# Patient Record
Sex: MALE | Race: WHITE | Employment: UNEMPLOYED | URBAN - METROPOLITAN AREA
[De-identification: names, ages, dates, MRNs, and addresses within clinical notes are randomized per-mention and may not be internally consistent; named-entity substitution may affect disease eponyms.]

---

## 2017-03-30 ENCOUNTER — HOSPITAL ENCOUNTER (OUTPATIENT)
Age: 42
Setting detail: SPECIMEN
Discharge: HOME | End: 2017-03-30
Payer: COMMERCIAL

## 2017-03-30 LAB
-: ABNORMAL
AMORPHOUS: ABNORMAL
BACTERIA: ABNORMAL
BILIRUBIN URINE: ABNORMAL
CASTS UA: ABNORMAL /LPF (ref 0–2)
COLOR: ABNORMAL
COMMENT UA: ABNORMAL
CRYSTALS, UA: ABNORMAL /HPF
EPITHELIAL CELLS UA: ABNORMAL /HPF (ref 0–5)
GLUCOSE URINE: ABNORMAL
KETONES, URINE: ABNORMAL
LEUKOCYTE ESTERASE, URINE: ABNORMAL
MUCUS: ABNORMAL
NITRITE, URINE: POSITIVE
OTHER OBSERVATIONS UA: ABNORMAL
PH UA: 6 (ref 5–8)
PROTEIN UA: ABNORMAL
RBC UA: ABNORMAL /HPF (ref 0–2)
RENAL EPITHELIAL, UA: ABNORMAL /HPF
SPECIFIC GRAVITY UA: 1.03 (ref 1–1.03)
TRICHOMONAS: ABNORMAL
TURBIDITY: ABNORMAL
URINE HGB: ABNORMAL
UROBILINOGEN, URINE: NORMAL
WBC UA: ABNORMAL /HPF (ref 0–5)
YEAST: ABNORMAL

## 2017-03-30 PROCEDURE — 87077 CULTURE AEROBIC IDENTIFY: CPT

## 2017-03-30 PROCEDURE — 87086 URINE CULTURE/COLONY COUNT: CPT

## 2017-03-30 PROCEDURE — 81001 URINALYSIS AUTO W/SCOPE: CPT

## 2017-03-30 PROCEDURE — 87186 SC STD MICRODIL/AGAR DIL: CPT

## 2017-04-01 LAB
CULTURE: ABNORMAL
CULTURE: ABNORMAL
Lab: ABNORMAL
Lab: ABNORMAL
ORGANISM: ABNORMAL
SPECIMEN DESCRIPTION: ABNORMAL
SPECIMEN DESCRIPTION: ABNORMAL
STATUS: ABNORMAL

## 2017-04-06 ENCOUNTER — HOSPITAL ENCOUNTER (OUTPATIENT)
Age: 42
Setting detail: SPECIMEN
Discharge: HOME | End: 2017-04-06
Payer: COMMERCIAL

## 2017-04-06 LAB
ANION GAP SERPL CALCULATED.3IONS-SCNC: 16 MMOL/L (ref 9–17)
BUN BLDV-MCNC: 19 MG/DL (ref 6–20)
BUN/CREAT BLD: ABNORMAL (ref 9–20)
CALCIUM SERPL-MCNC: 8.3 MG/DL (ref 8.6–10.4)
CHLORIDE BLD-SCNC: 104 MMOL/L (ref 98–107)
CO2: 18 MMOL/L (ref 20–31)
CREAT SERPL-MCNC: 0.78 MG/DL (ref 0.7–1.2)
GFR AFRICAN AMERICAN: >60 ML/MIN
GFR NON-AFRICAN AMERICAN: >60 ML/MIN
GFR SERPL CREATININE-BSD FRML MDRD: ABNORMAL ML/MIN/{1.73_M2}
GFR SERPL CREATININE-BSD FRML MDRD: ABNORMAL ML/MIN/{1.73_M2}
GLUCOSE BLD-MCNC: 71 MG/DL (ref 70–99)
HCT VFR BLD CALC: 39.9 % (ref 41–53)
HEMOGLOBIN: 13.5 G/DL (ref 13.5–17.5)
MCH RBC QN AUTO: 29.6 PG (ref 26–34)
MCHC RBC AUTO-ENTMCNC: 33.9 G/DL (ref 31–37)
MCV RBC AUTO: 87.3 FL (ref 80–100)
PDW BLD-RTO: 14.8 % (ref 12.5–15.4)
PLATELET # BLD: 227 K/UL (ref 140–450)
PMV BLD AUTO: 8.8 FL (ref 6–12)
POTASSIUM SERPL-SCNC: 4 MMOL/L (ref 3.7–5.3)
RBC # BLD: 4.57 M/UL (ref 4.5–5.9)
SODIUM BLD-SCNC: 138 MMOL/L (ref 135–144)
WBC # BLD: 8 K/UL (ref 3.5–11)

## 2017-04-06 PROCEDURE — P9603 ONE-WAY ALLOW PRORATED MILES: HCPCS

## 2017-04-06 PROCEDURE — 80048 BASIC METABOLIC PNL TOTAL CA: CPT

## 2017-04-06 PROCEDURE — 36415 COLL VENOUS BLD VENIPUNCTURE: CPT

## 2017-04-06 PROCEDURE — 85027 COMPLETE CBC AUTOMATED: CPT

## 2017-10-20 ENCOUNTER — HOSPITAL ENCOUNTER (OUTPATIENT)
Age: 42
Setting detail: SPECIMEN
Discharge: HOME | End: 2017-10-20
Payer: COMMERCIAL

## 2017-10-20 LAB
-: ABNORMAL
AMORPHOUS: ABNORMAL
BACTERIA: ABNORMAL
BILIRUBIN URINE: NEGATIVE
CASTS UA: ABNORMAL /LPF (ref 0–2)
COLOR: YELLOW
COMMENT UA: ABNORMAL
CRYSTALS, UA: ABNORMAL /HPF
EPITHELIAL CELLS UA: ABNORMAL /HPF (ref 0–5)
GLUCOSE URINE: NEGATIVE
KETONES, URINE: NEGATIVE
LEUKOCYTE ESTERASE, URINE: ABNORMAL
MUCUS: ABNORMAL
NITRITE, URINE: POSITIVE
OTHER OBSERVATIONS UA: ABNORMAL
PH UA: 7.5 (ref 5–8)
PROTEIN UA: ABNORMAL
RBC UA: ABNORMAL /HPF (ref 0–2)
RENAL EPITHELIAL, UA: ABNORMAL /HPF
SPECIFIC GRAVITY UA: 1.02 (ref 1–1.03)
TRICHOMONAS: ABNORMAL
TURBIDITY: ABNORMAL
URINE HGB: ABNORMAL
UROBILINOGEN, URINE: NORMAL
WBC UA: ABNORMAL /HPF (ref 0–5)
YEAST: ABNORMAL

## 2017-10-20 PROCEDURE — 87186 SC STD MICRODIL/AGAR DIL: CPT

## 2017-10-20 PROCEDURE — 87088 URINE BACTERIA CULTURE: CPT

## 2017-10-20 PROCEDURE — 87086 URINE CULTURE/COLONY COUNT: CPT

## 2017-10-20 PROCEDURE — 81001 URINALYSIS AUTO W/SCOPE: CPT

## 2017-10-20 PROCEDURE — 87077 CULTURE AEROBIC IDENTIFY: CPT

## 2018-02-16 ENCOUNTER — HOSPITAL ENCOUNTER (OUTPATIENT)
Age: 43
Setting detail: SPECIMEN
Discharge: HOME | End: 2018-02-16
Payer: COMMERCIAL

## 2018-02-16 LAB
ALBUMIN SERPL-MCNC: 4 G/DL (ref 3.5–5.2)
ALBUMIN/GLOBULIN RATIO: 1.3 (ref 1–2.5)
ALP BLD-CCNC: 102 U/L (ref 40–129)
ALT SERPL-CCNC: 53 U/L (ref 5–41)
ANION GAP SERPL CALCULATED.3IONS-SCNC: 18 MMOL/L (ref 9–17)
AST SERPL-CCNC: 51 U/L
BILIRUB SERPL-MCNC: 0.43 MG/DL (ref 0.3–1.2)
BUN BLDV-MCNC: 25 MG/DL (ref 6–20)
BUN/CREAT BLD: ABNORMAL (ref 9–20)
CALCIUM SERPL-MCNC: 8.6 MG/DL (ref 8.6–10.4)
CHLORIDE BLD-SCNC: 101 MMOL/L (ref 98–107)
CO2: 20 MMOL/L (ref 20–31)
CREAT SERPL-MCNC: 0.9 MG/DL (ref 0.7–1.2)
GFR AFRICAN AMERICAN: >60 ML/MIN
GFR NON-AFRICAN AMERICAN: >60 ML/MIN
GFR SERPL CREATININE-BSD FRML MDRD: ABNORMAL ML/MIN/{1.73_M2}
GFR SERPL CREATININE-BSD FRML MDRD: ABNORMAL ML/MIN/{1.73_M2}
GLUCOSE BLD-MCNC: 61 MG/DL (ref 70–99)
HCT VFR BLD CALC: 46.9 % (ref 40.7–50.3)
HEMOGLOBIN: 15 G/DL (ref 13–17)
MCH RBC QN AUTO: 29.8 PG (ref 25.2–33.5)
MCHC RBC AUTO-ENTMCNC: 32 G/DL (ref 28.4–34.8)
MCV RBC AUTO: 93.2 FL (ref 82.6–102.9)
NRBC AUTOMATED: 0 PER 100 WBC
PDW BLD-RTO: 13 % (ref 11.8–14.4)
PLATELET # BLD: 284 K/UL (ref 138–453)
PMV BLD AUTO: 10.6 FL (ref 8.1–13.5)
POTASSIUM SERPL-SCNC: 3.8 MMOL/L (ref 3.7–5.3)
RBC # BLD: 5.03 M/UL (ref 4.21–5.77)
SODIUM BLD-SCNC: 139 MMOL/L (ref 135–144)
TOTAL PROTEIN: 7 G/DL (ref 6.4–8.3)
WBC # BLD: 8.3 K/UL (ref 3.5–11.3)

## 2018-02-16 PROCEDURE — 36415 COLL VENOUS BLD VENIPUNCTURE: CPT

## 2018-02-16 PROCEDURE — P9603 ONE-WAY ALLOW PRORATED MILES: HCPCS

## 2018-02-16 PROCEDURE — 80053 COMPREHEN METABOLIC PANEL: CPT

## 2018-02-16 PROCEDURE — 85027 COMPLETE CBC AUTOMATED: CPT

## 2018-05-15 ENCOUNTER — HOSPITAL ENCOUNTER (OUTPATIENT)
Age: 43
Setting detail: SPECIMEN
Discharge: HOME | End: 2018-05-15
Payer: COMMERCIAL

## 2018-05-15 LAB — VITAMIN D 25-HYDROXY: 63.9 NG/ML (ref 30–100)

## 2018-05-15 PROCEDURE — P9603 ONE-WAY ALLOW PRORATED MILES: HCPCS

## 2018-05-15 PROCEDURE — 82306 VITAMIN D 25 HYDROXY: CPT

## 2018-05-15 PROCEDURE — 36415 COLL VENOUS BLD VENIPUNCTURE: CPT

## 2018-11-14 ENCOUNTER — HOSPITAL ENCOUNTER (OUTPATIENT)
Age: 43
Setting detail: SPECIMEN
Discharge: HOME | End: 2018-11-14
Payer: COMMERCIAL

## 2018-11-14 LAB — VITAMIN D 25-HYDROXY: 75.3 NG/ML (ref 30–100)

## 2018-11-14 PROCEDURE — 82306 VITAMIN D 25 HYDROXY: CPT

## 2018-11-14 PROCEDURE — 36415 COLL VENOUS BLD VENIPUNCTURE: CPT

## 2018-11-14 PROCEDURE — P9603 ONE-WAY ALLOW PRORATED MILES: HCPCS

## 2018-12-06 ENCOUNTER — HOSPITAL ENCOUNTER (OUTPATIENT)
Age: 43
Setting detail: SPECIMEN
Discharge: HOME | End: 2018-12-06
Payer: COMMERCIAL

## 2018-12-06 LAB
ANION GAP SERPL CALCULATED.3IONS-SCNC: 16 MMOL/L (ref 9–17)
BUN BLDV-MCNC: 16 MG/DL (ref 6–20)
BUN/CREAT BLD: 25 (ref 9–20)
CALCIUM SERPL-MCNC: 9.2 MG/DL (ref 8.6–10.4)
CHLORIDE BLD-SCNC: 100 MMOL/L (ref 98–107)
CO2: 22 MMOL/L (ref 20–31)
CREAT SERPL-MCNC: 0.63 MG/DL (ref 0.7–1.2)
GFR AFRICAN AMERICAN: >60 ML/MIN
GFR NON-AFRICAN AMERICAN: >60 ML/MIN
GFR SERPL CREATININE-BSD FRML MDRD: ABNORMAL ML/MIN/{1.73_M2}
GFR SERPL CREATININE-BSD FRML MDRD: ABNORMAL ML/MIN/{1.73_M2}
GLUCOSE BLD-MCNC: 95 MG/DL (ref 70–99)
POTASSIUM SERPL-SCNC: 3.8 MMOL/L (ref 3.7–5.3)
SODIUM BLD-SCNC: 138 MMOL/L (ref 135–144)

## 2018-12-06 PROCEDURE — P9603 ONE-WAY ALLOW PRORATED MILES: HCPCS

## 2018-12-06 PROCEDURE — 36415 COLL VENOUS BLD VENIPUNCTURE: CPT

## 2018-12-06 PROCEDURE — 80048 BASIC METABOLIC PNL TOTAL CA: CPT

## 2018-12-10 ENCOUNTER — HOSPITAL ENCOUNTER (OUTPATIENT)
Age: 43
Setting detail: SPECIMEN
Discharge: HOME | End: 2018-12-10
Payer: COMMERCIAL

## 2018-12-10 LAB
VANCOMYCIN TROUGH DATE LAST DOSE: ABNORMAL
VANCOMYCIN TROUGH DOSE AMOUNT: ABNORMAL
VANCOMYCIN TROUGH TIME LAST DOSE: ABNORMAL
VANCOMYCIN TROUGH: 23.2 UG/ML (ref 10–20)

## 2018-12-10 PROCEDURE — 80202 ASSAY OF VANCOMYCIN: CPT

## 2018-12-13 ENCOUNTER — HOSPITAL ENCOUNTER (OUTPATIENT)
Age: 43
Setting detail: SPECIMEN
Discharge: HOME | End: 2018-12-13
Payer: COMMERCIAL

## 2018-12-13 LAB
VANCOMYCIN RANDOM DATE LAST DOSE: NORMAL
VANCOMYCIN RANDOM DOSE AMOUNT: NORMAL
VANCOMYCIN RANDOM TIME LAST DOSE: NORMAL
VANCOMYCIN RANDOM: <4 UG/ML

## 2018-12-13 PROCEDURE — 80202 ASSAY OF VANCOMYCIN: CPT

## 2019-01-18 ENCOUNTER — HOSPITAL ENCOUNTER (OUTPATIENT)
Age: 44
Setting detail: SPECIMEN
Discharge: HOME | End: 2019-01-18
Payer: COMMERCIAL

## 2019-03-04 ENCOUNTER — HOSPITAL ENCOUNTER (OUTPATIENT)
Age: 44
Setting detail: SPECIMEN
Discharge: HOME | End: 2019-03-04
Payer: COMMERCIAL

## 2019-03-04 PROCEDURE — 87086 URINE CULTURE/COLONY COUNT: CPT

## 2019-03-04 PROCEDURE — 87088 URINE BACTERIA CULTURE: CPT

## 2019-03-04 PROCEDURE — 87186 SC STD MICRODIL/AGAR DIL: CPT

## 2019-03-04 PROCEDURE — 81001 URINALYSIS AUTO W/SCOPE: CPT

## 2019-03-05 LAB
-: ABNORMAL
AMORPHOUS: ABNORMAL
BACTERIA: ABNORMAL
BILIRUBIN URINE: NEGATIVE
CASTS UA: ABNORMAL /LPF (ref 0–8)
COLOR: YELLOW
COMMENT UA: ABNORMAL
CRYSTALS, UA: ABNORMAL /HPF
EPITHELIAL CELLS UA: ABNORMAL /HPF (ref 0–5)
GLUCOSE URINE: NEGATIVE
KETONES, URINE: NEGATIVE
LEUKOCYTE ESTERASE, URINE: ABNORMAL
MUCUS: ABNORMAL
NITRITE, URINE: POSITIVE
OTHER OBSERVATIONS UA: ABNORMAL
PH UA: 6.5 (ref 5–8)
PROTEIN UA: ABNORMAL
RBC UA: ABNORMAL /HPF (ref 0–4)
RENAL EPITHELIAL, UA: ABNORMAL /HPF
SPECIFIC GRAVITY UA: 1.02 (ref 1–1.03)
TRICHOMONAS: ABNORMAL
TURBIDITY: ABNORMAL
URINE HGB: ABNORMAL
UROBILINOGEN, URINE: NORMAL
WBC UA: ABNORMAL /HPF (ref 0–5)
YEAST: ABNORMAL

## 2019-03-06 LAB
CULTURE: ABNORMAL
Lab: ABNORMAL
SPECIMEN DESCRIPTION: ABNORMAL

## 2019-03-22 ENCOUNTER — HOSPITAL ENCOUNTER (OUTPATIENT)
Age: 44
Setting detail: SPECIMEN
Discharge: HOME | End: 2019-03-22
Payer: COMMERCIAL

## 2019-03-22 LAB
-: NORMAL
AMORPHOUS: NORMAL
BACTERIA: NORMAL
BILIRUBIN URINE: NEGATIVE
CASTS UA: NORMAL /LPF (ref 0–2)
CASTS UA: NORMAL /LPF (ref 0–2)
COLOR: YELLOW
COMMENT UA: ABNORMAL
CRYSTALS, UA: NORMAL /HPF
EPITHELIAL CELLS UA: NORMAL /HPF (ref 0–5)
GLUCOSE URINE: NEGATIVE
KETONES, URINE: ABNORMAL
LEUKOCYTE ESTERASE, URINE: ABNORMAL
MUCUS: NORMAL
NITRITE, URINE: NEGATIVE
OTHER OBSERVATIONS UA: NORMAL
PH UA: 5.5 (ref 5–8)
PROTEIN UA: ABNORMAL
RBC UA: NORMAL /HPF (ref 0–2)
RENAL EPITHELIAL, UA: NORMAL /HPF
SPECIFIC GRAVITY UA: 1.02 (ref 1–1.03)
TRICHOMONAS: NORMAL
TURBIDITY: CLEAR
URINE HGB: ABNORMAL
UROBILINOGEN, URINE: NORMAL
WBC UA: NORMAL /HPF (ref 0–5)
YEAST: NORMAL

## 2019-03-22 PROCEDURE — 87086 URINE CULTURE/COLONY COUNT: CPT

## 2019-03-22 PROCEDURE — 81001 URINALYSIS AUTO W/SCOPE: CPT

## 2019-03-23 LAB
CULTURE: NO GROWTH
Lab: NORMAL
SPECIMEN DESCRIPTION: NORMAL

## 2019-04-12 ENCOUNTER — HOSPITAL ENCOUNTER (OUTPATIENT)
Age: 44
Setting detail: SPECIMEN
Discharge: HOME | End: 2019-04-12
Payer: COMMERCIAL

## 2019-04-12 LAB — VITAMIN D 25-HYDROXY: 72.9 NG/ML (ref 30–100)

## 2019-04-12 PROCEDURE — 82306 VITAMIN D 25 HYDROXY: CPT

## 2019-04-12 PROCEDURE — P9603 ONE-WAY ALLOW PRORATED MILES: HCPCS

## 2019-04-12 PROCEDURE — 36415 COLL VENOUS BLD VENIPUNCTURE: CPT

## 2019-04-19 ENCOUNTER — HOSPITAL ENCOUNTER (OUTPATIENT)
Age: 44
Setting detail: SPECIMEN
Discharge: HOME | End: 2019-04-19
Payer: COMMERCIAL

## 2019-04-19 LAB
-: ABNORMAL
AMORPHOUS: ABNORMAL
BACTERIA: ABNORMAL
BILIRUBIN URINE: NEGATIVE
CASTS UA: ABNORMAL /LPF
COLOR: ABNORMAL
COMMENT UA: ABNORMAL
CRYSTALS, UA: ABNORMAL /HPF
EPITHELIAL CELLS UA: ABNORMAL /HPF (ref 0–5)
GLUCOSE URINE: ABNORMAL
KETONES, URINE: NEGATIVE
LEUKOCYTE ESTERASE, URINE: ABNORMAL
MUCUS: ABNORMAL
NITRITE, URINE: POSITIVE
OTHER OBSERVATIONS UA: ABNORMAL
PH UA: 5.5 (ref 5–8)
PROTEIN UA: ABNORMAL
RBC UA: ABNORMAL /HPF (ref 0–2)
RENAL EPITHELIAL, UA: ABNORMAL /HPF
SPECIFIC GRAVITY UA: 1.02 (ref 1–1.03)
TRICHOMONAS: ABNORMAL
TURBIDITY: ABNORMAL
URINE HGB: ABNORMAL
UROBILINOGEN, URINE: ABNORMAL
WBC UA: ABNORMAL /HPF (ref 0–5)
YEAST: ABNORMAL

## 2019-04-19 PROCEDURE — 87086 URINE CULTURE/COLONY COUNT: CPT

## 2019-04-19 PROCEDURE — 81001 URINALYSIS AUTO W/SCOPE: CPT

## 2019-04-20 LAB
CULTURE: NORMAL
Lab: NORMAL
SPECIMEN DESCRIPTION: NORMAL

## 2019-08-08 ENCOUNTER — APPOINTMENT (OUTPATIENT)
Dept: GENERAL RADIOLOGY | Age: 44
DRG: 854 | End: 2019-08-08
Payer: MEDICARE

## 2019-08-08 ENCOUNTER — APPOINTMENT (OUTPATIENT)
Dept: CT IMAGING | Age: 44
DRG: 854 | End: 2019-08-08
Payer: MEDICARE

## 2019-08-08 ENCOUNTER — HOSPITAL ENCOUNTER (INPATIENT)
Age: 44
LOS: 13 days | Discharge: NURSING, REHAB OR LONG-TERM CARE | DRG: 854 | End: 2019-08-21
Attending: EMERGENCY MEDICINE | Admitting: INTERNAL MEDICINE
Payer: MEDICARE

## 2019-08-08 DIAGNOSIS — N39.0 URINARY TRACT INFECTION WITHOUT HEMATURIA, SITE UNSPECIFIED: ICD-10-CM

## 2019-08-08 DIAGNOSIS — A41.9 SEPTICEMIA (HCC): Primary | ICD-10-CM

## 2019-08-08 DIAGNOSIS — Z93.3 S/P COLOSTOMY (HCC): ICD-10-CM

## 2019-08-08 DIAGNOSIS — K56.41 FECAL IMPACTION (HCC): ICD-10-CM

## 2019-08-08 PROBLEM — Z87.19 H/O SMALL BOWEL OBSTRUCTION: Status: ACTIVE | Noted: 2019-08-08

## 2019-08-08 PROBLEM — K92.2 GI BLEED: Status: ACTIVE | Noted: 2019-08-08

## 2019-08-08 PROBLEM — Z72.0 TOBACCO ABUSE: Status: ACTIVE | Noted: 2019-08-08

## 2019-08-08 PROBLEM — K92.2 GI BLEED: Status: RESOLVED | Noted: 2019-08-08 | Resolved: 2019-08-08

## 2019-08-08 PROBLEM — R73.9 HYPERGLYCEMIA: Status: ACTIVE | Noted: 2019-08-08

## 2019-08-08 LAB
-: NORMAL
-: NORMAL
ABSOLUTE EOS #: 0 K/UL (ref 0–0.4)
ABSOLUTE IMMATURE GRANULOCYTE: 0.22 K/UL (ref 0–0.3)
ABSOLUTE LYMPH #: 0.67 K/UL (ref 1–4.8)
ABSOLUTE MONO #: 2.01 K/UL (ref 0.2–0.8)
ALBUMIN SERPL-MCNC: 4.6 G/DL (ref 3.5–5.2)
ALBUMIN/GLOBULIN RATIO: ABNORMAL (ref 1–2.5)
ALP BLD-CCNC: 130 U/L (ref 40–129)
ALT SERPL-CCNC: 47 U/L (ref 5–41)
AMMONIA: 76 UMOL/L (ref 16–60)
AMORPHOUS: NORMAL
AMPHETAMINE SCREEN URINE: NEGATIVE
AMYLASE: 52 U/L (ref 28–100)
ANION GAP SERPL CALCULATED.3IONS-SCNC: 23 MMOL/L (ref 9–17)
AST SERPL-CCNC: 59 U/L
BACTERIA: NORMAL
BARBITURATE SCREEN URINE: NEGATIVE
BASOPHILS # BLD: 0 %
BASOPHILS ABSOLUTE: 0 K/UL (ref 0–0.2)
BENZODIAZEPINE SCREEN, URINE: NEGATIVE
BILIRUB SERPL-MCNC: 0.94 MG/DL (ref 0.3–1.2)
BILIRUBIN URINE: ABNORMAL
BUN BLDV-MCNC: 21 MG/DL (ref 6–20)
BUN/CREAT BLD: 19 (ref 9–20)
BUPRENORPHINE URINE: ABNORMAL
CALCIUM SERPL-MCNC: 9.9 MG/DL (ref 8.6–10.4)
CANNABINOID SCREEN URINE: NEGATIVE
CASTS UA: NORMAL /LPF
CHLORIDE BLD-SCNC: 101 MMOL/L (ref 98–107)
CO2: 13 MMOL/L (ref 20–31)
COCAINE METABOLITE, URINE: NEGATIVE
COLOR: ABNORMAL
COMMENT UA: ABNORMAL
CREAT SERPL-MCNC: 1.09 MG/DL (ref 0.7–1.2)
CRYSTALS, UA: NORMAL /HPF
DATE, STOOL #1: NORMAL
DATE, STOOL #2: NORMAL
DATE, STOOL #3: NORMAL
DIFFERENTIAL TYPE: ABNORMAL
EOSINOPHILS RELATIVE PERCENT: 0 % (ref 1–4)
EPITHELIAL CELLS UA: NORMAL /HPF (ref 0–5)
FIO2: 21
GFR AFRICAN AMERICAN: >60 ML/MIN
GFR NON-AFRICAN AMERICAN: >60 ML/MIN
GFR SERPL CREATININE-BSD FRML MDRD: ABNORMAL ML/MIN/{1.73_M2}
GFR SERPL CREATININE-BSD FRML MDRD: ABNORMAL ML/MIN/{1.73_M2}
GLUCOSE BLD-MCNC: 195 MG/DL (ref 70–99)
GLUCOSE URINE: ABNORMAL
HCT VFR BLD CALC: 57.4 % (ref 40.7–50.3)
HEMOCCULT SP1 STL QL: NEGATIVE
HEMOCCULT SP2 STL QL: NORMAL
HEMOCCULT SP3 STL QL: NORMAL
HEMOGLOBIN: 18.9 G/DL (ref 13–17)
IMMATURE GRANULOCYTES: 1 %
INR BLD: 1
KETONES, URINE: ABNORMAL
LACTIC ACID, SEPSIS WHOLE BLOOD: ABNORMAL MMOL/L (ref 0.5–1.9)
LACTIC ACID, SEPSIS WHOLE BLOOD: ABNORMAL MMOL/L (ref 0.5–1.9)
LACTIC ACID, SEPSIS: 2.9 MMOL/L (ref 0.5–1.9)
LACTIC ACID, SEPSIS: 3.9 MMOL/L (ref 0.5–1.9)
LEUKOCYTE ESTERASE, URINE: ABNORMAL
LIPASE: 31 U/L (ref 13–60)
LYMPHOCYTES # BLD: 3 % (ref 24–44)
MCH RBC QN AUTO: 30.1 PG (ref 25.2–33.5)
MCHC RBC AUTO-ENTMCNC: 32.9 G/DL (ref 28.4–34.8)
MCV RBC AUTO: 91.4 FL (ref 82.6–102.9)
MDMA URINE: ABNORMAL
METHADONE SCREEN, URINE: NEGATIVE
METHAMPHETAMINE, URINE: ABNORMAL
MONOCYTES # BLD: 9 % (ref 1–7)
MUCUS: NORMAL
NEGATIVE BASE EXCESS, ART: 7 (ref 0–2)
NITRITE, URINE: POSITIVE
NRBC AUTOMATED: 0 PER 100 WBC
O2 DEVICE/FLOW/%: ABNORMAL
OPIATES, URINE: POSITIVE
OTHER OBSERVATIONS UA: NORMAL
OXYCODONE SCREEN URINE: NEGATIVE
PARTIAL THROMBOPLASTIN TIME: 28 SEC (ref 23–31)
PATIENT TEMP: 37
PDW BLD-RTO: 13.5 % (ref 11.8–14.4)
PH UA: 6.5 (ref 5–8)
PHENCYCLIDINE, URINE: NEGATIVE
PLATELET # BLD: 365 K/UL (ref 138–453)
PLATELET ESTIMATE: ABNORMAL
PMV BLD AUTO: 10.6 FL (ref 8.1–13.5)
POC HCO3: 18.8 MMOL/L (ref 22–27)
POC O2 SATURATION: 94 %
POC PCO2 TEMP: ABNORMAL MM HG
POC PCO2: 34 MM HG (ref 32–45)
POC PH TEMP: ABNORMAL
POC PH: 7.36 (ref 7.35–7.45)
POC PO2 TEMP: ABNORMAL MM HG
POC PO2: 74 MM HG (ref 75–95)
POSITIVE BASE EXCESS, ART: ABNORMAL (ref 0–2)
POTASSIUM SERPL-SCNC: 4.9 MMOL/L (ref 3.7–5.3)
PROPOXYPHENE, URINE: ABNORMAL
PROTEIN UA: ABNORMAL
PROTHROMBIN TIME: 10.7 SEC (ref 9.7–11.6)
RBC # BLD: 6.28 M/UL (ref 4.21–5.77)
RBC # BLD: ABNORMAL 10*6/UL
RBC UA: NORMAL /HPF (ref 0–2)
REASON FOR REJECTION: NORMAL
RENAL EPITHELIAL, UA: NORMAL /HPF
SEG NEUTROPHILS: 87 % (ref 36–66)
SEGMENTED NEUTROPHILS ABSOLUTE COUNT: 19.4 K/UL (ref 1.8–7.7)
SODIUM BLD-SCNC: 137 MMOL/L (ref 135–144)
SPECIFIC GRAVITY UA: 1.01 (ref 1–1.03)
TCO2 (CALC), ART: 20 MMOL/L (ref 23–28)
TEST INFORMATION: ABNORMAL
THYROXINE, FREE: 1.72 NG/DL (ref 0.93–1.7)
TIME, STOOL #1: NORMAL
TIME, STOOL #2: NORMAL
TIME, STOOL #3: NORMAL
TOTAL PROTEIN: 7.8 G/DL (ref 6.4–8.3)
TRICHOMONAS: NORMAL
TRICYCLIC ANTIDEPRESSANTS, UR: ABNORMAL
TROPONIN INTERP: NORMAL
TROPONIN T: NORMAL NG/ML
TROPONIN, HIGH SENSITIVITY: <6 NG/L (ref 0–22)
TSH SERPL DL<=0.05 MIU/L-ACNC: 0.65 MIU/L (ref 0.3–5)
TURBIDITY: ABNORMAL
URINE HGB: ABNORMAL
UROBILINOGEN, URINE: ABNORMAL
WBC # BLD: 22.3 K/UL (ref 3.5–11.3)
WBC # BLD: ABNORMAL 10*3/UL
WBC UA: NORMAL /HPF (ref 0–5)
YEAST: NORMAL
ZZ NTE CLEAN UP: ORDERED TEST: NORMAL
ZZ NTE WITH NAME CLEAN UP: SPECIMEN SOURCE: NORMAL

## 2019-08-08 PROCEDURE — 2000000000 HC ICU R&B

## 2019-08-08 PROCEDURE — 70450 CT HEAD/BRAIN W/O DYE: CPT

## 2019-08-08 PROCEDURE — 87040 BLOOD CULTURE FOR BACTERIA: CPT

## 2019-08-08 PROCEDURE — 2500000003 HC RX 250 WO HCPCS: Performed by: NURSE PRACTITIONER

## 2019-08-08 PROCEDURE — 51702 INSERT TEMP BLADDER CATH: CPT

## 2019-08-08 PROCEDURE — 88307 TISSUE EXAM BY PATHOLOGIST: CPT

## 2019-08-08 PROCEDURE — 83690 ASSAY OF LIPASE: CPT

## 2019-08-08 PROCEDURE — 96361 HYDRATE IV INFUSION ADD-ON: CPT

## 2019-08-08 PROCEDURE — 6360000004 HC RX CONTRAST MEDICATION: Performed by: EMERGENCY MEDICINE

## 2019-08-08 PROCEDURE — 02HV33Z INSERTION OF INFUSION DEVICE INTO SUPERIOR VENA CAVA, PERCUTANEOUS APPROACH: ICD-10-PCS | Performed by: INTERNAL MEDICINE

## 2019-08-08 PROCEDURE — 2580000003 HC RX 258: Performed by: NURSE PRACTITIONER

## 2019-08-08 PROCEDURE — 88302 TISSUE EXAM BY PATHOLOGIST: CPT

## 2019-08-08 PROCEDURE — 74177 CT ABD & PELVIS W/CONTRAST: CPT

## 2019-08-08 PROCEDURE — 84484 ASSAY OF TROPONIN QUANT: CPT

## 2019-08-08 PROCEDURE — 85610 PROTHROMBIN TIME: CPT

## 2019-08-08 PROCEDURE — 2580000003 HC RX 258: Performed by: INTERNAL MEDICINE

## 2019-08-08 PROCEDURE — 84439 ASSAY OF FREE THYROXINE: CPT

## 2019-08-08 PROCEDURE — 36600 WITHDRAWAL OF ARTERIAL BLOOD: CPT

## 2019-08-08 PROCEDURE — 80307 DRUG TEST PRSMV CHEM ANLYZR: CPT

## 2019-08-08 PROCEDURE — 96375 TX/PRO/DX INJ NEW DRUG ADDON: CPT

## 2019-08-08 PROCEDURE — 87324 CLOSTRIDIUM AG IA: CPT

## 2019-08-08 PROCEDURE — 0T9B70Z DRAINAGE OF BLADDER WITH DRAINAGE DEVICE, VIA NATURAL OR ARTIFICIAL OPENING: ICD-10-PCS | Performed by: EMERGENCY MEDICINE

## 2019-08-08 PROCEDURE — 83605 ASSAY OF LACTIC ACID: CPT

## 2019-08-08 PROCEDURE — 2580000003 HC RX 258: Performed by: EMERGENCY MEDICINE

## 2019-08-08 PROCEDURE — 6360000002 HC RX W HCPCS: Performed by: EMERGENCY MEDICINE

## 2019-08-08 PROCEDURE — 99285 EMERGENCY DEPT VISIT HI MDM: CPT

## 2019-08-08 PROCEDURE — 87506 IADNA-DNA/RNA PROBE TQ 6-11: CPT

## 2019-08-08 PROCEDURE — 84443 ASSAY THYROID STIM HORMONE: CPT

## 2019-08-08 PROCEDURE — 85730 THROMBOPLASTIN TIME PARTIAL: CPT

## 2019-08-08 PROCEDURE — 82140 ASSAY OF AMMONIA: CPT

## 2019-08-08 PROCEDURE — 96365 THER/PROPH/DIAG IV INF INIT: CPT

## 2019-08-08 PROCEDURE — 74018 RADEX ABDOMEN 1 VIEW: CPT

## 2019-08-08 PROCEDURE — 87449 NOS EACH ORGANISM AG IA: CPT

## 2019-08-08 PROCEDURE — 93005 ELECTROCARDIOGRAM TRACING: CPT | Performed by: EMERGENCY MEDICINE

## 2019-08-08 PROCEDURE — 71250 CT THORAX DX C-: CPT

## 2019-08-08 PROCEDURE — 87086 URINE CULTURE/COLONY COUNT: CPT

## 2019-08-08 PROCEDURE — 81001 URINALYSIS AUTO W/SCOPE: CPT

## 2019-08-08 PROCEDURE — 82803 BLOOD GASES ANY COMBINATION: CPT

## 2019-08-08 PROCEDURE — 82150 ASSAY OF AMYLASE: CPT

## 2019-08-08 PROCEDURE — 85025 COMPLETE CBC W/AUTO DIFF WBC: CPT

## 2019-08-08 PROCEDURE — 80053 COMPREHEN METABOLIC PANEL: CPT

## 2019-08-08 PROCEDURE — G0328 FECAL BLOOD SCRN IMMUNOASSAY: HCPCS

## 2019-08-08 PROCEDURE — 99223 1ST HOSP IP/OBS HIGH 75: CPT | Performed by: NURSE PRACTITIONER

## 2019-08-08 PROCEDURE — 71045 X-RAY EXAM CHEST 1 VIEW: CPT

## 2019-08-08 RX ORDER — ERGOCALCIFEROL 1.25 MG/1
50000 CAPSULE ORAL WEEKLY
Status: DISCONTINUED | OUTPATIENT
Start: 2019-08-08 | End: 2019-08-09

## 2019-08-08 RX ORDER — SODIUM CHLORIDE 0.9 % (FLUSH) 0.9 %
10 SYRINGE (ML) INJECTION PRN
Status: DISCONTINUED | OUTPATIENT
Start: 2019-08-08 | End: 2019-08-08 | Stop reason: SDUPTHER

## 2019-08-08 RX ORDER — HYDROCODONE BITARTRATE AND ACETAMINOPHEN 5; 325 MG/1; MG/1
1 TABLET ORAL EVERY 6 HOURS PRN
Status: ON HOLD | COMMUNITY
End: 2019-08-21 | Stop reason: SDUPTHER

## 2019-08-08 RX ORDER — NICOTINE POLACRILEX 4 MG
15 LOZENGE BUCCAL PRN
Status: DISCONTINUED | OUTPATIENT
Start: 2019-08-08 | End: 2019-08-21 | Stop reason: HOSPADM

## 2019-08-08 RX ORDER — HYDROCODONE BITARTRATE AND ACETAMINOPHEN 5; 325 MG/1; MG/1
1 TABLET ORAL EVERY 6 HOURS PRN
Status: DISCONTINUED | OUTPATIENT
Start: 2019-08-08 | End: 2019-08-08

## 2019-08-08 RX ORDER — ONDANSETRON 2 MG/ML
4 INJECTION INTRAMUSCULAR; INTRAVENOUS EVERY 6 HOURS PRN
Status: DISCONTINUED | OUTPATIENT
Start: 2019-08-08 | End: 2019-08-09

## 2019-08-08 RX ORDER — 0.9 % SODIUM CHLORIDE 0.9 %
30 INTRAVENOUS SOLUTION INTRAVENOUS ONCE
Status: COMPLETED | OUTPATIENT
Start: 2019-08-08 | End: 2019-08-09

## 2019-08-08 RX ORDER — FENTANYL 25 UG/H
1 PATCH TRANSDERMAL
Status: DISCONTINUED | OUTPATIENT
Start: 2019-08-08 | End: 2019-08-09

## 2019-08-08 RX ORDER — BENZTROPINE MESYLATE 1 MG/ML
2 INJECTION INTRAMUSCULAR; INTRAVENOUS DAILY PRN
Status: DISCONTINUED | OUTPATIENT
Start: 2019-08-08 | End: 2019-08-21 | Stop reason: HOSPADM

## 2019-08-08 RX ORDER — LUBIPROSTONE 24 UG/1
24 CAPSULE, GELATIN COATED ORAL 2 TIMES DAILY PRN
Status: ON HOLD | COMMUNITY
End: 2019-08-21 | Stop reason: HOSPADM

## 2019-08-08 RX ORDER — DIPHENHYDRAMINE HCL 25 MG
25 CAPSULE ORAL EVERY 6 HOURS PRN
Status: DISCONTINUED | OUTPATIENT
Start: 2019-08-08 | End: 2019-08-08

## 2019-08-08 RX ORDER — OXYBUTYNIN CHLORIDE 10 MG/1
10 TABLET, EXTENDED RELEASE ORAL DAILY
Status: DISCONTINUED | OUTPATIENT
Start: 2019-08-08 | End: 2019-08-09

## 2019-08-08 RX ORDER — NICOTINE 21 MG/24HR
1 PATCH, TRANSDERMAL 24 HOURS TRANSDERMAL DAILY PRN
Status: DISCONTINUED | OUTPATIENT
Start: 2019-08-08 | End: 2019-08-21 | Stop reason: HOSPADM

## 2019-08-08 RX ORDER — LUBIPROSTONE 24 UG/1
24 CAPSULE, GELATIN COATED ORAL 2 TIMES DAILY PRN
Status: DISCONTINUED | OUTPATIENT
Start: 2019-08-08 | End: 2019-08-08

## 2019-08-08 RX ORDER — TRAZODONE HYDROCHLORIDE 50 MG/1
25 TABLET ORAL NIGHTLY PRN
COMMUNITY

## 2019-08-08 RX ORDER — ONDANSETRON 2 MG/ML
4 INJECTION INTRAMUSCULAR; INTRAVENOUS ONCE
Status: COMPLETED | OUTPATIENT
Start: 2019-08-08 | End: 2019-08-08

## 2019-08-08 RX ORDER — 0.9 % SODIUM CHLORIDE 0.9 %
250 INTRAVENOUS SOLUTION INTRAVENOUS ONCE
Status: COMPLETED | OUTPATIENT
Start: 2019-08-08 | End: 2019-08-08

## 2019-08-08 RX ORDER — ACETAMINOPHEN 325 MG/1
650 TABLET ORAL EVERY 4 HOURS PRN
Status: DISCONTINUED | OUTPATIENT
Start: 2019-08-08 | End: 2019-08-21 | Stop reason: HOSPADM

## 2019-08-08 RX ORDER — VILAZODONE HYDROCHLORIDE 20 MG/1
20 TABLET ORAL DAILY
COMMUNITY

## 2019-08-08 RX ORDER — 0.9 % SODIUM CHLORIDE 0.9 %
10 VIAL (ML) INJECTION 2 TIMES DAILY
Status: DISCONTINUED | OUTPATIENT
Start: 2019-08-08 | End: 2019-08-09

## 2019-08-08 RX ORDER — PANTOPRAZOLE SODIUM 40 MG/1
40 TABLET, DELAYED RELEASE ORAL
Status: DISCONTINUED | OUTPATIENT
Start: 2019-08-09 | End: 2019-08-08

## 2019-08-08 RX ORDER — SODIUM CHLORIDE 0.9 % (FLUSH) 0.9 %
10 SYRINGE (ML) INJECTION EVERY 12 HOURS SCHEDULED
Status: DISCONTINUED | OUTPATIENT
Start: 2019-08-08 | End: 2019-08-09 | Stop reason: SDUPTHER

## 2019-08-08 RX ORDER — SODIUM CHLORIDE 9 MG/ML
INJECTION, SOLUTION INTRAVENOUS CONTINUOUS
Status: DISCONTINUED | OUTPATIENT
Start: 2019-08-08 | End: 2019-08-08

## 2019-08-08 RX ORDER — DEXTROSE MONOHYDRATE 50 MG/ML
100 INJECTION, SOLUTION INTRAVENOUS PRN
Status: DISCONTINUED | OUTPATIENT
Start: 2019-08-08 | End: 2019-08-21 | Stop reason: HOSPADM

## 2019-08-08 RX ORDER — PROMETHAZINE HYDROCHLORIDE 25 MG/1
25 TABLET ORAL EVERY 6 HOURS PRN
Status: DISCONTINUED | OUTPATIENT
Start: 2019-08-08 | End: 2019-08-08

## 2019-08-08 RX ORDER — FAMOTIDINE 20 MG/1
20 TABLET, FILM COATED ORAL 2 TIMES DAILY
Status: DISCONTINUED | OUTPATIENT
Start: 2019-08-08 | End: 2019-08-09

## 2019-08-08 RX ORDER — 0.9 % SODIUM CHLORIDE 0.9 %
2000 INTRAVENOUS SOLUTION INTRAVENOUS ONCE
Status: COMPLETED | OUTPATIENT
Start: 2019-08-08 | End: 2019-08-08

## 2019-08-08 RX ORDER — LAMOTRIGINE 25 MG/1
25 TABLET ORAL 2 TIMES DAILY
COMMUNITY

## 2019-08-08 RX ORDER — GABAPENTIN 100 MG/1
100 CAPSULE ORAL 2 TIMES DAILY
COMMUNITY

## 2019-08-08 RX ORDER — SODIUM CHLORIDE 0.9 % (FLUSH) 0.9 %
10 SYRINGE (ML) INJECTION EVERY 12 HOURS SCHEDULED
Status: DISCONTINUED | OUTPATIENT
Start: 2019-08-08 | End: 2019-08-08 | Stop reason: SDUPTHER

## 2019-08-08 RX ORDER — SODIUM CHLORIDE 0.9 % (FLUSH) 0.9 %
10 SYRINGE (ML) INJECTION PRN
Status: DISCONTINUED | OUTPATIENT
Start: 2019-08-08 | End: 2019-08-09 | Stop reason: SDUPTHER

## 2019-08-08 RX ORDER — DIPHENHYDRAMINE HCL 25 MG
25 CAPSULE ORAL EVERY 6 HOURS PRN
COMMUNITY

## 2019-08-08 RX ORDER — MORPHINE SULFATE 2 MG/ML
2 INJECTION, SOLUTION INTRAMUSCULAR; INTRAVENOUS
Status: DISCONTINUED | OUTPATIENT
Start: 2019-08-08 | End: 2019-08-21 | Stop reason: HOSPADM

## 2019-08-08 RX ORDER — ONDANSETRON 2 MG/ML
4 INJECTION INTRAMUSCULAR; INTRAVENOUS EVERY 6 HOURS PRN
Status: DISCONTINUED | OUTPATIENT
Start: 2019-08-08 | End: 2019-08-08 | Stop reason: ALTCHOICE

## 2019-08-08 RX ORDER — GABAPENTIN 100 MG/1
100 CAPSULE ORAL 2 TIMES DAILY
Status: DISCONTINUED | OUTPATIENT
Start: 2019-08-08 | End: 2019-08-09

## 2019-08-08 RX ORDER — 0.9 % SODIUM CHLORIDE 0.9 %
80 INTRAVENOUS SOLUTION INTRAVENOUS ONCE
Status: COMPLETED | OUTPATIENT
Start: 2019-08-08 | End: 2019-08-08

## 2019-08-08 RX ORDER — POLYETHYLENE GLYCOL 3350 17 G/17G
17 POWDER, FOR SOLUTION ORAL DAILY PRN
Status: DISCONTINUED | OUTPATIENT
Start: 2019-08-08 | End: 2019-08-11

## 2019-08-08 RX ORDER — FAMOTIDINE 20 MG/1
20 TABLET, FILM COATED ORAL 2 TIMES DAILY
COMMUNITY

## 2019-08-08 RX ORDER — NYSTATIN 100000 U/G
CREAM TOPICAL 2 TIMES DAILY
Status: DISCONTINUED | OUTPATIENT
Start: 2019-08-08 | End: 2019-08-21 | Stop reason: HOSPADM

## 2019-08-08 RX ORDER — TAMSULOSIN HYDROCHLORIDE 0.4 MG/1
0.4 CAPSULE ORAL DAILY
Status: DISCONTINUED | OUTPATIENT
Start: 2019-08-08 | End: 2019-08-09

## 2019-08-08 RX ORDER — DEXTROSE MONOHYDRATE 25 G/50ML
12.5 INJECTION, SOLUTION INTRAVENOUS PRN
Status: DISCONTINUED | OUTPATIENT
Start: 2019-08-08 | End: 2019-08-21 | Stop reason: HOSPADM

## 2019-08-08 RX ORDER — FENTANYL 25 UG/H
1 PATCH TRANSDERMAL
COMMUNITY

## 2019-08-08 RX ORDER — OXYBUTYNIN CHLORIDE 10 MG/1
10 TABLET, EXTENDED RELEASE ORAL DAILY
COMMUNITY

## 2019-08-08 RX ORDER — MORPHINE SULFATE 4 MG/ML
4 INJECTION, SOLUTION INTRAMUSCULAR; INTRAVENOUS
Status: DISCONTINUED | OUTPATIENT
Start: 2019-08-08 | End: 2019-08-21 | Stop reason: HOSPADM

## 2019-08-08 RX ORDER — LANOLIN ALCOHOL/MO/W.PET/CERES
325 CREAM (GRAM) TOPICAL DAILY
Status: DISCONTINUED | OUTPATIENT
Start: 2019-08-08 | End: 2019-08-09

## 2019-08-08 RX ORDER — PROMETHAZINE HYDROCHLORIDE 25 MG/1
25 TABLET ORAL EVERY 6 HOURS PRN
COMMUNITY

## 2019-08-08 RX ORDER — VILAZODONE HYDROCHLORIDE 20 MG/1
20 TABLET ORAL DAILY
Status: DISCONTINUED | OUTPATIENT
Start: 2019-08-08 | End: 2019-08-09

## 2019-08-08 RX ORDER — TAMSULOSIN HYDROCHLORIDE 0.4 MG/1
0.4 CAPSULE ORAL DAILY
COMMUNITY

## 2019-08-08 RX ORDER — IBUPROFEN 200 MG
800 TABLET ORAL EVERY 8 HOURS PRN
Status: DISCONTINUED | OUTPATIENT
Start: 2019-08-08 | End: 2019-08-08 | Stop reason: ALTCHOICE

## 2019-08-08 RX ORDER — IBUPROFEN 800 MG/1
800 TABLET ORAL EVERY 8 HOURS PRN
Status: ON HOLD | COMMUNITY
End: 2019-08-21 | Stop reason: HOSPADM

## 2019-08-08 RX ORDER — LAMOTRIGINE 25 MG/1
25 TABLET ORAL 2 TIMES DAILY
Status: DISCONTINUED | OUTPATIENT
Start: 2019-08-08 | End: 2019-08-09

## 2019-08-08 RX ORDER — OMEPRAZOLE 20 MG/1
40 CAPSULE, DELAYED RELEASE ORAL DAILY
COMMUNITY

## 2019-08-08 RX ORDER — ERGOCALCIFEROL (VITAMIN D2) 1250 MCG
50000 CAPSULE ORAL WEEKLY
COMMUNITY

## 2019-08-08 RX ORDER — PANTOPRAZOLE SODIUM 40 MG/10ML
40 INJECTION, POWDER, LYOPHILIZED, FOR SOLUTION INTRAVENOUS 2 TIMES DAILY
Status: DISCONTINUED | OUTPATIENT
Start: 2019-08-08 | End: 2019-08-09

## 2019-08-08 RX ORDER — TRAZODONE HYDROCHLORIDE 50 MG/1
25 TABLET ORAL NIGHTLY PRN
Status: DISCONTINUED | OUTPATIENT
Start: 2019-08-08 | End: 2019-08-08

## 2019-08-08 RX ORDER — BENZTROPINE MESYLATE 1 MG/ML
2 INJECTION INTRAMUSCULAR; INTRAVENOUS DAILY PRN
COMMUNITY

## 2019-08-08 RX ORDER — ONDANSETRON 4 MG/1
4 TABLET, ORALLY DISINTEGRATING ORAL EVERY 6 HOURS PRN
Status: DISCONTINUED | OUTPATIENT
Start: 2019-08-08 | End: 2019-08-09

## 2019-08-08 RX ADMIN — CEFTRIAXONE SODIUM 1 G: 1 INJECTION, POWDER, FOR SOLUTION INTRAMUSCULAR; INTRAVENOUS at 13:37

## 2019-08-08 RX ADMIN — IOPAMIDOL 75 ML: 755 INJECTION, SOLUTION INTRAVENOUS at 12:28

## 2019-08-08 RX ADMIN — Medication: at 23:10

## 2019-08-08 RX ADMIN — SODIUM CHLORIDE 2000 ML: 9 INJECTION, SOLUTION INTRAVENOUS at 10:00

## 2019-08-08 RX ADMIN — SODIUM CHLORIDE 250 ML: 0.9 INJECTION, SOLUTION INTRAVENOUS at 20:45

## 2019-08-08 RX ADMIN — ONDANSETRON 4 MG: 2 INJECTION INTRAMUSCULAR; INTRAVENOUS at 10:23

## 2019-08-08 RX ADMIN — Medication 10 ML: at 21:00

## 2019-08-08 RX ADMIN — SODIUM CHLORIDE 80 ML: 9 INJECTION, SOLUTION INTRAVENOUS at 12:29

## 2019-08-08 RX ADMIN — Medication 10 ML: at 12:29

## 2019-08-08 RX ADMIN — SODIUM CHLORIDE: 9 INJECTION, SOLUTION INTRAVENOUS at 19:49

## 2019-08-08 ASSESSMENT — ENCOUNTER SYMPTOMS
EYE DISCHARGE: 0
ABDOMINAL DISTENTION: 1
SHORTNESS OF BREATH: 1
CHEST TIGHTNESS: 0
VOMITING: 1
SHORTNESS OF BREATH: 0
EYES NEGATIVE: 1
CONSTIPATION: 1
ABDOMINAL PAIN: 1
DIARRHEA: 1
EYE PAIN: 0
NAUSEA: 1
BACK PAIN: 0
FACIAL SWELLING: 0

## 2019-08-08 ASSESSMENT — PAIN SCALES - GENERAL: PAINLEVEL_OUTOF10: 6

## 2019-08-08 NOTE — H&P
Infuse 2 mg intravenously daily as needed (Dystonia reaction, call MD after admin)   Yes Historical Provider, MD   diphenhydrAMINE (BENADRYL) 25 MG capsule Take 25 mg by mouth every 6 hours as needed for Itching (puritis)   Yes Historical Provider, MD   oxybutynin (DITROPAN-XL) 10 MG extended release tablet Take 10 mg by mouth daily   Yes Historical Provider, MD   ergocalciferol (ERGOCALCIFEROL) 03200 units capsule Take 50,000 Units by mouth once a week Wednesdays   Yes Historical Provider, MD   fentaNYL (DURAGESIC) 25 MCG/HR Place 1 patch onto the skin every 72 hours. Yes Historical Provider, MD   famotidine (PEPCID) 20 MG tablet Take 20 mg by mouth 2 times daily   Yes Historical Provider, MD   tamsulosin (FLOMAX) 0.4 MG capsule Take 0.4 mg by mouth daily   Yes Historical Provider, MD   gabapentin (NEURONTIN) 100 MG capsule Take 100 mg by mouth 2 times daily. Yes Historical Provider, MD   ibuprofen (ADVIL;MOTRIN) 800 MG tablet Take 800 mg by mouth every 8 hours as needed for Pain   Yes Historical Provider, MD   lamoTRIgine (LAMICTAL) 25 MG tablet Take 25 mg by mouth 2 times daily   Yes Historical Provider, MD   magnesium hydroxide (MILK OF MAGNESIA) 400 MG/5ML suspension Take 30 mLs by mouth daily as needed for Constipation   Yes Historical Provider, MD   HYDROcodone-acetaminophen (NORCO) 5-325 MG per tablet Take 1 tablet by mouth every 6 hours as needed for Pain.    Yes Historical Provider, MD   omeprazole (PRILOSEC) 20 MG delayed release capsule Take 40 mg by mouth daily   Yes Historical Provider, MD   promethazine (PHENERGAN) 25 MG tablet Take 25 mg by mouth every 6 hours as needed for Nausea (vomiting)   Yes Historical Provider, MD   traZODone (DESYREL) 50 MG tablet Take 25 mg by mouth nightly as needed for Sleep   Yes Historical Provider, MD   vilazodone HCl (VILAZODONE HCL) 20 MG TABS Take 20 mg by mouth daily   Yes Historical Provider, MD   ferrous sulfate (TYRONE-INNA) 325 (65 FE) MG TABS Take 325 mg by 33.5 pg    MCHC 32.9 28.4 - 34.8 g/dL    RDW 13.5 11.8 - 14.4 %    Platelets 609 911 - 108 k/uL    MPV 10.6 8.1 - 13.5 fL    NRBC Automated 0.0 0.0 per 100 WBC    Differential Type NOT REPORTED     WBC Morphology NOT REPORTED     RBC Morphology NOT REPORTED     Platelet Estimate NOT REPORTED     Seg Neutrophils 87 (H) 36 - 66 %    Lymphocytes 3 (L) 24 - 44 %    Monocytes 9 (H) 1 - 7 %    Eosinophils % 0 (L) 1 - 4 %    Basophils 0 %    Immature Granulocytes 1 (H) 0 %    Segs Absolute 19.40 (H) 1.8 - 7.7 k/uL    Absolute Lymph # 0.67 (L) 1.0 - 4.8 k/uL    Absolute Mono # 2.01 (H) 0.2 - 0.8 k/uL    Absolute Eos # 0.00 0.0 - 0.4 k/uL    Basophils # 0.00 0.0 - 0.2 k/uL    Absolute Immature Granulocyte 0.22 0.00 - 0.30 k/uL   Ammonia    Collection Time: 08/08/19 10:00 AM   Result Value Ref Range    Ammonia 76 (H) 16 - 60 umol/L   SPECIMEN REJECTION    Collection Time: 08/08/19 10:00 AM   Result Value Ref Range    Specimen Source BLOOD     Ordered Test CHAD,CMPX,FT4,LIP,TROPI,TSH,LACDS,PT,PTT     Reason for Rejection Unable to perform testing: Specimen hemolyzed.      - NOT REPORTED    Amylase    Collection Time: 08/08/19 10:11 AM   Result Value Ref Range    Amylase 52 28 - 100 U/L   Comprehensive Metabolic Panel w/ Reflex to MG    Collection Time: 08/08/19 10:11 AM   Result Value Ref Range    Glucose 195 (H) 70 - 99 mg/dL    BUN 21 (H) 6 - 20 mg/dL    CREATININE 1.09 0.70 - 1.20 mg/dL    Bun/Cre Ratio 19 9 - 20    Calcium 9.9 8.6 - 10.4 mg/dL    Sodium 137 135 - 144 mmol/L    Potassium 4.9 3.7 - 5.3 mmol/L    Chloride 101 98 - 107 mmol/L    CO2 13 (L) 20 - 31 mmol/L    Anion Gap 23 (H) 9 - 17 mmol/L    Alkaline Phosphatase 130 (H) 40 - 129 U/L    ALT 47 (H) 5 - 41 U/L    AST 59 (H) <40 U/L    Total Bilirubin 0.94 0.3 - 1.2 mg/dL    Total Protein 7.8 6.4 - 8.3 g/dL    Alb 4.6 3.5 - 5.2 g/dL    Albumin/Globulin Ratio NOT REPORTED 1.0 - 2.5    GFR Non-African American >60 >60 mL/min    GFR African American >60 >60 mL/min

## 2019-08-08 NOTE — ED NOTES
icu bed assignment obtained. Verbal report given to icu. Nurse. Doctor shonda notified of condition by  Do david. Patient being prepared to inset n/g tube and then patient will be transferred to icu.       Jaspreet Sigala RN  08/08/19 0742

## 2019-08-08 NOTE — ED NOTES
Advised by doctor david that she had contacted doctor merchant and he requested that a n/g tube be inserted before admission to  [facility]. Patient had another  Blackish foul smelling stool. . Patient cleaned and # 14 n/g tube inserted in left nares. And obtained 600 ml of dark brownish  clolored gastric drainage. . Patient remains alert/oriented. And being prepared for transport to icu.       Dinorah Cannon RN  08/08/19 [id number]

## 2019-08-08 NOTE — PROGRESS NOTES
Transitions of Care Pharmacy Service   Medication Review    The patient's list of current home medications has been reviewed and updated. Source(s) of information: MAR (Kristin [id number])    Please feel free to call with any questions about this encounter. Thank you. Joya Ahumada, [facility]  Transitions of Care Pharmacy Service  Phone:  [phone]  Fax: [phone]    Prior to Admission medications    Medication Sig Start Date End Date Taking? Authorizing Provider   lubiprostone (AMITIZA) 24 MCG capsule Take 24 mcg by mouth 2 times daily as needed for Constipation   Yes Historical Provider, MD   benztropine mesylate (COGENTIN) 1 MG/ML injection Infuse 2 mg intravenously daily as needed (Dystonia reaction, call MD after admin)   Yes Historical Provider, MD   diphenhydrAMINE (BENADRYL) 25 MG capsule Take 25 mg by mouth every 6 hours as needed for Itching (puritis)   Yes Historical Provider, MD   oxybutynin (DITROPAN-XL) 10 MG extended release tablet Take 10 mg by mouth daily   Yes Historical Provider, MD   ergocalciferol (ERGOCALCIFEROL) 22172 units capsule Take 50,000 Units by mouth once a week Wednesdays   Yes Historical Provider, MD   fentaNYL (DURAGESIC) 25 MCG/HR Place 1 patch onto the skin every 72 hours. Yes Historical Provider, MD   famotidine (PEPCID) 20 MG tablet Take 20 mg by mouth 2 times daily   Yes Historical Provider, MD   tamsulosin (FLOMAX) 0.4 MG capsule Take 0.4 mg by mouth daily   Yes Historical Provider, MD   gabapentin (NEURONTIN) 100 MG capsule Take 100 mg by mouth 2 times daily.    Yes Historical Provider, MD   ibuprofen (ADVIL;MOTRIN) 800 MG tablet Take 800 mg by mouth every 8 hours as needed for Pain   Yes Historical Provider, MD   lamoTRIgine (LAMICTAL) 25 MG tablet Take 25 mg by mouth 2 times daily   Yes Historical Provider, MD   magnesium hydroxide (MILK OF MAGNESIA) 400 MG/5ML suspension Take 30 mLs by mouth daily as needed for Constipation   Yes Historical Provider, MD

## 2019-08-08 NOTE — ED PROVIDER NOTES
SURGICAL HISTORY       Past Surgical History:   Procedure Laterality Date    CHOLECYSTECTOMY      EYE SURGERY Right     lazy eye    TESTICLE SURGERY Left     URETHRA SURGERY       CURRENT MEDICATIONS       Previous Medications    BENZTROPINE MESYLATE (COGENTIN) 1 MG/ML INJECTION    Infuse 2 mg intravenously daily as needed (Dystonia reaction, call MD after admin)    DIPHENHYDRAMINE (BENADRYL) 25 MG CAPSULE    Take 25 mg by mouth every 6 hours as needed for Itching (puritis)    ERGOCALCIFEROL (ERGOCALCIFEROL) 72650 UNITS CAPSULE    Take 50,000 Units by mouth once a week Wednesdays    FAMOTIDINE (PEPCID) 20 MG TABLET    Take 20 mg by mouth 2 times daily    FENTANYL (DURAGESIC) 25 MCG/HR    Place 1 patch onto the skin every 72 hours. FERROUS SULFATE (TYRONE-INNA) 325 (65 FE) MG TABS    Take 325 mg by mouth daily    GABAPENTIN (NEURONTIN) 100 MG CAPSULE    Take 100 mg by mouth 2 times daily. HYDROCODONE-ACETAMINOPHEN (NORCO) 5-325 MG PER TABLET    Take 1 tablet by mouth every 6 hours as needed for Pain.     IBUPROFEN (ADVIL;MOTRIN) 800 MG TABLET    Take 800 mg by mouth every 8 hours as needed for Pain    LAMOTRIGINE (LAMICTAL) 25 MG TABLET    Take 25 mg by mouth 2 times daily    LUBIPROSTONE (AMITIZA) 24 MCG CAPSULE    Take 24 mcg by mouth 2 times daily as needed for Constipation    MAGNESIUM HYDROXIDE (MILK OF MAGNESIA) 400 MG/5ML SUSPENSION    Take 30 mLs by mouth daily as needed for Constipation    MINERAL OIL ENEMA    Place 1 enema rectally once    OMEPRAZOLE (PRILOSEC) 20 MG DELAYED RELEASE CAPSULE    Take 40 mg by mouth daily    OXYBUTYNIN (DITROPAN-XL) 10 MG EXTENDED RELEASE TABLET    Take 10 mg by mouth daily    POLYETHYLENE GLYCOL (GLYCOLAX) PACKET    Take 17 g by mouth daily as needed    PROMETHAZINE (PHENERGAN) 25 MG TABLET    Take 25 mg by mouth every 6 hours as needed for Nausea (vomiting)    TAMSULOSIN (FLOMAX) 0.4 MG CAPSULE    Take 0.4 mg by mouth daily    TRAZODONE (DESYREL) 50 MG TABLET Take 25 mg by mouth nightly as needed for Sleep    VILAZODONE HCL (VILAZODONE HCL) 20 MG TABS    Take 20 mg by mouth daily     ALLERGIES     is allergic to pollen extract. FAMILY HISTORY     has no family status information on file. SOCIAL HISTORY       Social History     Tobacco Use    Smoking status: Current Every Day Smoker     Packs/day: 1.00     Years: 30.00     Pack years: 30.00     Types: Cigarettes    Smokeless tobacco: Never Used   Substance Use Topics    Alcohol use: No     Alcohol/week: 0.0 standard drinks    Drug use: No     PHYSICAL EXAM     INITIAL VITALS: /85   Pulse 52   Temp 98.9 °F (37.2 °C) (Oral)   Resp 22   Wt 150 lb (68 kg)   SpO2 98%   BMI 24.96 kg/m²    Physical Exam   Constitutional: He appears distressed. HENT:   Head: Normocephalic and atraumatic. Eyes: Pupils are equal, round, and reactive to light. EOM are normal.   Neck: Normal range of motion. Cardiovascular:   Tachycardic   Pulmonary/Chest: No respiratory distress. Abdominal: He exhibits distension. Musculoskeletal:   Bilateral Lower extremity contracted. Skin: He is diaphoretic. Nursing note and vitals reviewed. MEDICAL DECISION MAKING:   The patient was seen and examined. Patient is a 42-year-old male who presented to the emergency  department secondary to altered mental status with nausea vomiting and abdominal pain. Differential diagnosis included sepsis secondary to UTI, versus pneumonia, small bowel obstruction, dehydration, TIA versus stroke. Sugar obtained on arrival.  The patient was noted to be tachycardic at a rate of 144 however blood pressure 113/84, given concern for possible sepsis, sepsis order set initiated with lactic acid x2, blood cultures x2 as well as IV fluids 30 cc/kg. Orders for UA with urine culture, chest x-ray, CT head without contrast as well as other labs. Patient will be reevaluated and likely will require admission.   Positive leukocytosis of 22 and a lactic acid of 3.9 consistent with likely sepsis no source of urinary tract infection therefore sepsis identified at 11:30 AM.  Patient initially on antibiotics with ceftriaxone 1 g IV piggyback. Patient's heart rate improved to 127, and blood pressure remained stable. CT scan positive for moderate amount of stool however cannot rule out obstruction given patient's contractor body habitus, patient discussed with radiology with recommendation for CT abdomen pelvis of chest to rule out obstruction. Patient has a moderate amount of stool likely secondary to fecal impaction. Repeat sepsis exam at 14:00. Repeat lactic acid decreased at 2.9. CT chest concerning for left lower lobe lung collapse as well as fecal impaction. Given this patient will be admitted for IV antibiotics and GI consultation for fecal impaction. Patient discussed with Dr. Misty Carrizales who will follow patient, he requested that a NG tube be placed. Orders for NG tube and KUB placed. CRITICAL CARE:              NIH STROKE SCALE:            PROCEDURES:    Procedures    DIAGNOSTIC RESULTS   EKG:All EKG's are interpreted by the Emergency Department Physician who either signs or Co-signs this chart in the absence of a cardiologist.        RADIOLOGY:All plain film, CT, MRI, and formal ultrasound images (except ED bedside ultrasound) are read by the radiologist, see reports below, unless otherwisenoted in MDM or here. CT CHEST WO CONTRAST   Preliminary Result   Left lower lobe collapse with resultant elevation of left hemidiaphragm and   extension of the stomach and left colon into the thoracic cavity with a large   amount of stool. Superimposed left lower lobe pneumonia is not excluded. No acute osseous abnormality. CT ABDOMEN PELVIS W IV CONTRAST Additional Contrast? None   Preliminary Result   1. Markedly dilated rectosigmoid colon with mild dilation of the remaining   colon.   Portions of the stomach and colon are

## 2019-08-08 NOTE — ED NOTES
Martinez cath inserted  And has very dark brownish coffee  Colored urine and specimen sent to lab and doctor joann notified.       Cain Escudero RN  08/08/19 1109

## 2019-08-09 ENCOUNTER — ANESTHESIA EVENT (OUTPATIENT)
Dept: OPERATING ROOM | Age: 44
DRG: 854 | End: 2019-08-09
Payer: MEDICARE

## 2019-08-09 ENCOUNTER — ANESTHESIA (OUTPATIENT)
Dept: OPERATING ROOM | Age: 44
DRG: 854 | End: 2019-08-09
Payer: MEDICARE

## 2019-08-09 VITALS — OXYGEN SATURATION: 100 % | SYSTOLIC BLOOD PRESSURE: 132 MMHG | DIASTOLIC BLOOD PRESSURE: 78 MMHG | TEMPERATURE: 97.5 F

## 2019-08-09 PROBLEM — K59.04 CHRONIC IDIOPATHIC CONSTIPATION: Status: ACTIVE | Noted: 2019-08-09

## 2019-08-09 PROBLEM — R10.84 GENERALIZED ABDOMINAL PAIN: Status: ACTIVE | Noted: 2019-08-09

## 2019-08-09 LAB
ABSOLUTE EOS #: 0 K/UL (ref 0–0.4)
ABSOLUTE IMMATURE GRANULOCYTE: 0 K/UL (ref 0–0.3)
ABSOLUTE LYMPH #: 1.66 K/UL (ref 1–4.8)
ABSOLUTE MONO #: 2.62 K/UL (ref 0.2–0.8)
ANION GAP SERPL CALCULATED.3IONS-SCNC: 12 MMOL/L (ref 9–17)
ANION GAP SERPL CALCULATED.3IONS-SCNC: 13 MMOL/L (ref 9–17)
ATYPICAL LYMPHOCYTE ABSOLUTE COUNT: 0.14 K/UL
ATYPICAL LYMPHOCYTES: 1 %
BASOPHILS # BLD: 1 %
BASOPHILS ABSOLUTE: 0.14 K/UL (ref 0–0.2)
BUN BLDV-MCNC: 25 MG/DL (ref 6–20)
BUN BLDV-MCNC: 25 MG/DL (ref 6–20)
BUN/CREAT BLD: 40 (ref 9–20)
BUN/CREAT BLD: 41 (ref 9–20)
C DIFF AG + TOXIN: NEGATIVE
CALCIUM SERPL-MCNC: 8 MG/DL (ref 8.6–10.4)
CALCIUM SERPL-MCNC: 8.1 MG/DL (ref 8.6–10.4)
CAMPYLOBACTER PCR: NORMAL
CHLORIDE BLD-SCNC: 107 MMOL/L (ref 98–107)
CHLORIDE BLD-SCNC: 108 MMOL/L (ref 98–107)
CO2: 17 MMOL/L (ref 20–31)
CO2: 20 MMOL/L (ref 20–31)
CREAT SERPL-MCNC: 0.61 MG/DL (ref 0.7–1.2)
CREAT SERPL-MCNC: 0.62 MG/DL (ref 0.7–1.2)
CULTURE: NO GROWTH
DIFFERENTIAL TYPE: ABNORMAL
E COLI ENTEROTOXIGENIC PCR: NORMAL
EKG ATRIAL RATE: 145 BPM
EKG P AXIS: 11 DEGREES
EKG P-R INTERVAL: 126 MS
EKG Q-T INTERVAL: 278 MS
EKG QRS DURATION: 66 MS
EKG QTC CALCULATION (BAZETT): 431 MS
EKG R AXIS: 17 DEGREES
EKG T AXIS: 49 DEGREES
EKG VENTRICULAR RATE: 145 BPM
EOSINOPHILS RELATIVE PERCENT: 0 % (ref 1–4)
ESTIMATED AVERAGE GLUCOSE: 111 MG/DL
GFR AFRICAN AMERICAN: >60 ML/MIN
GFR AFRICAN AMERICAN: >60 ML/MIN
GFR NON-AFRICAN AMERICAN: >60 ML/MIN
GFR NON-AFRICAN AMERICAN: >60 ML/MIN
GFR SERPL CREATININE-BSD FRML MDRD: ABNORMAL ML/MIN/{1.73_M2}
GLUCOSE BLD-MCNC: 109 MG/DL (ref 75–110)
GLUCOSE BLD-MCNC: 113 MG/DL (ref 70–99)
GLUCOSE BLD-MCNC: 118 MG/DL (ref 70–99)
GLUCOSE BLD-MCNC: 133 MG/DL (ref 75–110)
GLUCOSE BLD-MCNC: 97 MG/DL (ref 75–110)
HBA1C MFR BLD: 5.5 % (ref 4–6)
HCT VFR BLD CALC: 42.6 % (ref 40.7–50.3)
HCT VFR BLD CALC: 47.9 % (ref 40.7–50.3)
HEMOGLOBIN: 14.1 G/DL (ref 13–17)
HEMOGLOBIN: 15.3 G/DL (ref 13–17)
IMMATURE GRANULOCYTES: 0 %
INR BLD: 1
LACTIC ACID: 1.1 MMOL/L (ref 0.5–2.2)
LYMPHOCYTES # BLD: 12 % (ref 24–44)
Lab: NORMAL
MCH RBC QN AUTO: 29.9 PG (ref 25.2–33.5)
MCH RBC QN AUTO: 30.3 PG (ref 25.2–33.5)
MCHC RBC AUTO-ENTMCNC: 31.9 G/DL (ref 28–38)
MCHC RBC AUTO-ENTMCNC: 33.1 G/DL (ref 28.4–34.8)
MCV RBC AUTO: 91.4 FL (ref 82.6–102.9)
MCV RBC AUTO: 93.7 FL (ref 82.6–102.9)
MONOCYTES # BLD: 19 % (ref 1–7)
MORPHOLOGY: ABNORMAL
MORPHOLOGY: ABNORMAL
NRBC AUTOMATED: 0 PER 100 WBC
NRBC AUTOMATED: ABNORMAL PER 100 WBC
PDW BLD-RTO: 13.4 % (ref 11.8–14.4)
PDW BLD-RTO: 13.7 % (ref 11.8–14.4)
PLATELET # BLD: 268 K/UL (ref 138–453)
PLATELET # BLD: 328 K/UL (ref 138–453)
PLATELET ESTIMATE: ABNORMAL
PLESIOMONAS SHIGELLOIDES PCR: NORMAL
PMV BLD AUTO: 9.1 FL (ref 8.1–13.5)
PMV BLD AUTO: 9.1 FL (ref 8.1–13.5)
POTASSIUM SERPL-SCNC: 4 MMOL/L (ref 3.7–5.3)
POTASSIUM SERPL-SCNC: 4.4 MMOL/L (ref 3.7–5.3)
PROCALCITONIN: 11 NG/ML
PROTHROMBIN TIME: 10.7 SEC (ref 9.7–11.6)
RBC # BLD: 4.66 M/UL (ref 4.21–5.77)
RBC # BLD: 5.11 M/UL (ref 4.21–5.77)
RBC # BLD: ABNORMAL 10*6/UL
SALMONELLA PCR: NORMAL
SEG NEUTROPHILS: 67 % (ref 36–66)
SEGMENTED NEUTROPHILS ABSOLUTE COUNT: 9.24 K/UL (ref 1.8–7.7)
SHIGATOXIN GENE PCR: NORMAL
SHIGELLA SP PCR: NORMAL
SODIUM BLD-SCNC: 138 MMOL/L (ref 135–144)
SODIUM BLD-SCNC: 139 MMOL/L (ref 135–144)
SPECIMEN DESCRIPTION: NORMAL
VIBRIO PCR: NORMAL
WBC # BLD: 12 K/UL (ref 3.5–11.3)
WBC # BLD: 13.8 K/UL (ref 3.5–11.3)
WBC # BLD: ABNORMAL 10*3/UL
YERSINIA ENTEROCOLITICA PCR: NORMAL

## 2019-08-09 PROCEDURE — 6360000002 HC RX W HCPCS: Performed by: NURSE ANESTHETIST, CERTIFIED REGISTERED

## 2019-08-09 PROCEDURE — 83605 ASSAY OF LACTIC ACID: CPT

## 2019-08-09 PROCEDURE — 2580000003 HC RX 258: Performed by: ANESTHESIOLOGY

## 2019-08-09 PROCEDURE — 85025 COMPLETE CBC W/AUTO DIFF WBC: CPT

## 2019-08-09 PROCEDURE — 2500000003 HC RX 250 WO HCPCS: Performed by: NURSE ANESTHETIST, CERTIFIED REGISTERED

## 2019-08-09 PROCEDURE — 82947 ASSAY GLUCOSE BLOOD QUANT: CPT

## 2019-08-09 PROCEDURE — 2580000003 HC RX 258: Performed by: SURGERY

## 2019-08-09 PROCEDURE — 6360000002 HC RX W HCPCS: Performed by: SURGERY

## 2019-08-09 PROCEDURE — APPNB180 APP NON BILLABLE TIME > 60 MINS: Performed by: NURSE PRACTITIONER

## 2019-08-09 PROCEDURE — 2500000003 HC RX 250 WO HCPCS: Performed by: NURSE PRACTITIONER

## 2019-08-09 PROCEDURE — C1765 ADHESION BARRIER: HCPCS | Performed by: SURGERY

## 2019-08-09 PROCEDURE — 3700000000 HC ANESTHESIA ATTENDED CARE: Performed by: SURGERY

## 2019-08-09 PROCEDURE — 2000000000 HC ICU R&B

## 2019-08-09 PROCEDURE — 2580000003 HC RX 258: Performed by: NURSE PRACTITIONER

## 2019-08-09 PROCEDURE — 7100000000 HC PACU RECOVERY - FIRST 15 MIN: Performed by: SURGERY

## 2019-08-09 PROCEDURE — C9113 INJ PANTOPRAZOLE SODIUM, VIA: HCPCS | Performed by: NURSE PRACTITIONER

## 2019-08-09 PROCEDURE — 99232 SBSQ HOSP IP/OBS MODERATE 35: CPT | Performed by: INTERNAL MEDICINE

## 2019-08-09 PROCEDURE — 6370000000 HC RX 637 (ALT 250 FOR IP): Performed by: NURSE PRACTITIONER

## 2019-08-09 PROCEDURE — 83036 HEMOGLOBIN GLYCOSYLATED A1C: CPT

## 2019-08-09 PROCEDURE — 7100000001 HC PACU RECOVERY - ADDTL 15 MIN: Performed by: SURGERY

## 2019-08-09 PROCEDURE — 2500000003 HC RX 250 WO HCPCS: Performed by: SURGERY

## 2019-08-09 PROCEDURE — 85610 PROTHROMBIN TIME: CPT

## 2019-08-09 PROCEDURE — 93010 ELECTROCARDIOGRAM REPORT: CPT | Performed by: INTERNAL MEDICINE

## 2019-08-09 PROCEDURE — 2720000010 HC SURG SUPPLY STERILE: Performed by: SURGERY

## 2019-08-09 PROCEDURE — 0D1M0Z4 BYPASS DESCENDING COLON TO CUTANEOUS, OPEN APPROACH: ICD-10-PCS | Performed by: SURGERY

## 2019-08-09 PROCEDURE — 80048 BASIC METABOLIC PNL TOTAL CA: CPT

## 2019-08-09 PROCEDURE — 2580000003 HC RX 258: Performed by: EMERGENCY MEDICINE

## 2019-08-09 PROCEDURE — 85027 COMPLETE CBC AUTOMATED: CPT

## 2019-08-09 PROCEDURE — 2580000003 HC RX 258: Performed by: INTERNAL MEDICINE

## 2019-08-09 PROCEDURE — 51702 INSERT TEMP BLADDER CATH: CPT

## 2019-08-09 PROCEDURE — 2709999900 HC NON-CHARGEABLE SUPPLY: Performed by: SURGERY

## 2019-08-09 PROCEDURE — 2580000003 HC RX 258: Performed by: NURSE ANESTHETIST, CERTIFIED REGISTERED

## 2019-08-09 PROCEDURE — 84145 PROCALCITONIN (PCT): CPT

## 2019-08-09 PROCEDURE — 36415 COLL VENOUS BLD VENIPUNCTURE: CPT

## 2019-08-09 PROCEDURE — 6360000002 HC RX W HCPCS: Performed by: NURSE PRACTITIONER

## 2019-08-09 PROCEDURE — 3600000002 HC SURGERY LEVEL 2 BASE: Performed by: SURGERY

## 2019-08-09 PROCEDURE — 6360000002 HC RX W HCPCS: Performed by: ANESTHESIOLOGY

## 2019-08-09 PROCEDURE — 0DTJ0ZZ RESECTION OF APPENDIX, OPEN APPROACH: ICD-10-PCS | Performed by: SURGERY

## 2019-08-09 PROCEDURE — 6360000002 HC RX W HCPCS: Performed by: INTERNAL MEDICINE

## 2019-08-09 PROCEDURE — 0DTG0ZZ RESECTION OF LEFT LARGE INTESTINE, OPEN APPROACH: ICD-10-PCS | Performed by: SURGERY

## 2019-08-09 PROCEDURE — 3700000001 HC ADD 15 MINUTES (ANESTHESIA): Performed by: SURGERY

## 2019-08-09 PROCEDURE — 99222 1ST HOSP IP/OBS MODERATE 55: CPT | Performed by: INTERNAL MEDICINE

## 2019-08-09 PROCEDURE — 6370000000 HC RX 637 (ALT 250 FOR IP): Performed by: SURGERY

## 2019-08-09 PROCEDURE — 3600000012 HC SURGERY LEVEL 2 ADDTL 15MIN: Performed by: SURGERY

## 2019-08-09 RX ORDER — MAGNESIUM HYDROXIDE 1200 MG/15ML
LIQUID ORAL CONTINUOUS PRN
Status: COMPLETED | OUTPATIENT
Start: 2019-08-09 | End: 2019-08-09

## 2019-08-09 RX ORDER — LANOLIN ALCOHOL/MO/W.PET/CERES
325 CREAM (GRAM) TOPICAL DAILY
Status: DISCONTINUED | OUTPATIENT
Start: 2019-08-10 | End: 2019-08-21 | Stop reason: HOSPADM

## 2019-08-09 RX ORDER — FENTANYL 25 UG/H
1 PATCH TRANSDERMAL
Status: DISCONTINUED | OUTPATIENT
Start: 2019-08-11 | End: 2019-08-21 | Stop reason: HOSPADM

## 2019-08-09 RX ORDER — FENTANYL CITRATE 50 UG/ML
50 INJECTION, SOLUTION INTRAMUSCULAR; INTRAVENOUS EVERY 5 MIN PRN
Status: DISCONTINUED | OUTPATIENT
Start: 2019-08-09 | End: 2019-08-09 | Stop reason: HOSPADM

## 2019-08-09 RX ORDER — PANTOPRAZOLE SODIUM 40 MG/1
40 TABLET, DELAYED RELEASE ORAL DAILY
Status: DISCONTINUED | OUTPATIENT
Start: 2019-08-09 | End: 2019-08-13

## 2019-08-09 RX ORDER — VILAZODONE HYDROCHLORIDE 20 MG/1
20 TABLET ORAL DAILY
Status: DISCONTINUED | OUTPATIENT
Start: 2019-08-10 | End: 2019-08-21 | Stop reason: HOSPADM

## 2019-08-09 RX ORDER — FENTANYL CITRATE 50 UG/ML
25 INJECTION, SOLUTION INTRAMUSCULAR; INTRAVENOUS EVERY 5 MIN PRN
Status: DISCONTINUED | OUTPATIENT
Start: 2019-08-09 | End: 2019-08-09 | Stop reason: HOSPADM

## 2019-08-09 RX ORDER — ERGOCALCIFEROL 1.25 MG/1
50000 CAPSULE ORAL WEEKLY
Status: DISCONTINUED | OUTPATIENT
Start: 2019-08-15 | End: 2019-08-21 | Stop reason: HOSPADM

## 2019-08-09 RX ORDER — FENTANYL CITRATE 50 UG/ML
INJECTION, SOLUTION INTRAMUSCULAR; INTRAVENOUS PRN
Status: DISCONTINUED | OUTPATIENT
Start: 2019-08-09 | End: 2019-08-09 | Stop reason: SDUPTHER

## 2019-08-09 RX ORDER — MIDAZOLAM HYDROCHLORIDE 1 MG/ML
INJECTION INTRAMUSCULAR; INTRAVENOUS PRN
Status: DISCONTINUED | OUTPATIENT
Start: 2019-08-09 | End: 2019-08-09 | Stop reason: SDUPTHER

## 2019-08-09 RX ORDER — SODIUM CHLORIDE 0.9 % (FLUSH) 0.9 %
10 SYRINGE (ML) INJECTION EVERY 12 HOURS SCHEDULED
Status: DISCONTINUED | OUTPATIENT
Start: 2019-08-09 | End: 2019-08-21 | Stop reason: HOSPADM

## 2019-08-09 RX ORDER — ROCURONIUM BROMIDE 10 MG/ML
INJECTION, SOLUTION INTRAVENOUS PRN
Status: DISCONTINUED | OUTPATIENT
Start: 2019-08-09 | End: 2019-08-09 | Stop reason: SDUPTHER

## 2019-08-09 RX ORDER — OXYCODONE HYDROCHLORIDE AND ACETAMINOPHEN 5; 325 MG/1; MG/1
1 TABLET ORAL
Status: DISCONTINUED | OUTPATIENT
Start: 2019-08-09 | End: 2019-08-09 | Stop reason: HOSPADM

## 2019-08-09 RX ORDER — GABAPENTIN 100 MG/1
100 CAPSULE ORAL 2 TIMES DAILY
Status: DISCONTINUED | OUTPATIENT
Start: 2019-08-09 | End: 2019-08-21 | Stop reason: HOSPADM

## 2019-08-09 RX ORDER — DEXAMETHASONE SODIUM PHOSPHATE 10 MG/ML
INJECTION INTRAMUSCULAR; INTRAVENOUS PRN
Status: DISCONTINUED | OUTPATIENT
Start: 2019-08-09 | End: 2019-08-09 | Stop reason: SDUPTHER

## 2019-08-09 RX ORDER — TAMSULOSIN HYDROCHLORIDE 0.4 MG/1
0.4 CAPSULE ORAL DAILY
Status: DISCONTINUED | OUTPATIENT
Start: 2019-08-10 | End: 2019-08-21 | Stop reason: HOSPADM

## 2019-08-09 RX ORDER — PROPOFOL 10 MG/ML
INJECTION, EMULSION INTRAVENOUS PRN
Status: DISCONTINUED | OUTPATIENT
Start: 2019-08-09 | End: 2019-08-09 | Stop reason: SDUPTHER

## 2019-08-09 RX ORDER — LIDOCAINE HYDROCHLORIDE 20 MG/ML
INJECTION, SOLUTION EPIDURAL; INFILTRATION; INTRACAUDAL; PERINEURAL PRN
Status: DISCONTINUED | OUTPATIENT
Start: 2019-08-09 | End: 2019-08-09 | Stop reason: SDUPTHER

## 2019-08-09 RX ORDER — ONDANSETRON 2 MG/ML
INJECTION INTRAMUSCULAR; INTRAVENOUS PRN
Status: DISCONTINUED | OUTPATIENT
Start: 2019-08-09 | End: 2019-08-09 | Stop reason: SDUPTHER

## 2019-08-09 RX ORDER — SODIUM CHLORIDE 0.9 % (FLUSH) 0.9 %
10 SYRINGE (ML) INJECTION PRN
Status: DISCONTINUED | OUTPATIENT
Start: 2019-08-09 | End: 2019-08-21 | Stop reason: HOSPADM

## 2019-08-09 RX ORDER — SODIUM CHLORIDE 9 MG/ML
INJECTION, SOLUTION INTRAVENOUS CONTINUOUS
Status: DISCONTINUED | OUTPATIENT
Start: 2019-08-09 | End: 2019-08-09

## 2019-08-09 RX ORDER — LAMOTRIGINE 25 MG/1
25 TABLET ORAL 2 TIMES DAILY
Status: DISCONTINUED | OUTPATIENT
Start: 2019-08-09 | End: 2019-08-21 | Stop reason: HOSPADM

## 2019-08-09 RX ORDER — SODIUM CHLORIDE, SODIUM LACTATE, POTASSIUM CHLORIDE, CALCIUM CHLORIDE 600; 310; 30; 20 MG/100ML; MG/100ML; MG/100ML; MG/100ML
INJECTION, SOLUTION INTRAVENOUS CONTINUOUS
Status: DISCONTINUED | OUTPATIENT
Start: 2019-08-09 | End: 2019-08-21 | Stop reason: HOSPADM

## 2019-08-09 RX ORDER — SODIUM CHLORIDE 9 MG/ML
INJECTION, SOLUTION INTRAVENOUS CONTINUOUS PRN
Status: DISCONTINUED | OUTPATIENT
Start: 2019-08-09 | End: 2019-08-09 | Stop reason: SDUPTHER

## 2019-08-09 RX ORDER — OXYBUTYNIN CHLORIDE 5 MG/1
10 TABLET, EXTENDED RELEASE ORAL DAILY
Status: DISCONTINUED | OUTPATIENT
Start: 2019-08-10 | End: 2019-08-21 | Stop reason: HOSPADM

## 2019-08-09 RX ORDER — 0.9 % SODIUM CHLORIDE 0.9 %
500 INTRAVENOUS SOLUTION INTRAVENOUS ONCE
Status: COMPLETED | OUTPATIENT
Start: 2019-08-09 | End: 2019-08-09

## 2019-08-09 RX ORDER — ONDANSETRON 2 MG/ML
4 INJECTION INTRAMUSCULAR; INTRAVENOUS EVERY 4 HOURS PRN
Status: DISCONTINUED | OUTPATIENT
Start: 2019-08-09 | End: 2019-08-21 | Stop reason: HOSPADM

## 2019-08-09 RX ORDER — ONDANSETRON 2 MG/ML
4 INJECTION INTRAMUSCULAR; INTRAVENOUS
Status: DISCONTINUED | OUTPATIENT
Start: 2019-08-09 | End: 2019-08-09 | Stop reason: HOSPADM

## 2019-08-09 RX ADMIN — Medication 10 ML: at 02:48

## 2019-08-09 RX ADMIN — PANTOPRAZOLE SODIUM 40 MG: 40 INJECTION, POWDER, FOR SOLUTION INTRAVENOUS at 08:44

## 2019-08-09 RX ADMIN — METRONIDAZOLE 500 MG: 500 INJECTION, SOLUTION INTRAVENOUS at 08:43

## 2019-08-09 RX ADMIN — PANTOPRAZOLE SODIUM 40 MG: 40 TABLET, DELAYED RELEASE ORAL at 22:54

## 2019-08-09 RX ADMIN — Medication 10 ML: at 08:42

## 2019-08-09 RX ADMIN — MORPHINE SULFATE 4 MG: 4 INJECTION INTRAVENOUS at 03:01

## 2019-08-09 RX ADMIN — FENTANYL CITRATE 100 MCG: 50 INJECTION, SOLUTION INTRAMUSCULAR; INTRAVENOUS at 15:26

## 2019-08-09 RX ADMIN — FENTANYL CITRATE 50 MCG: 50 INJECTION, SOLUTION INTRAMUSCULAR; INTRAVENOUS at 18:16

## 2019-08-09 RX ADMIN — METRONIDAZOLE 500 MG: 500 INJECTION, SOLUTION INTRAVENOUS at 16:48

## 2019-08-09 RX ADMIN — METRONIDAZOLE 500 MG: 500 INJECTION, SOLUTION INTRAVENOUS at 22:55

## 2019-08-09 RX ADMIN — METRONIDAZOLE 500 MG: 500 INJECTION, SOLUTION INTRAVENOUS at 02:47

## 2019-08-09 RX ADMIN — CEFTRIAXONE SODIUM 1 G: 1 INJECTION, POWDER, FOR SOLUTION INTRAMUSCULAR; INTRAVENOUS at 12:21

## 2019-08-09 RX ADMIN — LAMOTRIGINE 25 MG: 25 TABLET ORAL at 22:54

## 2019-08-09 RX ADMIN — NYSTATIN: 100000 CREAM TOPICAL at 08:40

## 2019-08-09 RX ADMIN — FENTANYL CITRATE 50 MCG: 50 INJECTION, SOLUTION INTRAMUSCULAR; INTRAVENOUS at 14:54

## 2019-08-09 RX ADMIN — NYSTATIN: 100000 CREAM TOPICAL at 22:55

## 2019-08-09 RX ADMIN — FENTANYL CITRATE 100 MCG: 50 INJECTION, SOLUTION INTRAMUSCULAR; INTRAVENOUS at 15:39

## 2019-08-09 RX ADMIN — NYSTATIN: 100000 CREAM TOPICAL at 02:11

## 2019-08-09 RX ADMIN — ROCURONIUM BROMIDE 20 MG: 10 INJECTION, SOLUTION INTRAVENOUS at 15:21

## 2019-08-09 RX ADMIN — GABAPENTIN 100 MG: 100 CAPSULE ORAL at 22:54

## 2019-08-09 RX ADMIN — MORPHINE SULFATE 2 MG: 2 INJECTION, SOLUTION INTRAMUSCULAR; INTRAVENOUS at 23:05

## 2019-08-09 RX ADMIN — ROCURONIUM BROMIDE 10 MG: 10 INJECTION, SOLUTION INTRAVENOUS at 16:49

## 2019-08-09 RX ADMIN — MORPHINE SULFATE 2 MG: 2 INJECTION, SOLUTION INTRAMUSCULAR; INTRAVENOUS at 20:31

## 2019-08-09 RX ADMIN — SODIUM CHLORIDE 2040 ML: 9 INJECTION, SOLUTION INTRAVENOUS at 08:31

## 2019-08-09 RX ADMIN — CEFTRIAXONE SODIUM 2 G: 2 INJECTION, POWDER, FOR SOLUTION INTRAMUSCULAR; INTRAVENOUS at 17:11

## 2019-08-09 RX ADMIN — MIDAZOLAM 2 MG: 1 INJECTION INTRAMUSCULAR; INTRAVENOUS at 14:53

## 2019-08-09 RX ADMIN — SODIUM CHLORIDE: 9 INJECTION, SOLUTION INTRAVENOUS at 14:53

## 2019-08-09 RX ADMIN — SODIUM CHLORIDE: 9 INJECTION, SOLUTION INTRAVENOUS at 16:20

## 2019-08-09 RX ADMIN — MORPHINE SULFATE 4 MG: 4 INJECTION INTRAVENOUS at 12:16

## 2019-08-09 RX ADMIN — ROCURONIUM BROMIDE 50 MG: 10 INJECTION, SOLUTION INTRAVENOUS at 14:55

## 2019-08-09 RX ADMIN — FENTANYL CITRATE 50 MCG: 50 INJECTION, SOLUTION INTRAMUSCULAR; INTRAVENOUS at 18:31

## 2019-08-09 RX ADMIN — PROPOFOL 140 MG: 10 INJECTION, EMULSION INTRAVENOUS at 14:55

## 2019-08-09 RX ADMIN — PANTOPRAZOLE SODIUM 40 MG: 40 INJECTION, POWDER, FOR SOLUTION INTRAVENOUS at 02:47

## 2019-08-09 RX ADMIN — ANTI-FUNGAL POWDER MICONAZOLE NITRATE TALC FREE: 1.42 POWDER TOPICAL at 08:40

## 2019-08-09 RX ADMIN — LIDOCAINE HYDROCHLORIDE 60 MG: 20 INJECTION, SOLUTION EPIDURAL; INFILTRATION; INTRACAUDAL; PERINEURAL at 14:55

## 2019-08-09 RX ADMIN — SODIUM CHLORIDE 500 ML: 9 INJECTION, SOLUTION INTRAVENOUS at 05:00

## 2019-08-09 RX ADMIN — PIPERACILLIN SODIUM,TAZOBACTAM SODIUM 3.38 G: 3; .375 INJECTION, POWDER, FOR SOLUTION INTRAVENOUS at 02:47

## 2019-08-09 RX ADMIN — ANTI-FUNGAL POWDER MICONAZOLE NITRATE TALC FREE: 1.42 POWDER TOPICAL at 02:11

## 2019-08-09 RX ADMIN — HYDROMORPHONE HYDROCHLORIDE 1 MG: 1 INJECTION, SOLUTION INTRAMUSCULAR; INTRAVENOUS; SUBCUTANEOUS at 15:52

## 2019-08-09 RX ADMIN — PIPERACILLIN SODIUM,TAZOBACTAM SODIUM 3.38 G: 3; .375 INJECTION, POWDER, FOR SOLUTION INTRAVENOUS at 11:03

## 2019-08-09 RX ADMIN — DEXAMETHASONE SODIUM PHOSPHATE 10 MG: 10 INJECTION INTRAMUSCULAR; INTRAVENOUS at 15:00

## 2019-08-09 RX ADMIN — ANTI-FUNGAL POWDER MICONAZOLE NITRATE TALC FREE: 1.42 POWDER TOPICAL at 22:55

## 2019-08-09 RX ADMIN — SODIUM CHLORIDE: 9 INJECTION, SOLUTION INTRAVENOUS at 02:07

## 2019-08-09 RX ADMIN — HYDROMORPHONE HYDROCHLORIDE 1 MG: 1 INJECTION, SOLUTION INTRAMUSCULAR; INTRAVENOUS; SUBCUTANEOUS at 16:01

## 2019-08-09 RX ADMIN — ONDANSETRON 4 MG: 2 INJECTION, SOLUTION INTRAMUSCULAR; INTRAVENOUS at 16:47

## 2019-08-09 RX ADMIN — MORPHINE SULFATE 4 MG: 4 INJECTION INTRAVENOUS at 08:34

## 2019-08-09 ASSESSMENT — PULMONARY FUNCTION TESTS
PIF_VALUE: 12
PIF_VALUE: 14
PIF_VALUE: 14
PIF_VALUE: 15
PIF_VALUE: 14
PIF_VALUE: 16
PIF_VALUE: 14
PIF_VALUE: 15
PIF_VALUE: 14
PIF_VALUE: 14
PIF_VALUE: 12
PIF_VALUE: 14
PIF_VALUE: 12
PIF_VALUE: 12
PIF_VALUE: 2
PIF_VALUE: 15
PIF_VALUE: 13
PIF_VALUE: 15
PIF_VALUE: 14
PIF_VALUE: 15
PIF_VALUE: 13
PIF_VALUE: 15
PIF_VALUE: 16
PIF_VALUE: 13
PIF_VALUE: 14
PIF_VALUE: 14
PIF_VALUE: 16
PIF_VALUE: 2
PIF_VALUE: 17
PIF_VALUE: 12
PIF_VALUE: 14
PIF_VALUE: 16
PIF_VALUE: 12
PIF_VALUE: 12
PIF_VALUE: 14
PIF_VALUE: 14
PIF_VALUE: 13
PIF_VALUE: 14
PIF_VALUE: 13
PIF_VALUE: 13
PIF_VALUE: 15
PIF_VALUE: 12
PIF_VALUE: 12
PIF_VALUE: 15
PIF_VALUE: 14
PIF_VALUE: 13
PIF_VALUE: 14
PIF_VALUE: 13
PIF_VALUE: 16
PIF_VALUE: 14
PIF_VALUE: 12
PIF_VALUE: 16
PIF_VALUE: 12
PIF_VALUE: 12
PIF_VALUE: 16
PIF_VALUE: 14
PIF_VALUE: 14
PIF_VALUE: 13
PIF_VALUE: 11
PIF_VALUE: 13
PIF_VALUE: 13
PIF_VALUE: 16
PIF_VALUE: 13
PIF_VALUE: 15
PIF_VALUE: 14
PIF_VALUE: 14
PIF_VALUE: 13
PIF_VALUE: 12
PIF_VALUE: 15
PIF_VALUE: 13
PIF_VALUE: 16
PIF_VALUE: 14
PIF_VALUE: 14
PIF_VALUE: 13
PIF_VALUE: 13
PIF_VALUE: 14
PIF_VALUE: 12
PIF_VALUE: 16
PIF_VALUE: 15
PIF_VALUE: 15
PIF_VALUE: 14
PIF_VALUE: 13
PIF_VALUE: 16
PIF_VALUE: 13
PIF_VALUE: 13
PIF_VALUE: 12
PIF_VALUE: 14
PIF_VALUE: 13
PIF_VALUE: 14
PIF_VALUE: 12
PIF_VALUE: 14
PIF_VALUE: 13
PIF_VALUE: 14
PIF_VALUE: 16
PIF_VALUE: 14
PIF_VALUE: 17
PIF_VALUE: 12
PIF_VALUE: 15
PIF_VALUE: 15
PIF_VALUE: 13
PIF_VALUE: 14
PIF_VALUE: 13
PIF_VALUE: 12
PIF_VALUE: 12
PIF_VALUE: 14
PIF_VALUE: 12
PIF_VALUE: 14
PIF_VALUE: 13
PIF_VALUE: 11
PIF_VALUE: 15
PIF_VALUE: 12
PIF_VALUE: 14
PIF_VALUE: 12
PIF_VALUE: 12
PIF_VALUE: 14
PIF_VALUE: 16
PIF_VALUE: 12
PIF_VALUE: 14
PIF_VALUE: 15
PIF_VALUE: 13
PIF_VALUE: 14
PIF_VALUE: 14
PIF_VALUE: 12
PIF_VALUE: 15
PIF_VALUE: 15
PIF_VALUE: 16
PIF_VALUE: 14
PIF_VALUE: 14
PIF_VALUE: 16
PIF_VALUE: 13
PIF_VALUE: 14
PIF_VALUE: 11
PIF_VALUE: 14
PIF_VALUE: 13
PIF_VALUE: 13
PIF_VALUE: 15
PIF_VALUE: 15
PIF_VALUE: 13
PIF_VALUE: 15
PIF_VALUE: 15
PIF_VALUE: 13

## 2019-08-09 ASSESSMENT — PAIN SCALES - GENERAL
PAINLEVEL_OUTOF10: 8
PAINLEVEL_OUTOF10: 10
PAINLEVEL_OUTOF10: 8
PAINLEVEL_OUTOF10: 10
PAINLEVEL_OUTOF10: 8
PAINLEVEL_OUTOF10: 10
PAINLEVEL_OUTOF10: 8
PAINLEVEL_OUTOF10: 7

## 2019-08-09 ASSESSMENT — PAIN DESCRIPTION - PAIN TYPE
TYPE: ACUTE PAIN
TYPE: ACUTE PAIN
TYPE: SURGICAL PAIN

## 2019-08-09 ASSESSMENT — PAIN DESCRIPTION - LOCATION
LOCATION: ABDOMEN

## 2019-08-09 ASSESSMENT — ENCOUNTER SYMPTOMS: SHORTNESS OF BREATH: 0

## 2019-08-09 NOTE — CONSULTS
General Surgery Consult      Pt Name: Dom Okeefe  MRN: [id number]  YOB: 1975  Date of evaluation: 8/9/2019  Primary Care Physician: Gisell Eli March, DO   Patient evaluated at the request of  Dr. Maged Renteria  Reason for evaluation: Fecal impaction    SUBJECTIVE:   History of Chief Complaint:    Dom Okeefe is a 53 y.o. male with a history of cerebral palsy. He is a nursing home resident. He presents with altered mental status. States he has not properly moved his bowels in weeks. The patient was evaluated in the emergency department and CT scan was done showing significant enlargement of the distal colon with fecal impaction. The patient denies chest pain or shortness of breath    Past Medical History   has a past medical history of Cerebral palsy ([address] Cerebral palsy, athetoid ([address] Constipation, GERD (gastroesophageal reflux disease), Headache, Hydrocephalus, Movement disorder, Psychiatric problem, Small bowel obstruction due to adhesions ([address] and TIA (transient ischemic attack). Past Surgical History   has a past surgical history that includes Cholecystectomy; Eye surgery (Right); Testicle surgery (Left); and Urethra surgery. Medications  Prior to Admission medications    Medication Sig Start Date End Date Taking?  Authorizing Provider   lubiprostone (AMITIZA) 24 MCG capsule Take 24 mcg by mouth 2 times daily as needed for Constipation   Yes Historical Provider, MD   benztropine mesylate (COGENTIN) 1 MG/ML injection Infuse 2 mg intravenously daily as needed (Dystonia reaction, call MD after admin)   Yes Historical Provider, MD   diphenhydrAMINE (BENADRYL) 25 MG capsule Take 25 mg by mouth every 6 hours as needed for Itching (puritis)   Yes Historical Provider, MD   oxybutynin (DITROPAN-XL) 10 MG extended release tablet Take 10 mg by mouth daily   Yes Historical Provider, MD   ergocalciferol (ERGOCALCIFEROL) 21416 units capsule Take 50,000 Units by mouth once a week Wednesdays   Yes Historical Provider, MD   fentaNYL (DURAGESIC) 25 MCG/HR Place 1 patch onto the skin every 72 hours. Yes Historical Provider, MD   famotidine (PEPCID) 20 MG tablet Take 20 mg by mouth 2 times daily   Yes Historical Provider, MD   tamsulosin (FLOMAX) 0.4 MG capsule Take 0.4 mg by mouth daily   Yes Historical Provider, MD   gabapentin (NEURONTIN) 100 MG capsule Take 100 mg by mouth 2 times daily. Yes Historical Provider, MD   ibuprofen (ADVIL;MOTRIN) 800 MG tablet Take 800 mg by mouth every 8 hours as needed for Pain   Yes Historical Provider, MD   lamoTRIgine (LAMICTAL) 25 MG tablet Take 25 mg by mouth 2 times daily   Yes Historical Provider, MD   magnesium hydroxide (MILK OF MAGNESIA) 400 MG/5ML suspension Take 30 mLs by mouth daily as needed for Constipation   Yes Historical Provider, MD   HYDROcodone-acetaminophen (NORCO) 5-325 MG per tablet Take 1 tablet by mouth every 6 hours as needed for Pain. Yes Historical Provider, MD   omeprazole (PRILOSEC) 20 MG delayed release capsule Take 40 mg by mouth daily   Yes Historical Provider, MD   promethazine (PHENERGAN) 25 MG tablet Take 25 mg by mouth every 6 hours as needed for Nausea (vomiting)   Yes Historical Provider, MD   traZODone (DESYREL) 50 MG tablet Take 25 mg by mouth nightly as needed for Sleep   Yes Historical Provider, MD   vilazodone HCl (VILAZODONE HCL) 20 MG TABS Take 20 mg by mouth daily   Yes Historical Provider, MD   ferrous sulfate (TYRONE-INNA) 325 (65 FE) MG TABS Take 325 mg by mouth daily 7/7/15   Celia Catalan MD   polyethylene glycol [facility]) packet Take 17 g by mouth daily as needed    Historical Provider, MD   mineral oil enema Place 1 enema rectally once    Historical Provider, MD     Allergies  is allergic to pollen extract. Family History  family history includes COPD in his father; Diabetes in his mother. Social History   reports that he quit smoking about 2 months ago. His smoking use included cigarettes.  He has a 30.00

## 2019-08-09 NOTE — CONSULTS
[facility]  Urology Consultation    Patient:  Linette Simeon  MRN: [id number]  YOB: 1975    CHIEF COMPLAINT: Hematuria urinary retention    HISTORY OF PRESENT ILLNESS:   The patient is a 53 y.o. male who presents with diverticulitis, concerned about lower urinary tract symptoms dark urine colovesical fistula, we were asked by general surgery  to evaluate the patient. I have reviewed the CT scan, there is a thick bladder wall identified, there is also herniation of the bladder and inguinal hernia. No hydronephrosis no clinical evidence of fistula  The patient was admitted with abdominal pain as well as altered mental status, he has a known history of cerebral palsy    Patient's old records, notes and chart reviewed and summarized above.     Past Medical History:    Past Medical History:   Diagnosis Date    [address] [address]     Cerebral palsy, athetoid (HCC)     Constipation     GERD (gastroesophageal reflux disease)     Headache     Hydrocephalus     Movement disorder     Psychiatric problem     Small bowel obstruction due to adhesions (HCC)     TIA (transient ischemic attack)        Past Surgical History:    Past Surgical History:   Procedure Laterality Date    CHOLECYSTECTOMY      EYE SURGERY Right     lazy eye    TESTICLE SURGERY Left     URETHRA SURGERY       Previous  surgery: See previous record testicular surgery urethral surgery     Medications:    Scheduled Meds:   [Held by provider] vitamin D  50,000 Units Oral Weekly    [Held by provider] famotidine  20 mg Oral BID    [Held by provider] fentaNYL  1 patch Transdermal Q72H    [Held by provider] ferrous sulfate  325 mg Oral Daily    [Held by provider] gabapentin  100 mg Oral BID    [Held by provider] lamoTRIgine  25 mg Oral BID    [Held by provider] oxybutynin  10 mg Oral Daily    [Held by provider] tamsulosin  0.4 mg Oral Daily    [Held by provider] vilazodone HCl  20 mg Oral Daily    [facility] Hold] sodium chloride flush  10 mL Intravenous 2 times per day    [MAR Hold] enoxaparin  40 mg Subcutaneous Daily    [MAR Hold] cefTRIAXone (ROCEPHIN) IV  1 g Intravenous Q24H    [MAR Hold] pantoprazole  40 mg Intravenous BID    And    [MAR Hold] sodium chloride (PF)  10 mL Intravenous BID    [MAR Hold] insulin lispro  0-6 Units Subcutaneous TID WC    [MAR Hold] insulin lispro  0-3 Units Subcutaneous Nightly    [MAR Hold] nystatin   Topical BID    [MAR Hold] miconazole   Topical BID    [MAR Hold] metroNIDAZOLE  500 mg Intravenous Q8H     Continuous Infusions:   [MAR Hold] sodium chloride 125 mL/hr at 19 0207    sodium chloride      [MAR Hold] dextrose      [MAR Hold] norepinephrine       PRN Meds:.fentanNYL, fentanNYL, oxyCODONE-acetaminophen, ondansetron, sodium chloride, methylene blue in sodium chloride 0.9%, [MAR Hold] benztropine mesylate, [MAR Hold] polyethylene glycol, [MAR Hold] sodium chloride flush, [MAR Hold] nicotine, [MAR Hold] acetaminophen, [MAR Hold] ondansetron **OR** [MAR Hold] ondansetron, [MAR Hold] glucose, [MAR Hold] dextrose, [MAR Hold] glucagon (rDNA), [MAR Hold] dextrose, [MAR Hold] morphine **OR** [MAR Hold] morphine    Allergies:  Pollen extract    Social History:    Social History     Socioeconomic History    Marital status: Single     Spouse name: Not on file    Number of children: 0    Years of education: Not on file    Highest education level: Not on file   Occupational History    Not on file   Social Needs    Financial resource strain: Not on file    Food insecurity:     Worry: Not on file     Inability: Not on file    Transportation needs:     Medical: Not on file     Non-medical: Not on file   Tobacco Use    Smoking status: Former Smoker     Packs/day: 1.00     Years: 30.00     Pack years: 30.00     Types: Cigarettes     Last attempt to quit: 2019     Years since quittin.1    Smokeless tobacco: Never Used   Substance and Sexual Activity   

## 2019-08-09 NOTE — CONSULTS
pain, cough, shortness of breath. He does have nausea feeling. He also claims to have abdominal pain for 2 to 3 months. Patient has history of cerebral palsy.     PMHx   Past Medical History      Diagnosis Date    Cerebral palsy ([address]     Cerebral palsy, athetoid (HCC)     Constipation     GERD (gastroesophageal reflux disease)     Headache     Hydrocephalus     Movement disorder     Psychiatric problem     Small bowel obstruction due to adhesions (HCC)     TIA (transient ischemic attack)       Past Surgical History        Procedure Laterality Date    CHOLECYSTECTOMY      EYE SURGERY Right     lazy eye    TESTICLE SURGERY Left     URETHRA SURGERY         Meds    Current Medications    0.9 % sodium chloride  30 mL/kg Intravenous Once    [Held by provider] vitamin D  50,000 Units Oral Weekly    [Held by provider] famotidine  20 mg Oral BID    [Held by provider] fentaNYL  1 patch Transdermal Q72H    [Held by provider] ferrous sulfate  325 mg Oral Daily    [Held by provider] gabapentin  100 mg Oral BID    [Held by provider] lamoTRIgine  25 mg Oral BID    [Held by provider] oxybutynin  10 mg Oral Daily    [Held by provider] tamsulosin  0.4 mg Oral Daily    [Held by provider] vilazodone HCl  20 mg Oral Daily    sodium chloride flush  10 mL Intravenous 2 times per day    enoxaparin  40 mg Subcutaneous Daily    [START ON 8/9/2019] cefTRIAXone (ROCEPHIN) IV  1 g Intravenous Q24H    pantoprazole  40 mg Intravenous BID    And    sodium chloride (PF)  10 mL Intravenous BID    [START ON 8/9/2019] insulin lispro  0-6 Units Subcutaneous TID WC    insulin lispro  0-3 Units Subcutaneous Nightly    piperacillin-tazobactam  3.375 g Intravenous Q8H    sodium chloride  250 mL Intravenous Once    nystatin   Topical BID    miconazole   Topical BID    metroNIDAZOLE  500 mg Intravenous Q8H     benztropine mesylate, polyethylene glycol, sodium chloride flush, nicotine, acetaminophen, ondansetron **OR** ondansetron, glucose, dextrose, glucagon (rDNA), dextrose, morphine **OR** morphine  IV Drips/Infusions   dextrose      norepinephrine      sodium bicarbonate infusion 75 mL/hr at 08/08/19 2310     Home Medications  Medications Prior to Admission: lubiprostone (AMITIZA) 24 MCG capsule, Take 24 mcg by mouth 2 times daily as needed for Constipation  benztropine mesylate (COGENTIN) 1 MG/ML injection, Infuse 2 mg intravenously daily as needed (Dystonia reaction, call MD after admin)  diphenhydrAMINE (BENADRYL) 25 MG capsule, Take 25 mg by mouth every 6 hours as needed for Itching (puritis)  oxybutynin (DITROPAN-XL) 10 MG extended release tablet, Take 10 mg by mouth daily  ergocalciferol (ERGOCALCIFEROL) 72383 units capsule, Take 50,000 Units by mouth once a week Wednesdays  fentaNYL (DURAGESIC) 25 MCG/HR, Place 1 patch onto the skin every 72 hours. famotidine (PEPCID) 20 MG tablet, Take 20 mg by mouth 2 times daily  tamsulosin (FLOMAX) 0.4 MG capsule, Take 0.4 mg by mouth daily  gabapentin (NEURONTIN) 100 MG capsule, Take 100 mg by mouth 2 times daily. ibuprofen (ADVIL;MOTRIN) 800 MG tablet, Take 800 mg by mouth every 8 hours as needed for Pain  lamoTRIgine (LAMICTAL) 25 MG tablet, Take 25 mg by mouth 2 times daily  magnesium hydroxide (MILK OF MAGNESIA) 400 MG/5ML suspension, Take 30 mLs by mouth daily as needed for Constipation  HYDROcodone-acetaminophen (NORCO) 5-325 MG per tablet, Take 1 tablet by mouth every 6 hours as needed for Pain.   omeprazole (PRILOSEC) 20 MG delayed release capsule, Take 40 mg by mouth daily  promethazine (PHENERGAN) 25 MG tablet, Take 25 mg by mouth every 6 hours as needed for Nausea (vomiting)  traZODone (DESYREL) 50 MG tablet, Take 25 mg by mouth nightly as needed for Sleep  vilazodone HCl (VILAZODONE HCL) 20 MG TABS, Take 20 mg by mouth daily  ferrous sulfate (TYRONE-INNA) 325 (65 FE) MG TABS, Take 325 mg by mouth daily  polyethylene glycol (GLYCOLAX) packet, Take 17 g by mouth daily nonspecific tenderness with distention. Positive bowel sounds  Neurologic - CN II-XII are grossly intact. There are no focal motor or sensory deficits, history of cerebral palsy  Skin - No bruising or bleeding  Extremities - No cyanosis, clubbing or edema    Labs  - Old records and notes have been reviewed in [facility]   CBC     Lab Results   Component Value Date    WBC 22.3 08/08/2019    RBC 6.28 08/08/2019    HGB 18.9 08/08/2019    HCT 57.4 08/08/2019     08/08/2019    MCV 91.4 08/08/2019    MCH 30.1 08/08/2019    MCHC 32.9 08/08/2019    RDW 13.5 08/08/2019    LYMPHOPCT 3 08/08/2019    MONOPCT 9 08/08/2019    BASOPCT 0 08/08/2019    MONOSABS 2.01 08/08/2019    LYMPHSABS 0.67 08/08/2019    EOSABS 0.00 08/08/2019    BASOSABS 0.00 08/08/2019    DIFFTYPE NOT REPORTED 08/08/2019     BMP   Lab Results   Component Value Date     08/08/2019    K 4.9 08/08/2019     08/08/2019    CO2 13 08/08/2019    BUN 21 08/08/2019    CREATININE 1.09 08/08/2019    GLUCOSE 195 08/08/2019    CALCIUM 9.9 08/08/2019    MG 2.1 10/21/2016     LFTS  Lab Results   Component Value Date    ALKPHOS 130 08/08/2019    ALT 47 08/08/2019    AST 59 08/08/2019    PROT 7.8 08/08/2019    BILITOT 0.94 08/08/2019    BILIDIR <0.08 07/13/2016    IBILI CANNOT BE CALCULATED 07/13/2016    LABALBU 4.6 08/08/2019     ABG   Lab Results   Component Value Date    YXQ9IPB 20 08/08/2019     PTT  Lab Results   Component Value Date    APTT 28.0 08/08/2019     INR   Lab Results   Component Value Date    INR 1.0 08/08/2019    PROTIME 10.7 08/08/2019       Radiology    CT Scans  8/8/2019        (See actual reports for details)    \"Thank you for asking us to see this patient\"    Case discussed with nurse and patient. Questions and concerns addressed.     Electronically signed by     Kecia Wiseman MD on 8/8/2019 at 11:19 PM  Pulmonary Critical Care and Sleep Medicine,  [facility]  Cell: [phone]  Office: [phone]  CC: 65 minutes

## 2019-08-09 NOTE — CONSULTS
Provider   lubiprostone (AMITIZA) 24 MCG capsule Take 24 mcg by mouth 2 times daily as needed for Constipation   Yes Historical Provider, MD   benztropine mesylate (COGENTIN) 1 MG/ML injection Infuse 2 mg intravenously daily as needed (Dystonia reaction, call MD after admin)   Yes Historical Provider, MD   diphenhydrAMINE (BENADRYL) 25 MG capsule Take 25 mg by mouth every 6 hours as needed for Itching (puritis)   Yes Historical Provider, MD   oxybutynin (DITROPAN-XL) 10 MG extended release tablet Take 10 mg by mouth daily   Yes Historical Provider, MD   ergocalciferol (ERGOCALCIFEROL) 15027 units capsule Take 50,000 Units by mouth once a week Wednesdays   Yes Historical Provider, MD   fentaNYL (DURAGESIC) 25 MCG/HR Place 1 patch onto the skin every 72 hours. Yes Historical Provider, MD   famotidine (PEPCID) 20 MG tablet Take 20 mg by mouth 2 times daily   Yes Historical Provider, MD   tamsulosin (FLOMAX) 0.4 MG capsule Take 0.4 mg by mouth daily   Yes Historical Provider, MD   gabapentin (NEURONTIN) 100 MG capsule Take 100 mg by mouth 2 times daily. Yes Historical Provider, MD   ibuprofen (ADVIL;MOTRIN) 800 MG tablet Take 800 mg by mouth every 8 hours as needed for Pain   Yes Historical Provider, MD   lamoTRIgine (LAMICTAL) 25 MG tablet Take 25 mg by mouth 2 times daily   Yes Historical Provider, MD   magnesium hydroxide (MILK OF MAGNESIA) 400 MG/5ML suspension Take 30 mLs by mouth daily as needed for Constipation   Yes Historical Provider, MD   HYDROcodone-acetaminophen (NORCO) 5-325 MG per tablet Take 1 tablet by mouth every 6 hours as needed for Pain.    Yes Historical Provider, MD   omeprazole (PRILOSEC) 20 MG delayed release capsule Take 40 mg by mouth daily   Yes Historical Provider, MD   promethazine (PHENERGAN) 25 MG tablet Take 25 mg by mouth every 6 hours as needed for Nausea (vomiting)   Yes Historical Provider, MD   traZODone (DESYREL) 50 MG tablet Take 25 mg by mouth nightly as needed for Sleep   Yes

## 2019-08-09 NOTE — PROGRESS NOTES
Pulmonary Critical Care Progress Note  Hayde Bone MD     Patient seen for the follow up of  Sepsis [facility])     Subjective:  He denies chest pain or shortness of breath. Mild occasional cough, mostly dry. He does continue to report nausea. His blood pressures have been stable after fluid bolus, he has not required any vasopressors. Examination:  Vitals: /68   Pulse 121   Temp 98.6 °F (37 °C) (Infrared)   Resp 24   Ht 5' 5\" (1.651 m)   Wt 150 lb (68 kg)   SpO2 95%   BMI 24.96 kg/m²   General appearance: alert and cooperative with exam  Neck: No JVD  Lungs: moderate air exchange, no wheeze or rhonchi  Heart: regular rate and rhythm, S1, S2 normal, no gallop  Abdomen: Soft, non tender, + BS  Extremities: no cyanosis or clubbing.  No significant edema    LABs:  CBC:   Recent Labs     08/09/19  0009 08/09/19  0525   WBC 13.8* 12.0*   HGB 15.3 14.1   HCT 47.9 42.6    268     BMP:   Recent Labs     08/09/19  0009 08/09/19  0525    139   K 4.4 4.0   CO2 17* 20   BUN 25* 25*   CREATININE 0.61* 0.62*   LABGLOM >60 >60   GLUCOSE 118* 113*     PT/INR:   Recent Labs     08/08/19  1011 08/09/19  0525   PROTIME 10.7 10.7   INR 1.0 1.0     APTT:  Recent Labs     08/08/19  1011   APTT 28.0     LIVER PROFILE:  Recent Labs     08/08/19  1011   AST 59*   ALT 47*   LABALBU 4.6     ABG:  Lab Results   Component Value Date    RFA2IVB 20 08/08/2019    FIO2 21.0 08/08/2019       Lab Results   Component Value Date    POCPH 7.36 08/08/2019    POCPCO2 34 08/08/2019    POCPO2 74 08/08/2019    POCHCO3 18.8 08/08/2019    NBEA 7 08/08/2019    PBEA NOT REPORTED 08/08/2019    ECU0KCM 20 08/08/2019    BNHW9MOE 94 08/08/2019    FIO2 21.0 08/08/2019     Radiology:  8/8/19      Impression:  · Hypotension/dehydration  · Significant colon dilatation  · Right inguinal hernia  · Hiatal hernia  · Pneumonia/atelectasis  · Cerebral palsy/hydrocephalus  · History of smoking/emphysema  · Mildly elevated

## 2019-08-09 NOTE — ANESTHESIA PRE PROCEDURE
Department of Anesthesiology  Preprocedure Note       Name:  Darcy Malhotra   Age:  53 y.o.  :  1975                                          MRN:  [id number]         Date:  2019      Surgeon: Helen Singh):  Meliton Ramos DO    Procedure: BOWEL RESECTION SIGMOID COLECTOMY SUB TOTAL (N/A )    Medications prior to admission:   Prior to Admission medications    Medication Sig Start Date End Date Taking? Authorizing Provider   lubiprostone (AMITIZA) 24 MCG capsule Take 24 mcg by mouth 2 times daily as needed for Constipation   Yes Historical Provider, MD   benztropine mesylate (COGENTIN) 1 MG/ML injection Infuse 2 mg intravenously daily as needed (Dystonia reaction, call MD after admin)   Yes Historical Provider, MD   diphenhydrAMINE (BENADRYL) 25 MG capsule Take 25 mg by mouth every 6 hours as needed for Itching (puritis)   Yes Historical Provider, MD   oxybutynin (DITROPAN-XL) 10 MG extended release tablet Take 10 mg by mouth daily   Yes Historical Provider, MD   ergocalciferol (ERGOCALCIFEROL) 72175 units capsule Take 50,000 Units by mouth once a week    Yes Historical Provider, MD   fentaNYL (DURAGESIC) 25 MCG/HR Place 1 patch onto the skin every 72 hours. Yes Historical Provider, MD   famotidine (PEPCID) 20 MG tablet Take 20 mg by mouth 2 times daily   Yes Historical Provider, MD   tamsulosin (FLOMAX) 0.4 MG capsule Take 0.4 mg by mouth daily   Yes Historical Provider, MD   gabapentin (NEURONTIN) 100 MG capsule Take 100 mg by mouth 2 times daily.    Yes Historical Provider, MD   ibuprofen (ADVIL;MOTRIN) 800 MG tablet Take 800 mg by mouth every 8 hours as needed for Pain   Yes Historical Provider, MD   lamoTRIgine (LAMICTAL) 25 MG tablet Take 25 mg by mouth 2 times daily   Yes Historical Provider, MD   magnesium hydroxide (MILK OF MAGNESIA) 400 MG/5ML suspension Take 30 mLs by mouth daily as needed for Constipation   Yes Historical Provider, MD   HYDROcodone-acetaminophen ([facility]) 5-325 MG per 10 mg Oral Daily EPIFANIO Peacock - BC        Normie Kelch Hold] polyethylene glycol (GLYCOLAX) packet 17 g  17 g Oral Daily PRN EPIFANIO Peacock - NP        [Held by provider] tamsulosin (FLOMAX) capsule 0.4 mg  0.4 mg Oral Daily EPIFANIO Peacock - NP        [Held by provider] vilazodone HCl (VIIBRYD) TABS 20 mg  20 mg Oral Daily EPIFANIO Peacock - BC        Normie Kel Hold] sodium chloride flush 0.9 % injection 10 mL  10 mL Intravenous 2 times per day EPIFANIO Peacock - NP   10 mL at 08/09/19 0842    [MAR Hold] sodium chloride flush 0.9 % injection 10 mL  10 mL Intravenous PRN EPIFANIO Peacock - NP        [MAR Hold] nicotine (NICODERM CQ) 21 MG/24HR 1 patch  1 patch Transdermal Daily PRN EPIFANIO Peacock - NP        Normie Kel Hold] enoxaparin (LOVENOX) injection 40 mg  40 mg Subcutaneous Daily EPIFANIO Peacock - BC        Normie Kel Hold] acetaminophen (TYLENOL) tablet 650 mg  650 mg Oral Q4H PRN Kelli Liu, APRN - NP        Normie Kelch Hold] cefTRIAXone (ROCEPHIN) 1 g IVPB in 50 mL D5W minibag  1 g Intravenous Q24H EPIFANIO Peacock - NP   Stopped at 08/09/19 1329    [MAR Hold] ondansetron (ZOFRAN-ODT) disintegrating tablet 4 mg  4 mg Oral Q6H PRN EPIFANIO Peacock - BC        Or   Elly Lopez Hold] ondansetron (ZOFRAN) injection 4 mg  4 mg Intravenous Q6H PRN EPIFANIO Peacock - NP        Normie Kel Hold] pantoprazole (PROTONIX) injection 40 mg  40 mg Intravenous BID Holden Chapa APRN - CNP   40 mg at 08/09/19 0844    And    [MAR Hold] sodium chloride (PF) 0.9 % injection 10 mL  10 mL Intravenous BID Holden Chapa, APRN - CNP   10 mL at 08/09/19 0248    [MAR Hold] insulin lispro (HUMALOG) injection vial 0-6 Units  0-6 Units Subcutaneous TID WC Holden Chapa, APRN - PAUL Henderson Hold] insulin lispro (HUMALOG) injection vial 0-3 Units  0-3 Units Subcutaneous Nightly EPIFANIO Sepulveda CNP        [MAR Hold] glucose (GLUTOSE) 40 % oral gel 15 g  15 g Oral PRN Holden Chapa, APRN - CNP Psychiatric problem     Small bowel obstruction due to adhesions (HCC)     TIA (transient ischemic attack)        Past Surgical History:        Procedure Laterality Date    CHOLECYSTECTOMY      EYE SURGERY Right     lazy eye    TESTICLE SURGERY Left     URETHRA SURGERY         Social History:    Social History     Tobacco Use    Smoking status: Former Smoker     Packs/day: 1.00     Years: 30.00     Pack years: 30.00     Types: Cigarettes     Last attempt to quit: 2019     Years since quittin.1    Smokeless tobacco: Never Used   Substance Use Topics    Alcohol use: No     Alcohol/week: 0.0 standard drinks                                Counseling given: Not Answered      Vital Signs (Current):   Vitals:    19 0900 19 1000 19 1100 19 1200   BP: 118/78 91/62 113/84 92/76   Pulse: 125 125 135 109   Resp: 18 15 21 21   Temp:    98.3 °F (36.8 °C)   TempSrc:    Infrared   SpO2:       Weight:       Height:                                                  BP Readings from Last 3 Encounters:   19 92/76   19 (!) 106/51       NPO Status:                                                                                 BMI:   Wt Readings from Last 3 Encounters:   19 150 lb (68 kg)     Body mass index is 24.96 kg/m².     CBC:   Lab Results   Component Value Date    WBC 12.0 2019    RBC 4.66 2019    HGB 14.1 2019    HCT 42.6 2019    MCV 91.4 2019    RDW 13.7 2019     2019       CMP:   Lab Results   Component Value Date     2019    K 4.0 2019     2019    CO2 20 2019    BUN 25 2019    CREATININE 0.62 2019    GFRAA >60 2019    LABGLOM >60 2019    GLUCOSE 113 2019    PROT 7.8 2019    CALCIUM 8.0 2019    BILITOT 0.94 2019    ALKPHOS 130 2019    AST 59 2019    ALT 47 2019       POC Tests:   Recent Labs     19  1200   POCGLU 97 Coags:   Lab Results   Component Value Date    PROTIME 10.7 08/09/2019    INR 1.0 08/09/2019    APTT 28.0 08/08/2019       HCG (If Applicable): No results found for: PREGTESTUR, PREGSERUM, HCG, HCGQUANT     ABGs: No results found for: PHART, PO2ART, EOB3KTT, DND7TXJ, BEART, J7LLETHM     Type & Screen (If Applicable):  No results found for: LABABO, LABRH    Anesthesia Evaluation    Airway: Mallampati: II  TM distance: >3 FB   Neck ROM: limited  Mouth opening: > = 3 FB Dental:          Pulmonary:       (-) shortness of breath                           Cardiovascular:        (-)  angina      Rhythm: regular                      Neuro/Psych:      (-) headaches            ROS comment: Appropriate  Denies HA GI/Hepatic/Renal:             Endo/Other:                     Abdominal:           Vascular:                                        Anesthesia Plan      general     ASA 4             Anesthetic plan and risks discussed with patient.                       Lonnie Kerr MD   8/9/2019

## 2019-08-09 NOTE — PROGRESS NOTES
nitrates, and the CT of the abdomen reports markedly dilated rectosigmoid colon with mild dilatation of the remaining colon, there is a large amount of stool within the rectosigmoid colon with more focally higher density area within the rectum likely more focal stool impaction rather than a mass however an obstruction could not be ruled out. CT of the chest reports left lower lobe collapse with resultant elevation of the left hemidiaphragm and extension of the stomach and left colon in the thoracic cavity with a large amount of stool. Code sepsis was initiated and the patient was given IV fluids, blood cultures were obtained and antibiotics were started. The patient had an NG tube placed in the emergency room prior to coming to the ICU and had 600 mL's of black fluid returned. In the ER the had a large black loose stool and at least 3 more during my assessment. Dr. Anne Murray with critical care did come in per my request to evaluate the patient to discuss treatment plan. Dr. Yulissa Lopez and Dr. Anne Murray spoke on the phone, they decided to do a lactic acid and a KUB to help better determine whether the patient needs to go to the OR tonight or not. His lactic was 1.1 and his KUB showed no obstruction just constipation. At this point emergent surgery does not seem to be indicated and we will continue to monitor patient closely. Review of Systems:     Constitutional:  negative for chills, fevers, sweats  Respiratory:  negative for cough, dyspnea on exertion, shortness of breath, wheezing  Cardiovascular:  negative for chest pain, chest pressure/discomfort, lower extremity edema, palpitations  Gastrointestinal:  + for abdominal pain, constipation,  nausea, vomiting  Neurological:  negative for dizziness, headache    Medications: Allergies:     Allergies   Allergen Reactions    Pollen Extract        Current Meds:   Scheduled Meds:    [Held by provider] vitamin D  50,000 Units Oral Weekly    [Held by provider]

## 2019-08-09 NOTE — FLOWSHEET NOTE
08/08/19 1957   Provider Notification   Reason for Communication Evaluate  (new consult; sepsis)   Provider Name Dr. Sammy Walters   Provider Notification Physician   Method of Communication Call   Response See orders   Notification Time 1957     Zosyn 3.375 q8hr and include dose now, insert PICC line, 250mL fluid bolus, consult to Dr. Osorio Friday surgery, ABG, Levophed ordered precautionary for hypotension d/t sepsis.

## 2019-08-09 NOTE — ANESTHESIA POSTPROCEDURE EVALUATION
Department of Anesthesiology  Postprocedure Note    Patient: Michaeleen Najjar  MRN: [id number]  YOB: 1975  Date of evaluation: 8/9/2019  Time:  5:51 PM     Procedure Summary     Date:  08/09/19 Room / Location:  [facility] OR 05 / [facility]    Anesthesia Start:  7181 Anesthesia Stop:  1128    Procedure:  EXPLORATORY LAPAROTOMY, LEFT COLECTOMY WITH COLOSTOMY AND APPENDECTOMY (N/A ) Diagnosis:  (sepsis)    Surgeon:  Katty Amador DO Responsible Provider:  Donna Abdul MD    Anesthesia Type:  general ASA Status:  4          Anesthesia Type: No value filed. Isa Phase I:      Isa Phase II:      Last vitals: Reviewed and per EMR flowsheets.        Anesthesia Post Evaluation    Complications: no

## 2019-08-09 NOTE — BRIEF OP NOTE
8/9/2019  8:00 AM   NG/OG/NJ/NE External Measurement (cm) 68 cm 8/9/2019  8:00 AM   Drainage Appearance Other (Comment) 8/9/2019  8:00 AM       Findings: massive distension of rectosigmoid and descending colon.  Redundant transverse colon        Nehemiah Perkins DO  Date: 8/9/2019  Time: 5:34 PM

## 2019-08-10 LAB
-: NORMAL
ABSOLUTE EOS #: 0 K/UL (ref 0–0.4)
ABSOLUTE IMMATURE GRANULOCYTE: 0 K/UL (ref 0–0.3)
ABSOLUTE LYMPH #: 1.04 K/UL (ref 1–4.8)
ABSOLUTE MONO #: 0.88 K/UL (ref 0.2–0.8)
ANION GAP SERPL CALCULATED.3IONS-SCNC: 12 MMOL/L (ref 9–17)
ATYPICAL LYMPHOCYTE ABSOLUTE COUNT: 0.08 K/UL
ATYPICAL LYMPHOCYTES: 1 %
BASOPHILS # BLD: 0 %
BASOPHILS ABSOLUTE: 0 K/UL (ref 0–0.2)
BUN BLDV-MCNC: 12 MG/DL (ref 6–20)
BUN/CREAT BLD: 24 (ref 9–20)
CALCIUM SERPL-MCNC: 7.7 MG/DL (ref 8.6–10.4)
CHLORIDE BLD-SCNC: 106 MMOL/L (ref 98–107)
CO2: 19 MMOL/L (ref 20–31)
CREAT SERPL-MCNC: 0.5 MG/DL (ref 0.7–1.2)
DIFFERENTIAL TYPE: ABNORMAL
EOSINOPHILS RELATIVE PERCENT: 0 % (ref 1–4)
GFR AFRICAN AMERICAN: >60 ML/MIN
GFR NON-AFRICAN AMERICAN: >60 ML/MIN
GFR SERPL CREATININE-BSD FRML MDRD: ABNORMAL ML/MIN/{1.73_M2}
GFR SERPL CREATININE-BSD FRML MDRD: ABNORMAL ML/MIN/{1.73_M2}
GLUCOSE BLD-MCNC: 109 MG/DL (ref 70–99)
GLUCOSE BLD-MCNC: 91 MG/DL (ref 75–110)
GLUCOSE BLD-MCNC: 94 MG/DL (ref 75–110)
HCT VFR BLD CALC: 36.7 % (ref 40.7–50.3)
HEMOGLOBIN: 12 G/DL (ref 13–17)
IMMATURE GRANULOCYTES: 0 %
LYMPHOCYTES # BLD: 13 % (ref 24–44)
MAGNESIUM: 1.8 MG/DL (ref 1.6–2.6)
MCH RBC QN AUTO: 30 PG (ref 25.2–33.5)
MCHC RBC AUTO-ENTMCNC: 32.7 G/DL (ref 28.4–34.8)
MCV RBC AUTO: 91.8 FL (ref 82.6–102.9)
MONOCYTES # BLD: 11 % (ref 1–7)
MORPHOLOGY: ABNORMAL
NRBC AUTOMATED: 0 PER 100 WBC
PDW BLD-RTO: 13.5 % (ref 11.8–14.4)
PHOSPHORUS: 2.5 MG/DL (ref 2.5–4.5)
PLATELET # BLD: 251 K/UL (ref 138–453)
PLATELET ESTIMATE: ABNORMAL
PMV BLD AUTO: 9.3 FL (ref 8.1–13.5)
POTASSIUM SERPL-SCNC: 3.9 MMOL/L (ref 3.7–5.3)
RBC # BLD: 4 M/UL (ref 4.21–5.77)
RBC # BLD: ABNORMAL 10*6/UL
REASON FOR REJECTION: NORMAL
SEG NEUTROPHILS: 75 % (ref 36–66)
SEGMENTED NEUTROPHILS ABSOLUTE COUNT: 6 K/UL (ref 1.8–7.7)
SODIUM BLD-SCNC: 137 MMOL/L (ref 135–144)
WBC # BLD: 8 K/UL (ref 3.5–11.3)
WBC # BLD: ABNORMAL 10*3/UL
ZZ NTE CLEAN UP: ORDERED TEST: NORMAL
ZZ NTE WITH NAME CLEAN UP: SPECIMEN SOURCE: NORMAL

## 2019-08-10 PROCEDURE — 2000000000 HC ICU R&B

## 2019-08-10 PROCEDURE — 85025 COMPLETE CBC W/AUTO DIFF WBC: CPT

## 2019-08-10 PROCEDURE — 2500000003 HC RX 250 WO HCPCS: Performed by: SURGERY

## 2019-08-10 PROCEDURE — 82947 ASSAY GLUCOSE BLOOD QUANT: CPT

## 2019-08-10 PROCEDURE — 99232 SBSQ HOSP IP/OBS MODERATE 35: CPT | Performed by: INTERNAL MEDICINE

## 2019-08-10 PROCEDURE — 36415 COLL VENOUS BLD VENIPUNCTURE: CPT

## 2019-08-10 PROCEDURE — 6360000002 HC RX W HCPCS: Performed by: SURGERY

## 2019-08-10 PROCEDURE — 6370000000 HC RX 637 (ALT 250 FOR IP): Performed by: SURGERY

## 2019-08-10 PROCEDURE — 84100 ASSAY OF PHOSPHORUS: CPT

## 2019-08-10 PROCEDURE — APPNB30 APP NON BILLABLE TIME 0-30 MINS: Performed by: NURSE PRACTITIONER

## 2019-08-10 PROCEDURE — 83735 ASSAY OF MAGNESIUM: CPT

## 2019-08-10 PROCEDURE — 2580000003 HC RX 258: Performed by: SURGERY

## 2019-08-10 PROCEDURE — 80048 BASIC METABOLIC PNL TOTAL CA: CPT

## 2019-08-10 RX ADMIN — GABAPENTIN 100 MG: 100 CAPSULE ORAL at 20:14

## 2019-08-10 RX ADMIN — MORPHINE SULFATE 4 MG: 4 INJECTION INTRAVENOUS at 03:10

## 2019-08-10 RX ADMIN — PANTOPRAZOLE SODIUM 40 MG: 40 TABLET, DELAYED RELEASE ORAL at 08:31

## 2019-08-10 RX ADMIN — SODIUM CHLORIDE, POTASSIUM CHLORIDE, SODIUM LACTATE AND CALCIUM CHLORIDE: 600; 310; 30; 20 INJECTION, SOLUTION INTRAVENOUS at 19:55

## 2019-08-10 RX ADMIN — NYSTATIN: 100000 CREAM TOPICAL at 08:31

## 2019-08-10 RX ADMIN — METRONIDAZOLE 500 MG: 500 INJECTION, SOLUTION INTRAVENOUS at 14:50

## 2019-08-10 RX ADMIN — LAMOTRIGINE 25 MG: 25 TABLET ORAL at 08:30

## 2019-08-10 RX ADMIN — MORPHINE SULFATE 4 MG: 4 INJECTION INTRAVENOUS at 21:55

## 2019-08-10 RX ADMIN — Medication 10 ML: at 20:15

## 2019-08-10 RX ADMIN — LAMOTRIGINE 25 MG: 25 TABLET ORAL at 20:14

## 2019-08-10 RX ADMIN — Medication 10 ML: at 08:33

## 2019-08-10 RX ADMIN — SODIUM CHLORIDE, POTASSIUM CHLORIDE, SODIUM LACTATE AND CALCIUM CHLORIDE: 600; 310; 30; 20 INJECTION, SOLUTION INTRAVENOUS at 10:58

## 2019-08-10 RX ADMIN — MORPHINE SULFATE 2 MG: 2 INJECTION, SOLUTION INTRAMUSCULAR; INTRAVENOUS at 19:55

## 2019-08-10 RX ADMIN — CEFTRIAXONE SODIUM 1 G: 1 INJECTION, POWDER, FOR SOLUTION INTRAMUSCULAR; INTRAVENOUS at 12:07

## 2019-08-10 RX ADMIN — VILAZODONE HYDROCHLORIDE 20 MG: 20 TABLET ORAL at 08:39

## 2019-08-10 RX ADMIN — ANTI-FUNGAL POWDER MICONAZOLE NITRATE TALC FREE: 1.42 POWDER TOPICAL at 20:14

## 2019-08-10 RX ADMIN — MORPHINE SULFATE 2 MG: 2 INJECTION, SOLUTION INTRAMUSCULAR; INTRAVENOUS at 00:54

## 2019-08-10 RX ADMIN — OXYBUTYNIN CHLORIDE 10 MG: 5 TABLET, EXTENDED RELEASE ORAL at 08:30

## 2019-08-10 RX ADMIN — METRONIDAZOLE 500 MG: 500 INJECTION, SOLUTION INTRAVENOUS at 06:05

## 2019-08-10 RX ADMIN — MORPHINE SULFATE 2 MG: 2 INJECTION, SOLUTION INTRAMUSCULAR; INTRAVENOUS at 08:39

## 2019-08-10 RX ADMIN — SODIUM CHLORIDE, POTASSIUM CHLORIDE, SODIUM LACTATE AND CALCIUM CHLORIDE: 600; 310; 30; 20 INJECTION, SOLUTION INTRAVENOUS at 03:10

## 2019-08-10 RX ADMIN — ONDANSETRON 4 MG: 2 INJECTION INTRAMUSCULAR; INTRAVENOUS at 12:07

## 2019-08-10 RX ADMIN — MORPHINE SULFATE 2 MG: 2 INJECTION, SOLUTION INTRAMUSCULAR; INTRAVENOUS at 10:58

## 2019-08-10 RX ADMIN — TAMSULOSIN HYDROCHLORIDE 0.4 MG: 0.4 CAPSULE ORAL at 08:31

## 2019-08-10 RX ADMIN — METRONIDAZOLE 500 MG: 500 INJECTION, SOLUTION INTRAVENOUS at 22:38

## 2019-08-10 RX ADMIN — NYSTATIN: 100000 CREAM TOPICAL at 20:14

## 2019-08-10 RX ADMIN — ANTI-FUNGAL POWDER MICONAZOLE NITRATE TALC FREE: 1.42 POWDER TOPICAL at 08:32

## 2019-08-10 RX ADMIN — GABAPENTIN 100 MG: 100 CAPSULE ORAL at 08:31

## 2019-08-10 ASSESSMENT — PAIN DESCRIPTION - PAIN TYPE
TYPE: SURGICAL PAIN

## 2019-08-10 ASSESSMENT — PAIN DESCRIPTION - LOCATION
LOCATION: ABDOMEN

## 2019-08-10 ASSESSMENT — PAIN SCALES - GENERAL
PAINLEVEL_OUTOF10: 7
PAINLEVEL_OUTOF10: 5
PAINLEVEL_OUTOF10: 9
PAINLEVEL_OUTOF10: 9
PAINLEVEL_OUTOF10: 10
PAINLEVEL_OUTOF10: 9
PAINLEVEL_OUTOF10: 9
PAINLEVEL_OUTOF10: 8

## 2019-08-10 ASSESSMENT — PAIN DESCRIPTION - FREQUENCY: FREQUENCY: CONTINUOUS

## 2019-08-10 ASSESSMENT — PAIN DESCRIPTION - ONSET: ONSET: ON-GOING

## 2019-08-10 ASSESSMENT — PAIN DESCRIPTION - ORIENTATION: ORIENTATION: LOWER

## 2019-08-10 ASSESSMENT — PAIN DESCRIPTION - DESCRIPTORS: DESCRIPTORS: CRAMPING;DISCOMFORT;DULL

## 2019-08-10 NOTE — PROGRESS NOTES
[facility]    Gastroenterology Daily Progress Note      Patient:   Kenisha Macario   :    1975   Facility:   [facility]   Date:     8/10/2019  Consultant:   Johan Charles CNP      SUBJECTIVE  53 y.o. male admitted 2019 with Urinary tract infection [N39.0]  Sepsis ([facility] [address] [A41.9] and seen for abdominal pain. The pt was seen and examined. He is s/p exploratory laparotomy with left colectomy with colostomy and appendectomy yesterday. He reports incisional pain; no stool from the colostomy per nursing. He is currently agitated and is refusing to have his abdomen assessed.          OBJECTIVE  Scheduled Meds:   [START ON 2019] fentaNYL  1 patch Transdermal Q72H    ferrous sulfate  325 mg Oral Daily    gabapentin  100 mg Oral BID    lamoTRIgine  25 mg Oral BID    oxybutynin  10 mg Oral Daily    tamsulosin  0.4 mg Oral Daily    vilazodone HCl  20 mg Oral Daily    [START ON 8/15/2019] vitamin D  50,000 Units Oral Weekly    sodium chloride flush  10 mL Intravenous 2 times per day    pantoprazole  40 mg Oral Daily    cefTRIAXone (ROCEPHIN) IV  1 g Intravenous Q24H    insulin lispro  0-6 Units Subcutaneous TID WC    insulin lispro  0-3 Units Subcutaneous Nightly    nystatin   Topical BID    miconazole   Topical BID    metroNIDAZOLE  500 mg Intravenous Q8H       Vital Signs:  /71   Pulse 121   Temp 98.6 °F (37 °C)   Resp 15   Ht 5' 5\" (1.651 m)   Wt 150 lb (68 kg)   SpO2 98%   BMI 24.96 kg/m²      Physical Exam:   General appearance: Alert & oriented, agitated, NAD  Lungs: CTA bilaterally    Heart: S1S2 RRR tachycardic  Abdomen: refused to have abdomen assessed, RN and Dr Ranjana Wei notifed  Skin: No jaundice, No clubbing, No cyanosis contractures present    Lab and Imaging Review     CBC  Recent Labs     19  0009 19  0525 08/10/19  0439   WBC 13.8* 12.0* 8.0   HGB 15.3 14.1 12.0*   HCT 47.9 42.6 36.7*   MCV 93.7 91.4 91.8   PLT hip   joint likely chronic subluxation. No acute osseous abnormality.  Age   indeterminate compression deformity of L1.           Impression   1. Markedly dilated rectosigmoid colon with mild dilation of the remaining   colon.  Portions of the stomach and colon are excluded on this exam as they   are intrathoracic in location and due to field of view. Kendra Ran is a large   amount of stool within the rectosigmoid colon with more focal high density   area within the rectum likely more focal stool impaction rather than a mass,   however, findings can be correlated with colonoscopy once cleared.  An   obvious colonic obstruction cannot be entirely excluded but is otherwise   difficult to evaluate on this exam.  No small bowel dilation. 2. Type 4 hiatal hernia containing majority of the stomach as well as   pancreas. 3. Mild wall thickening of the bladder despite decompression with a Martinez   catheter.  Recommend correlation with urinalysis. 4. Small left inguinal hernia containing bladder. 5. Age-indeterminate compression deformity of L1.                 ASSESSMENT/PLAN:  1. Abdominal pain s/p exploratory lab with appendectomy, and left colectomy with colostomy  for colonic impaction with sepsis yesterday  -antibiotics and diet per surgery  -pain mgt per primary service  -case was discussed with Dr Demetrice Garcia; GI will sign off at this time, please reconsult if needed          This plan was formulated in collaboration with Rhonda Short .     Electronically signed by: EPIFANIO Jenkins CNP on 8/10/2019 at 12:20 PM

## 2019-08-11 LAB
ABSOLUTE EOS #: 0.09 K/UL (ref 0–0.44)
ABSOLUTE IMMATURE GRANULOCYTE: 0.05 K/UL (ref 0–0.3)
ABSOLUTE LYMPH #: 2.11 K/UL (ref 1.1–3.7)
ABSOLUTE MONO #: 1.27 K/UL (ref 0.1–1.2)
ALBUMIN SERPL-MCNC: 2.2 G/DL (ref 3.5–5.2)
ALBUMIN/GLOBULIN RATIO: ABNORMAL (ref 1–2.5)
ALP BLD-CCNC: 59 U/L (ref 40–129)
ALT SERPL-CCNC: 20 U/L (ref 5–41)
ANION GAP SERPL CALCULATED.3IONS-SCNC: 10 MMOL/L (ref 9–17)
AST SERPL-CCNC: 43 U/L
BASOPHILS # BLD: 0 % (ref 0–2)
BASOPHILS ABSOLUTE: <0.03 K/UL (ref 0–0.2)
BILIRUB SERPL-MCNC: 0.35 MG/DL (ref 0.3–1.2)
BUN BLDV-MCNC: 7 MG/DL (ref 6–20)
BUN/CREAT BLD: 14 (ref 9–20)
CALCIUM IONIZED: 1.17 MMOL/L (ref 1.13–1.33)
CALCIUM SERPL-MCNC: 7.7 MG/DL (ref 8.6–10.4)
CHLORIDE BLD-SCNC: 105 MMOL/L (ref 98–107)
CO2: 24 MMOL/L (ref 20–31)
CREAT SERPL-MCNC: 0.5 MG/DL (ref 0.7–1.2)
DIFFERENTIAL TYPE: ABNORMAL
EOSINOPHILS RELATIVE PERCENT: 1 % (ref 1–4)
GFR AFRICAN AMERICAN: >60 ML/MIN
GFR NON-AFRICAN AMERICAN: >60 ML/MIN
GFR SERPL CREATININE-BSD FRML MDRD: ABNORMAL ML/MIN/{1.73_M2}
GFR SERPL CREATININE-BSD FRML MDRD: ABNORMAL ML/MIN/{1.73_M2}
GLUCOSE BLD-MCNC: 105 MG/DL (ref 75–110)
GLUCOSE BLD-MCNC: 88 MG/DL (ref 70–99)
HCT VFR BLD CALC: 34.6 % (ref 40.7–50.3)
HEMOGLOBIN: 11.4 G/DL (ref 13–17)
IMMATURE GRANULOCYTES: 1 %
LYMPHOCYTES # BLD: 22 % (ref 24–43)
MCH RBC QN AUTO: 30.2 PG (ref 25.2–33.5)
MCHC RBC AUTO-ENTMCNC: 32.9 G/DL (ref 28.4–34.8)
MCV RBC AUTO: 91.5 FL (ref 82.6–102.9)
MONOCYTES # BLD: 13 % (ref 3–12)
NRBC AUTOMATED: 0 PER 100 WBC
PDW BLD-RTO: 13.3 % (ref 11.8–14.4)
PLATELET # BLD: 201 K/UL (ref 138–453)
PLATELET ESTIMATE: ABNORMAL
PMV BLD AUTO: 8.7 FL (ref 8.1–13.5)
POTASSIUM SERPL-SCNC: 3.3 MMOL/L (ref 3.7–5.3)
RBC # BLD: 3.78 M/UL (ref 4.21–5.77)
RBC # BLD: ABNORMAL 10*6/UL
SEG NEUTROPHILS: 63 % (ref 36–65)
SEGMENTED NEUTROPHILS ABSOLUTE COUNT: 5.91 K/UL (ref 1.5–8.1)
SODIUM BLD-SCNC: 139 MMOL/L (ref 135–144)
TOTAL PROTEIN: 4.2 G/DL (ref 6.4–8.3)
WBC # BLD: 9.5 K/UL (ref 3.5–11.3)
WBC # BLD: ABNORMAL 10*3/UL

## 2019-08-11 PROCEDURE — 6360000002 HC RX W HCPCS: Performed by: SURGERY

## 2019-08-11 PROCEDURE — 2580000003 HC RX 258: Performed by: SURGERY

## 2019-08-11 PROCEDURE — 2500000003 HC RX 250 WO HCPCS: Performed by: SURGERY

## 2019-08-11 PROCEDURE — 82330 ASSAY OF CALCIUM: CPT

## 2019-08-11 PROCEDURE — 99232 SBSQ HOSP IP/OBS MODERATE 35: CPT | Performed by: INTERNAL MEDICINE

## 2019-08-11 PROCEDURE — 36415 COLL VENOUS BLD VENIPUNCTURE: CPT

## 2019-08-11 PROCEDURE — 6360000002 HC RX W HCPCS: Performed by: INTERNAL MEDICINE

## 2019-08-11 PROCEDURE — 80053 COMPREHEN METABOLIC PANEL: CPT

## 2019-08-11 PROCEDURE — 2000000000 HC ICU R&B

## 2019-08-11 PROCEDURE — 6370000000 HC RX 637 (ALT 250 FOR IP): Performed by: SURGERY

## 2019-08-11 PROCEDURE — 85025 COMPLETE CBC W/AUTO DIFF WBC: CPT

## 2019-08-11 PROCEDURE — 6370000000 HC RX 637 (ALT 250 FOR IP): Performed by: INTERNAL MEDICINE

## 2019-08-11 RX ORDER — POLYETHYLENE GLYCOL 3350 17 G/17G
17 POWDER, FOR SOLUTION ORAL DAILY
Status: DISCONTINUED | OUTPATIENT
Start: 2019-08-11 | End: 2019-08-21 | Stop reason: HOSPADM

## 2019-08-11 RX ORDER — POTASSIUM CHLORIDE 20 MEQ/1
40 TABLET, EXTENDED RELEASE ORAL PRN
Status: DISCONTINUED | OUTPATIENT
Start: 2019-08-11 | End: 2019-08-14 | Stop reason: SDUPTHER

## 2019-08-11 RX ORDER — POTASSIUM CHLORIDE 7.45 MG/ML
10 INJECTION INTRAVENOUS PRN
Status: DISCONTINUED | OUTPATIENT
Start: 2019-08-11 | End: 2019-08-14

## 2019-08-11 RX ORDER — KETOROLAC TROMETHAMINE 15 MG/ML
15 INJECTION, SOLUTION INTRAMUSCULAR; INTRAVENOUS EVERY 6 HOURS PRN
Status: DISPENSED | OUTPATIENT
Start: 2019-08-11 | End: 2019-08-12

## 2019-08-11 RX ADMIN — Medication 10 ML: at 08:23

## 2019-08-11 RX ADMIN — ONDANSETRON 4 MG: 2 INJECTION INTRAMUSCULAR; INTRAVENOUS at 17:05

## 2019-08-11 RX ADMIN — METRONIDAZOLE 500 MG: 500 INJECTION, SOLUTION INTRAVENOUS at 22:54

## 2019-08-11 RX ADMIN — ONDANSETRON 4 MG: 2 INJECTION INTRAMUSCULAR; INTRAVENOUS at 21:14

## 2019-08-11 RX ADMIN — METRONIDAZOLE 500 MG: 500 INJECTION, SOLUTION INTRAVENOUS at 05:55

## 2019-08-11 RX ADMIN — KETOROLAC TROMETHAMINE 15 MG: 15 INJECTION, SOLUTION INTRAMUSCULAR; INTRAVENOUS at 11:49

## 2019-08-11 RX ADMIN — POTASSIUM CHLORIDE 40 MEQ: 20 TABLET, EXTENDED RELEASE ORAL at 11:49

## 2019-08-11 RX ADMIN — ANTI-FUNGAL POWDER MICONAZOLE NITRATE TALC FREE: 1.42 POWDER TOPICAL at 21:20

## 2019-08-11 RX ADMIN — TAMSULOSIN HYDROCHLORIDE 0.4 MG: 0.4 CAPSULE ORAL at 08:23

## 2019-08-11 RX ADMIN — SODIUM CHLORIDE, POTASSIUM CHLORIDE, SODIUM LACTATE AND CALCIUM CHLORIDE: 600; 310; 30; 20 INJECTION, SOLUTION INTRAVENOUS at 05:55

## 2019-08-11 RX ADMIN — ONDANSETRON 4 MG: 2 INJECTION INTRAMUSCULAR; INTRAVENOUS at 08:23

## 2019-08-11 RX ADMIN — SODIUM CHLORIDE, POTASSIUM CHLORIDE, SODIUM LACTATE AND CALCIUM CHLORIDE: 600; 310; 30; 20 INJECTION, SOLUTION INTRAVENOUS at 19:41

## 2019-08-11 RX ADMIN — PANTOPRAZOLE SODIUM 40 MG: 40 TABLET, DELAYED RELEASE ORAL at 08:24

## 2019-08-11 RX ADMIN — NYSTATIN: 100000 CREAM TOPICAL at 08:31

## 2019-08-11 RX ADMIN — CEFTRIAXONE SODIUM 1 G: 1 INJECTION, POWDER, FOR SOLUTION INTRAMUSCULAR; INTRAVENOUS at 13:05

## 2019-08-11 RX ADMIN — MORPHINE SULFATE 4 MG: 4 INJECTION INTRAVENOUS at 01:09

## 2019-08-11 RX ADMIN — FERROUS SULFATE TAB EC 325 MG (65 MG FE EQUIVALENT) 325 MG: 325 (65 FE) TABLET DELAYED RESPONSE at 08:23

## 2019-08-11 RX ADMIN — ANTI-FUNGAL POWDER MICONAZOLE NITRATE TALC FREE: 1.42 POWDER TOPICAL at 08:31

## 2019-08-11 RX ADMIN — NYSTATIN: 100000 CREAM TOPICAL at 21:20

## 2019-08-11 RX ADMIN — MORPHINE SULFATE 4 MG: 4 INJECTION INTRAVENOUS at 17:05

## 2019-08-11 RX ADMIN — OXYBUTYNIN CHLORIDE 10 MG: 5 TABLET, EXTENDED RELEASE ORAL at 08:23

## 2019-08-11 RX ADMIN — MORPHINE SULFATE 4 MG: 4 INJECTION INTRAVENOUS at 03:59

## 2019-08-11 RX ADMIN — MORPHINE SULFATE 4 MG: 4 INJECTION INTRAVENOUS at 09:26

## 2019-08-11 RX ADMIN — Medication 10 ML: at 21:23

## 2019-08-11 RX ADMIN — MORPHINE SULFATE 2 MG: 2 INJECTION, SOLUTION INTRAMUSCULAR; INTRAVENOUS at 22:51

## 2019-08-11 RX ADMIN — LAMOTRIGINE 25 MG: 25 TABLET ORAL at 08:24

## 2019-08-11 RX ADMIN — VILAZODONE HYDROCHLORIDE 20 MG: 20 TABLET ORAL at 08:24

## 2019-08-11 RX ADMIN — GABAPENTIN 100 MG: 100 CAPSULE ORAL at 08:24

## 2019-08-11 RX ADMIN — METRONIDAZOLE 500 MG: 500 INJECTION, SOLUTION INTRAVENOUS at 16:05

## 2019-08-11 RX ADMIN — KETOROLAC TROMETHAMINE 15 MG: 15 INJECTION, SOLUTION INTRAMUSCULAR; INTRAVENOUS at 19:40

## 2019-08-11 ASSESSMENT — PAIN DESCRIPTION - DESCRIPTORS
DESCRIPTORS: SHARP

## 2019-08-11 ASSESSMENT — PAIN SCALES - GENERAL
PAINLEVEL_OUTOF10: 9
PAINLEVEL_OUTOF10: 0
PAINLEVEL_OUTOF10: 7
PAINLEVEL_OUTOF10: 9
PAINLEVEL_OUTOF10: 6
PAINLEVEL_OUTOF10: 6
PAINLEVEL_OUTOF10: 7
PAINLEVEL_OUTOF10: 9
PAINLEVEL_OUTOF10: 2
PAINLEVEL_OUTOF10: 9

## 2019-08-11 ASSESSMENT — PAIN DESCRIPTION - LOCATION
LOCATION: ABDOMEN

## 2019-08-11 ASSESSMENT — PAIN DESCRIPTION - PAIN TYPE
TYPE: SURGICAL PAIN

## 2019-08-11 ASSESSMENT — PAIN DESCRIPTION - FREQUENCY
FREQUENCY: CONTINUOUS
FREQUENCY: CONTINUOUS

## 2019-08-11 ASSESSMENT — PAIN DESCRIPTION - ONSET
ONSET: ON-GOING
ONSET: ON-GOING

## 2019-08-11 ASSESSMENT — PAIN DESCRIPTION - ORIENTATION
ORIENTATION: MID;LOWER
ORIENTATION: MID;LOWER
ORIENTATION: LOWER
ORIENTATION: LOWER

## 2019-08-11 NOTE — PROGRESS NOTES
LFTs/ammonia    Recommendations:  · Continue IV Zosyn and Flagyl   · Continue IV fluids  · Levophed if SBP < 90  · Albuterol and Ipratropium Q 4 hours and prn  · 2 liters/min via nasal cannula if needed   · DVT prophylaxis with low molecular weight heparin  · Ok for transfer out of ICU  · Will follow with you    Electronically signed by Trevin Panda on 08/11/19 at 11:09 AM

## 2019-08-12 ENCOUNTER — APPOINTMENT (OUTPATIENT)
Dept: GENERAL RADIOLOGY | Age: 44
DRG: 854 | End: 2019-08-12
Payer: MEDICARE

## 2019-08-12 LAB
ABSOLUTE EOS #: <0.03 K/UL (ref 0–0.44)
ABSOLUTE IMMATURE GRANULOCYTE: 0.19 K/UL (ref 0–0.3)
ABSOLUTE LYMPH #: 1.01 K/UL (ref 1.1–3.7)
ABSOLUTE MONO #: 1.37 K/UL (ref 0.1–1.2)
BASOPHILS # BLD: 0 % (ref 0–2)
BASOPHILS ABSOLUTE: 0.05 K/UL (ref 0–0.2)
DIFFERENTIAL TYPE: ABNORMAL
EOSINOPHILS RELATIVE PERCENT: 0 % (ref 1–4)
GLUCOSE BLD-MCNC: 160 MG/DL (ref 75–110)
GLUCOSE BLD-MCNC: 212 MG/DL (ref 75–110)
HCT VFR BLD CALC: 49.9 % (ref 40.7–50.3)
HEMOGLOBIN: 16.6 G/DL (ref 13–17)
IMMATURE GRANULOCYTES: 1 %
LACTIC ACID: 1.4 MMOL/L (ref 0.5–2.2)
LYMPHOCYTES # BLD: 6 % (ref 24–43)
MCH RBC QN AUTO: 29.7 PG (ref 25.2–33.5)
MCHC RBC AUTO-ENTMCNC: 33.3 G/DL (ref 28.4–34.8)
MCV RBC AUTO: 89.4 FL (ref 82.6–102.9)
MONOCYTES # BLD: 9 % (ref 3–12)
NRBC AUTOMATED: 0 PER 100 WBC
PDW BLD-RTO: 13.1 % (ref 11.8–14.4)
PLATELET # BLD: 321 K/UL (ref 138–453)
PLATELET ESTIMATE: ABNORMAL
PMV BLD AUTO: 9 FL (ref 8.1–13.5)
POTASSIUM SERPL-SCNC: 3 MMOL/L (ref 3.7–5.3)
PROCALCITONIN: 1.41 NG/ML
RBC # BLD: 5.58 M/UL (ref 4.21–5.77)
RBC # BLD: ABNORMAL 10*6/UL
SEG NEUTROPHILS: 84 % (ref 36–65)
SEGMENTED NEUTROPHILS ABSOLUTE COUNT: 13.48 K/UL (ref 1.5–8.1)
WBC # BLD: 16.1 K/UL (ref 3.5–11.3)
WBC # BLD: ABNORMAL 10*3/UL

## 2019-08-12 PROCEDURE — 85025 COMPLETE CBC W/AUTO DIFF WBC: CPT

## 2019-08-12 PROCEDURE — 6370000000 HC RX 637 (ALT 250 FOR IP): Performed by: SURGERY

## 2019-08-12 PROCEDURE — 83605 ASSAY OF LACTIC ACID: CPT

## 2019-08-12 PROCEDURE — 6360000002 HC RX W HCPCS: Performed by: SURGERY

## 2019-08-12 PROCEDURE — 74018 RADEX ABDOMEN 1 VIEW: CPT

## 2019-08-12 PROCEDURE — 6360000002 HC RX W HCPCS: Performed by: INTERNAL MEDICINE

## 2019-08-12 PROCEDURE — 84132 ASSAY OF SERUM POTASSIUM: CPT

## 2019-08-12 PROCEDURE — 2500000003 HC RX 250 WO HCPCS: Performed by: INTERNAL MEDICINE

## 2019-08-12 PROCEDURE — 6370000000 HC RX 637 (ALT 250 FOR IP): Performed by: NURSE PRACTITIONER

## 2019-08-12 PROCEDURE — 84145 PROCALCITONIN (PCT): CPT

## 2019-08-12 PROCEDURE — 82947 ASSAY GLUCOSE BLOOD QUANT: CPT

## 2019-08-12 PROCEDURE — 36415 COLL VENOUS BLD VENIPUNCTURE: CPT

## 2019-08-12 PROCEDURE — 2580000003 HC RX 258: Performed by: SURGERY

## 2019-08-12 PROCEDURE — 2000000000 HC ICU R&B

## 2019-08-12 PROCEDURE — 99232 SBSQ HOSP IP/OBS MODERATE 35: CPT | Performed by: INTERNAL MEDICINE

## 2019-08-12 PROCEDURE — 2500000003 HC RX 250 WO HCPCS: Performed by: SURGERY

## 2019-08-12 PROCEDURE — 51702 INSERT TEMP BLADDER CATH: CPT

## 2019-08-12 PROCEDURE — 2580000003 HC RX 258: Performed by: INTERNAL MEDICINE

## 2019-08-12 PROCEDURE — 6360000002 HC RX W HCPCS: Performed by: NURSE PRACTITIONER

## 2019-08-12 RX ORDER — PROMETHAZINE HYDROCHLORIDE 25 MG/ML
25 INJECTION, SOLUTION INTRAMUSCULAR; INTRAVENOUS EVERY 6 HOURS PRN
Status: DISCONTINUED | OUTPATIENT
Start: 2019-08-12 | End: 2019-08-21 | Stop reason: HOSPADM

## 2019-08-12 RX ORDER — POTASSIUM CHLORIDE 29.8 MG/ML
20 INJECTION INTRAVENOUS ONCE
Status: DISCONTINUED | OUTPATIENT
Start: 2019-08-12 | End: 2019-08-12

## 2019-08-12 RX ORDER — SODIUM CHLORIDE 9 MG/ML
INJECTION, SOLUTION INTRAVENOUS ONCE
Status: COMPLETED | OUTPATIENT
Start: 2019-08-12 | End: 2019-08-12

## 2019-08-12 RX ORDER — POTASSIUM CHLORIDE 29.8 MG/ML
20 INJECTION INTRAVENOUS PRN
Status: DISCONTINUED | OUTPATIENT
Start: 2019-08-12 | End: 2019-08-21 | Stop reason: HOSPADM

## 2019-08-12 RX ADMIN — ONDANSETRON 4 MG: 2 INJECTION INTRAMUSCULAR; INTRAVENOUS at 12:50

## 2019-08-12 RX ADMIN — POTASSIUM CHLORIDE 20 MEQ: 400 INJECTION, SOLUTION INTRAVENOUS at 23:40

## 2019-08-12 RX ADMIN — MORPHINE SULFATE 4 MG: 4 INJECTION INTRAVENOUS at 12:49

## 2019-08-12 RX ADMIN — ONDANSETRON 4 MG: 2 INJECTION INTRAMUSCULAR; INTRAVENOUS at 01:20

## 2019-08-12 RX ADMIN — PROMETHAZINE HYDROCHLORIDE 25 MG: 25 INJECTION INTRAMUSCULAR; INTRAVENOUS at 08:00

## 2019-08-12 RX ADMIN — METRONIDAZOLE 500 MG: 500 INJECTION, SOLUTION INTRAVENOUS at 08:09

## 2019-08-12 RX ADMIN — PANTOPRAZOLE SODIUM 40 MG: 40 TABLET, DELAYED RELEASE ORAL at 08:14

## 2019-08-12 RX ADMIN — CALCIUM GLUCONATE: 94 INJECTION, SOLUTION INTRAVENOUS at 17:44

## 2019-08-12 RX ADMIN — Medication 10 ML: at 19:10

## 2019-08-12 RX ADMIN — INSULIN LISPRO 1 UNITS: 100 INJECTION, SOLUTION INTRAVENOUS; SUBCUTANEOUS at 17:42

## 2019-08-12 RX ADMIN — VILAZODONE HYDROCHLORIDE 20 MG: 20 TABLET ORAL at 08:12

## 2019-08-12 RX ADMIN — I.V. FAT EMULSION 100 ML: 20 EMULSION INTRAVENOUS at 17:42

## 2019-08-12 RX ADMIN — OXYBUTYNIN CHLORIDE 10 MG: 5 TABLET, EXTENDED RELEASE ORAL at 08:13

## 2019-08-12 RX ADMIN — ANTI-FUNGAL POWDER MICONAZOLE NITRATE TALC FREE: 1.42 POWDER TOPICAL at 08:18

## 2019-08-12 RX ADMIN — Medication 10 ML: at 08:12

## 2019-08-12 RX ADMIN — SODIUM CHLORIDE, POTASSIUM CHLORIDE, SODIUM LACTATE AND CALCIUM CHLORIDE: 600; 310; 30; 20 INJECTION, SOLUTION INTRAVENOUS at 22:47

## 2019-08-12 RX ADMIN — GABAPENTIN 100 MG: 100 CAPSULE ORAL at 21:30

## 2019-08-12 RX ADMIN — LAMOTRIGINE 25 MG: 25 TABLET ORAL at 08:08

## 2019-08-12 RX ADMIN — ANTI-FUNGAL POWDER MICONAZOLE NITRATE TALC FREE: 1.42 POWDER TOPICAL at 21:34

## 2019-08-12 RX ADMIN — KETOROLAC TROMETHAMINE 15 MG: 15 INJECTION, SOLUTION INTRAMUSCULAR; INTRAVENOUS at 04:55

## 2019-08-12 RX ADMIN — ONDANSETRON 4 MG: 2 INJECTION INTRAMUSCULAR; INTRAVENOUS at 17:58

## 2019-08-12 RX ADMIN — NYSTATIN: 100000 CREAM TOPICAL at 08:19

## 2019-08-12 RX ADMIN — FERROUS SULFATE TAB EC 325 MG (65 MG FE EQUIVALENT) 325 MG: 325 (65 FE) TABLET DELAYED RESPONSE at 08:14

## 2019-08-12 RX ADMIN — METRONIDAZOLE 500 MG: 500 INJECTION, SOLUTION INTRAVENOUS at 22:45

## 2019-08-12 RX ADMIN — SODIUM CHLORIDE: 9 INJECTION, SOLUTION INTRAVENOUS at 10:51

## 2019-08-12 RX ADMIN — POLYETHYLENE GLYCOL 3350 17 G: 17 POWDER, FOR SOLUTION ORAL at 07:58

## 2019-08-12 RX ADMIN — NYSTATIN: 100000 CREAM TOPICAL at 21:34

## 2019-08-12 RX ADMIN — GABAPENTIN 100 MG: 100 CAPSULE ORAL at 08:08

## 2019-08-12 RX ADMIN — POTASSIUM CHLORIDE 20 MEQ: 400 INJECTION, SOLUTION INTRAVENOUS at 22:23

## 2019-08-12 RX ADMIN — MORPHINE SULFATE 4 MG: 4 INJECTION INTRAVENOUS at 08:00

## 2019-08-12 RX ADMIN — TAMSULOSIN HYDROCHLORIDE 0.4 MG: 0.4 CAPSULE ORAL at 08:13

## 2019-08-12 RX ADMIN — ONDANSETRON 4 MG: 2 INJECTION INTRAMUSCULAR; INTRAVENOUS at 21:30

## 2019-08-12 RX ADMIN — LAMOTRIGINE 25 MG: 25 TABLET ORAL at 21:30

## 2019-08-12 RX ADMIN — INSULIN LISPRO 2 UNITS: 100 INJECTION, SOLUTION INTRAVENOUS; SUBCUTANEOUS at 23:26

## 2019-08-12 RX ADMIN — CEFTRIAXONE SODIUM 1 G: 1 INJECTION, POWDER, FOR SOLUTION INTRAMUSCULAR; INTRAVENOUS at 12:42

## 2019-08-12 RX ADMIN — ONDANSETRON 4 MG: 2 INJECTION INTRAMUSCULAR; INTRAVENOUS at 05:05

## 2019-08-12 RX ADMIN — PROMETHAZINE HYDROCHLORIDE 25 MG: 25 INJECTION INTRAMUSCULAR; INTRAVENOUS at 19:09

## 2019-08-12 RX ADMIN — METRONIDAZOLE 500 MG: 500 INJECTION, SOLUTION INTRAVENOUS at 15:40

## 2019-08-12 RX ADMIN — POTASSIUM CHLORIDE 20 MEQ: 400 INJECTION, SOLUTION INTRAVENOUS at 21:30

## 2019-08-12 ASSESSMENT — PAIN DESCRIPTION - PROGRESSION
CLINICAL_PROGRESSION: NOT CHANGED

## 2019-08-12 ASSESSMENT — PAIN DESCRIPTION - ORIENTATION
ORIENTATION: MID
ORIENTATION: MID

## 2019-08-12 ASSESSMENT — PAIN SCALES - GENERAL
PAINLEVEL_OUTOF10: 0
PAINLEVEL_OUTOF10: 7
PAINLEVEL_OUTOF10: 8

## 2019-08-12 ASSESSMENT — PAIN - FUNCTIONAL ASSESSMENT: PAIN_FUNCTIONAL_ASSESSMENT: ACTIVITIES ARE NOT PREVENTED

## 2019-08-12 ASSESSMENT — PAIN DESCRIPTION - ONSET: ONSET: ON-GOING

## 2019-08-12 ASSESSMENT — PAIN DESCRIPTION - FREQUENCY: FREQUENCY: CONTINUOUS

## 2019-08-12 ASSESSMENT — PAIN DESCRIPTION - DESCRIPTORS: DESCRIPTORS: ITCHING

## 2019-08-12 ASSESSMENT — PAIN DESCRIPTION - PAIN TYPE
TYPE: SURGICAL PAIN
TYPE: SURGICAL PAIN

## 2019-08-12 ASSESSMENT — PAIN DESCRIPTION - LOCATION
LOCATION: ABDOMEN
LOCATION: ABDOMEN

## 2019-08-12 NOTE — PROGRESS NOTES
Nutrition Assessment (Parenteral Nutrition)    Type and Reason for Visit: Initial, Consult(Start & mgmt of PN)    Nutrition Recommendations: 1. Suggest continuing on clear liquid diet as tolerated. 2. Consider starting PN-Adult 2-in-1 Central Line (Custom) @ 41.7 ml/hr, with IV Fat Emulsions 20% 100 ml, @ 8.33 ml/hr x 12 hrs. Include Additives:  Listed below. Nutrition Assessment: Pt sleeping @ time of visit. Started on clear liquid diet yesterday. However, noted +coffee ground emesis in basin, and +colostomy (0 ml output). Pt is @ risk for malnutrition post-op, with h/o Cerebral palsy. Spoke with Audelia Shell, Pharmacist about Pt & suggestion to initiate on Custom Central PN @ 41.7 ml/hr, with IV Fat Emulsions (20 g). Malnutrition Assessment:  · Malnutrition Status: At risk for malnutrition  · Findings of the 6 clinical characteristics of malnutrition (Minimum of 2 out of 6 clinical characteristics is required to make the diagnosis of moderate or severe Protein Calorie Malnutrition based on AND/ASPEN Guidelines):  1. Energy Intake-Less than or equal to 50% of estimated energy requirement, Greater than or equal to 5 days    2. Weight Loss-Unable to assess, unable to assess  3. Fat Loss-Unable to assess  4. Muscle Loss-Unable to assess  5. Fluid Accumulation-Mild fluid accumulation, Extremities  6.   Strength-Not measured    Nutrition Risk Level: High    Nutrient Needs:  · Estimated Daily Total Kcal: 1,750-1,900 kcal (31-33) Post-op  · Estimated Daily Protein (g): 65-75 g (1.2-1.3 g/kg) Post-op  · Estimated Daily Total Fluid (ml/day): 1,800-1,900 ml (1 ml/kcal)    Nutrition Diagnosis:   · Problem: Inadequate oral intake  · Etiology: related to Alteration in GI function     Signs and symptoms:  as evidenced by Nutrition support - PN, Intake 0-25%, Localized or generalized fluid accumulation, Lab values, Nausea, Vomiting    Objective Information:  · Nutrition-Focused Physical Findings: GI:  rounded, soft, distension, tenderness, last BM (8/8), no flatus, distant bowel sounds, colostomy, LLQ (0 ml output); PV:  BLE, non-pitting, +2; Skin:  Dusky, excoriation, groin, redness, (R) elbow/(L) hip, incision 8/9 abd medial (c/d/i)  · Wound Type: Surgical Wound  · Current Nutrition Therapies:  · Oral Diet Orders: Clear Liquid   · Oral Diet intake: 0%  · Parenteral Nutrition Orders:  · Type and Formula: Premix Central   · Lipids: 100ml, Daily  · Rate/Volume: 41.7 / 1000 ml  · Duration: Continuous   · Additives Include:  30 mEq NaCl, 30 mEq K Acetate, 15 mmol K PO4, 5 mEq Ca Gluconate, 5 mEq Mg SO4 & MVI/Trace Elements.   · Current PN Order Provides: 1,080 kcal, 50 g protein  · Goal PN Orders Provides: @ 65 ml/hr (goal):  1,573 kcal, 78 g protein  · Additional Calories: None  · Anthropometric Measures:  · Ht: 5' 5\" (165.1 cm)   · Admission Body Wt: 123 lb 14.4 oz (56.2 kg)  · Usual Body Wt: (None)  · % Weight Change: Unknown  · Ideal Body Wt: 147 lb 11.3 oz (67 kg), % Ideal Body 84%    Nutrition Interventions:   Continue current diet, Start Parenteral Nutrition  Continued Inpatient Monitoring, Coordination of Care    Nutrition Evaluation:   · Evaluation: Goals set   · Goals: PN intake to meet >80% of estimated kcal/protein needs   · Monitoring: Meal Intake, PN Intake, Diet Tolerance, PN Tolerance, Skin Integrity, Wound Healing, I&O, Mental Status/Confusion, Weight, Pertinent Labs, Nausea or Vomiting, Constipation, Monitor Bowel Function      Electronically signed by Henry Orozco, NICHOLAS, GEE on 8/12/19 at 4:50 PM    Contact Number: [phone]

## 2019-08-12 NOTE — PROGRESS NOTES
infection    Sepsis ([facility] [address]    Small bowel obstruction (HCC)    Hydrocephalus    Movement disorder    GERD (gastroesophageal reflux disease)    Cerebral palsy, athetoid (HCC)    Tobacco abuse    H/O small bowel obstruction    Fecal impaction (HCC)    Hyperglycemia    Chronic idiopathic constipation    Septicemia ([facility] [address]  Resolved Problems:    GI bleed    S/P left colectomy, colostomy    Plan  1. Up as tolerated  2. NPO except for ice chips  3. Continue antibiotics  4.  Await return of bowel function          [facility] [place], [address]

## 2019-08-12 NOTE — FLOWSHEET NOTE
Writer visits per quest to attempt to ascertain who patient's medical decision-maker is, in the event such person needs to be contacted. Patient resting in bed. There are no visitors. Patient engages in conversation, sharing that he \"feels out of sorts,\" and is \"just trying to get used to being here, and everyone is swarming me. \"     Patient tells [address] he lives at [facility]. He gives writer the name Rick Hnason, states she is a \"unit nurse manager,\"and asks that writer contact her. Writer offers support and listening presence. Patient expresses thanks. Spiritual Care will follow. Writer will attempt to reach iRck Hanson. 08/12/19 1217   Encounter Summary   Services provided to: Patient   Referral/Consult From: Other    Continue Visiting   (8/12/19)   Complexity of Encounter Moderate   Length of Encounter 15 minutes   Spiritual Assessment Completed Yes   Routine   Type Follow up   Assessment Calm   Intervention Active listening;Explored feelings, thoughts, concerns;Contacted support as requested per patient/family request   Who?  Rick Hanson   Why? pt request   At Request Of patient   Outcome Engaged in conversation

## 2019-08-12 NOTE — PROGRESS NOTES
chloride flush  10 mL Intravenous 2 times per day    pantoprazole  40 mg Oral Daily    cefTRIAXone (ROCEPHIN) IV  1 g Intravenous Q24H    insulin lispro  0-6 Units Subcutaneous TID WC    insulin lispro  0-3 Units Subcutaneous Nightly    nystatin   Topical BID    miconazole   Topical BID    metroNIDAZOLE  500 mg Intravenous Q8H     PRN Meds:   promethazine 25 mg Intramuscular Q6H PRN   potassium chloride 40 mEq Oral PRN   Or      potassium alternative oral replacement 40 mEq Oral PRN   Or      potassium chloride 10 mEq Intravenous PRN   ketorolac 15 mg Intravenous Q6H PRN   sodium chloride flush 10 mL Intravenous PRN   ondansetron 4 mg Intravenous Q4H PRN   benztropine mesylate 2 mg Intravenous Daily PRN   nicotine 1 patch Transdermal Daily PRN   acetaminophen 650 mg Oral Q4H PRN   glucose 15 g Oral PRN   dextrose 12.5 g Intravenous PRN   glucagon (rDNA) 1 mg Intramuscular PRN   dextrose 100 mL/hr Intravenous PRN   morphine 2 mg Intravenous Q2H PRN   Or      morphine 4 mg Intravenous Q2H PRN       Data:     Code Status:  Full Code     Ct Abdomen Pelvis W Iv Contrast Additional Contrast? None  Result Date: 8/10/2019  1. Markedly dilated rectosigmoid colon with mild dilation of the remaining colon. Portions of the stomach and colon are excluded on this exam as they are intrathoracic in location and due to field of view. There is a large amount of stool within the rectosigmoid colon with more focal high density area within the rectum likely more focal stool impaction rather than a mass, however, findings can be correlated with colonoscopy once cleared. An obvious colonic obstruction cannot be entirely excluded but is otherwise difficult to evaluate on this exam.  No small bowel dilation. 2. Type 4 hiatal hernia containing majority of the stomach as well as pancreas. 3. Mild wall thickening of the bladder despite decompression with a Martinez catheter. Recommend correlation with urinalysis.    4. Small left Yes    Small bowel obstruction ([address] 8/8/2019 Yes    Hydrocephalus 8/8/2019 Yes    Movement disorder 8/8/2019 Yes    GERD (gastroesophageal reflux disease) 8/9/2019 Yes    Cerebral palsy, athetoid ([address] 8/9/2019 Yes    Tobacco abuse 8/8/2019 Yes    H/O small bowel obstruction 8/8/2019 Yes    Fecal impaction ([address] 8/9/2019 Yes    Hyperglycemia 8/8/2019 Yes    Chronic idiopathic constipation 8/9/2019 Yes    Septicemia ([address] 8/9/2019 Yes        Past Medical History:   Diagnosis Date    Cerebral palsy ([address]     Cerebral palsy, athetoid (HCC)     Constipation     GERD (gastroesophageal reflux disease)     Headache     Hydrocephalus     Movement disorder     Psychiatric problem     Small bowel obstruction due to adhesions (HCC)     TIA (transient ischemic attack)         Consultations:     IP CONSULT TO INTERNAL MEDICINE  IP CONSULT TO GI  IP CONSULT TO CRITICAL CARE  IP CONSULT TO GENERAL SURGERY  IP CONSULT TO UROLOGY  IP CONSULT TO SPIRITUAL SERVICES  IP CONSULT TO CASE MANAGEMENT  IP CONSULT TO SOCIAL WORK    Plan:     1. Normal saline 500 mL's over 1 hour  2. Continue antibiotics Rocephin/Flagyl  3. GI prophylaxis  4. DVT prophylaxis  5. Recheck laboratories in the morning  6. Add lactic acid to this a.m. Laboratory  7. Pulmonary critical care consultation has been reviewed  8.  Pain control with Toradol 50 mg every 6 hours IV as needed      Electronically signed by Antonio Hill DO on 8/12/2019 at 9:36 AM

## 2019-08-12 NOTE — PROGRESS NOTES
Pulmonary Critical Care Progress Note  Edith Ramirez MD     Patient seen for the follow up of hypotension/dehydration, Generalized abdominal pain     Subjective:  He denies chest pain, cough, or shortness of breath. He has abdominal discomfort at his surgical site. Nursing reports emesis with clear liquid diet. His blood pressures have been stable. Examination:  Vitals: /89   Pulse 131   Temp 98.8 °F (37.1 °C) (Infrared)   Resp 22   Ht 5' 5\" (1.651 m)   Wt 124 lb (56.2 kg)   SpO2 97%   BMI 20.63 kg/m²   General appearance: alert and cooperative with exam  Neck: No JVD  Lungs: diminished bibasilarly   Heart: + tachycardia, S1, S2 normal, no gallop  Abdomen: Soft, non tender, + BS  Extremities: no cyanosis or clubbing.  No significant edema    LABs:  CBC:   Recent Labs     08/11/19 0618 08/12/19  0458   WBC 9.5 16.1*   HGB 11.4* 16.6   HCT 34.6* 49.9    321     BMP:   Recent Labs     08/10/19  0615 08/11/19  0618    139   K 3.9 3.3*   CO2 19* 24   BUN 12 7   CREATININE 0.50* 0.50*   LABGLOM >60 >60   GLUCOSE 109* 88     LIVER PROFILE:  Recent Labs     08/11/19 0618   AST 43*   ALT 20   LABALBU 2.2*     ABG:  Lab Results   Component Value Date    TQA1VDV 20 08/08/2019    FIO2 21.0 08/08/2019       Lab Results   Component Value Date    POCPH 7.36 08/08/2019    POCPCO2 34 08/08/2019    POCPO2 74 08/08/2019    POCHCO3 18.8 08/08/2019    NBEA 7 08/08/2019    PBEA NOT REPORTED 08/08/2019    MSY4OHP 20 08/08/2019    FJHF3IXY 94 08/08/2019    FIO2 21.0 08/08/2019     Impression:  · Hypotension/dehydration - resolved  · Significant colon dilatation - s/p left colectomy/colostomy 8/9  · Pneumonia/atelectasis  · Cerebral palsy/hydrocephalus  · History of smoking/emphysema  · Right inguinal hernia  · Hiatal hernia  · Mildly elevated LFTs/ammonia    Recommendations:  · Continue IV Zosyn and Flagyl   · Continue IV fluids  · Levophed if SBP < 90  · Pain control  · Start TPN, discussed with RN  · Albuterol and Ipratropium Q 4 hours and prn  · 2 liters/min via nasal cannula if needed   · DVT prophylaxis with low molecular weight heparin  · Incentive spirometry every hour while awake  · Ok for transfer out of ICU  · Will follow with you      Caitlyn Payton MD, [facility]  Pulmonary Critical Care and Sleep Medicine,  [facility]  Cell: [phone]  Office: [phone]

## 2019-08-13 ENCOUNTER — APPOINTMENT (OUTPATIENT)
Dept: CT IMAGING | Age: 44
DRG: 854 | End: 2019-08-13
Payer: MEDICARE

## 2019-08-13 ENCOUNTER — APPOINTMENT (OUTPATIENT)
Dept: GENERAL RADIOLOGY | Age: 44
DRG: 854 | End: 2019-08-13
Payer: MEDICARE

## 2019-08-13 LAB
-: ABNORMAL
-: NORMAL
ABSOLUTE EOS #: 0 K/UL (ref 0–0.4)
ABSOLUTE IMMATURE GRANULOCYTE: 0.46 K/UL (ref 0–0.3)
ABSOLUTE LYMPH #: 2.76 K/UL (ref 1–4.8)
ABSOLUTE MONO #: 2.76 K/UL (ref 0.2–0.8)
ALBUMIN SERPL-MCNC: 2.7 G/DL (ref 3.5–5.2)
ALBUMIN/GLOBULIN RATIO: ABNORMAL (ref 1–2.5)
ALP BLD-CCNC: 65 U/L (ref 40–129)
ALT SERPL-CCNC: 18 U/L (ref 5–41)
AMORPHOUS: ABNORMAL
ANION GAP SERPL CALCULATED.3IONS-SCNC: 11 MMOL/L (ref 9–17)
AST SERPL-CCNC: 31 U/L
BACTERIA: ABNORMAL
BASOPHILS # BLD: 0 %
BASOPHILS ABSOLUTE: 0 K/UL (ref 0–0.2)
BILIRUB SERPL-MCNC: 0.21 MG/DL (ref 0.3–1.2)
BILIRUBIN URINE: ABNORMAL
BUN BLDV-MCNC: 24 MG/DL (ref 6–20)
BUN/CREAT BLD: 55 (ref 9–20)
CALCIUM SERPL-MCNC: 8.3 MG/DL (ref 8.6–10.4)
CASTS UA: ABNORMAL /LPF
CHLORIDE BLD-SCNC: 98 MMOL/L (ref 98–107)
CO2: 29 MMOL/L (ref 20–31)
COLOR: YELLOW
COMMENT UA: ABNORMAL
CREAT SERPL-MCNC: 0.44 MG/DL (ref 0.7–1.2)
CRYSTALS, UA: ABNORMAL /HPF
DIFFERENTIAL TYPE: ABNORMAL
EOSINOPHILS RELATIVE PERCENT: 0 % (ref 1–4)
EPITHELIAL CELLS UA: ABNORMAL /HPF (ref 0–5)
GFR AFRICAN AMERICAN: >60 ML/MIN
GFR NON-AFRICAN AMERICAN: >60 ML/MIN
GFR SERPL CREATININE-BSD FRML MDRD: ABNORMAL ML/MIN/{1.73_M2}
GFR SERPL CREATININE-BSD FRML MDRD: ABNORMAL ML/MIN/{1.73_M2}
GLUCOSE BLD-MCNC: 120 MG/DL (ref 75–110)
GLUCOSE BLD-MCNC: 131 MG/DL (ref 75–110)
GLUCOSE BLD-MCNC: 133 MG/DL (ref 75–110)
GLUCOSE BLD-MCNC: 147 MG/DL (ref 75–110)
GLUCOSE BLD-MCNC: 160 MG/DL (ref 75–110)
GLUCOSE BLD-MCNC: 171 MG/DL (ref 70–99)
GLUCOSE URINE: NEGATIVE
HCT VFR BLD CALC: 50.6 % (ref 40.7–50.3)
HEMOGLOBIN: 16.6 G/DL (ref 13–17)
IMMATURE GRANULOCYTES: 2 %
KETONES, URINE: NEGATIVE
LACTIC ACID: 3.5 MMOL/L (ref 0.5–2.2)
LEUKOCYTE ESTERASE, URINE: ABNORMAL
LYMPHOCYTES # BLD: 12 % (ref 24–44)
MAGNESIUM: 1.8 MG/DL (ref 1.6–2.6)
MCH RBC QN AUTO: 29.5 PG (ref 25.2–33.5)
MCHC RBC AUTO-ENTMCNC: 32.8 G/DL (ref 28–38)
MCV RBC AUTO: 89.9 FL (ref 82.6–102.9)
MONOCYTES # BLD: 12 % (ref 1–7)
MUCUS: ABNORMAL
NITRITE, URINE: NEGATIVE
NRBC AUTOMATED: ABNORMAL PER 100 WBC
OTHER OBSERVATIONS UA: ABNORMAL
PDW BLD-RTO: 12.9 % (ref 11.8–14.4)
PH UA: 7 (ref 5–8)
PLATELET # BLD: 382 K/UL (ref 138–453)
PLATELET ESTIMATE: ABNORMAL
PMV BLD AUTO: 9.2 FL (ref 8.1–13.5)
POTASSIUM SERPL-SCNC: 4.4 MMOL/L (ref 3.7–5.3)
PROTEIN UA: ABNORMAL
RBC # BLD: 5.63 M/UL (ref 4.21–5.77)
RBC # BLD: ABNORMAL 10*6/UL
RBC UA: ABNORMAL /HPF (ref 0–2)
REASON FOR REJECTION: NORMAL
RENAL EPITHELIAL, UA: ABNORMAL /HPF
SEG NEUTROPHILS: 74 % (ref 36–66)
SEGMENTED NEUTROPHILS ABSOLUTE COUNT: 17.02 K/UL (ref 1.8–7.7)
SODIUM BLD-SCNC: 138 MMOL/L (ref 135–144)
SPECIFIC GRAVITY UA: 1.01 (ref 1–1.03)
SURGICAL PATHOLOGY REPORT: NORMAL
TOTAL PROTEIN: 4.7 G/DL (ref 6.4–8.3)
TRICHOMONAS: ABNORMAL
TRIGL SERPL-MCNC: 83 MG/DL
TURBIDITY: ABNORMAL
URINE HGB: NEGATIVE
UROBILINOGEN, URINE: NORMAL
WBC # BLD: 23 K/UL (ref 3.5–11.3)
WBC # BLD: ABNORMAL 10*3/UL
WBC UA: ABNORMAL /HPF (ref 0–5)
YEAST: ABNORMAL
ZZ NTE CLEAN UP: ORDERED TEST: NORMAL
ZZ NTE WITH NAME CLEAN UP: SPECIMEN SOURCE: NORMAL

## 2019-08-13 PROCEDURE — 6370000000 HC RX 637 (ALT 250 FOR IP): Performed by: NURSE PRACTITIONER

## 2019-08-13 PROCEDURE — 83605 ASSAY OF LACTIC ACID: CPT

## 2019-08-13 PROCEDURE — 81001 URINALYSIS AUTO W/SCOPE: CPT

## 2019-08-13 PROCEDURE — 74018 RADEX ABDOMEN 1 VIEW: CPT

## 2019-08-13 PROCEDURE — 2500000003 HC RX 250 WO HCPCS: Performed by: INTERNAL MEDICINE

## 2019-08-13 PROCEDURE — 84478 ASSAY OF TRIGLYCERIDES: CPT

## 2019-08-13 PROCEDURE — 36415 COLL VENOUS BLD VENIPUNCTURE: CPT

## 2019-08-13 PROCEDURE — 82947 ASSAY GLUCOSE BLOOD QUANT: CPT

## 2019-08-13 PROCEDURE — 2580000003 HC RX 258: Performed by: INTERNAL MEDICINE

## 2019-08-13 PROCEDURE — 99222 1ST HOSP IP/OBS MODERATE 55: CPT | Performed by: INTERNAL MEDICINE

## 2019-08-13 PROCEDURE — 74176 CT ABD & PELVIS W/O CONTRAST: CPT

## 2019-08-13 PROCEDURE — 85025 COMPLETE CBC W/AUTO DIFF WBC: CPT

## 2019-08-13 PROCEDURE — 87086 URINE CULTURE/COLONY COUNT: CPT

## 2019-08-13 PROCEDURE — 2000000000 HC ICU R&B

## 2019-08-13 PROCEDURE — 2500000003 HC RX 250 WO HCPCS: Performed by: SURGERY

## 2019-08-13 PROCEDURE — C9113 INJ PANTOPRAZOLE SODIUM, VIA: HCPCS | Performed by: SURGERY

## 2019-08-13 PROCEDURE — 6360000002 HC RX W HCPCS: Performed by: SURGERY

## 2019-08-13 PROCEDURE — 71250 CT THORAX DX C-: CPT

## 2019-08-13 PROCEDURE — 51702 INSERT TEMP BLADDER CATH: CPT

## 2019-08-13 PROCEDURE — 83735 ASSAY OF MAGNESIUM: CPT

## 2019-08-13 PROCEDURE — 2580000003 HC RX 258: Performed by: SURGERY

## 2019-08-13 PROCEDURE — 97163 PT EVAL HIGH COMPLEX 45 MIN: CPT

## 2019-08-13 PROCEDURE — 97110 THERAPEUTIC EXERCISES: CPT

## 2019-08-13 PROCEDURE — 6360000002 HC RX W HCPCS: Performed by: NURSE PRACTITIONER

## 2019-08-13 PROCEDURE — 99232 SBSQ HOSP IP/OBS MODERATE 35: CPT | Performed by: INTERNAL MEDICINE

## 2019-08-13 PROCEDURE — 80053 COMPREHEN METABOLIC PANEL: CPT

## 2019-08-13 PROCEDURE — 6360000002 HC RX W HCPCS: Performed by: INTERNAL MEDICINE

## 2019-08-13 PROCEDURE — 87040 BLOOD CULTURE FOR BACTERIA: CPT

## 2019-08-13 PROCEDURE — 97530 THERAPEUTIC ACTIVITIES: CPT

## 2019-08-13 RX ORDER — 0.9 % SODIUM CHLORIDE 0.9 %
10 VIAL (ML) INJECTION DAILY
Status: DISCONTINUED | OUTPATIENT
Start: 2019-08-13 | End: 2019-08-21 | Stop reason: HOSPADM

## 2019-08-13 RX ORDER — PANTOPRAZOLE SODIUM 40 MG/10ML
40 INJECTION, POWDER, LYOPHILIZED, FOR SOLUTION INTRAVENOUS DAILY
Status: DISCONTINUED | OUTPATIENT
Start: 2019-08-13 | End: 2019-08-21 | Stop reason: HOSPADM

## 2019-08-13 RX ADMIN — MORPHINE SULFATE 2 MG: 2 INJECTION, SOLUTION INTRAMUSCULAR; INTRAVENOUS at 22:19

## 2019-08-13 RX ADMIN — PIPERACILLIN SODIUM,TAZOBACTAM SODIUM 3.38 G: 3; .375 INJECTION, POWDER, FOR SOLUTION INTRAVENOUS at 17:53

## 2019-08-13 RX ADMIN — METRONIDAZOLE 500 MG: 500 INJECTION, SOLUTION INTRAVENOUS at 08:06

## 2019-08-13 RX ADMIN — ANTI-FUNGAL POWDER MICONAZOLE NITRATE TALC FREE: 1.42 POWDER TOPICAL at 08:11

## 2019-08-13 RX ADMIN — NYSTATIN: 100000 CREAM TOPICAL at 08:11

## 2019-08-13 RX ADMIN — Medication 10 ML: at 12:16

## 2019-08-13 RX ADMIN — PIPERACILLIN SODIUM,TAZOBACTAM SODIUM 3.38 G: 3; .375 INJECTION, POWDER, FOR SOLUTION INTRAVENOUS at 10:26

## 2019-08-13 RX ADMIN — PANTOPRAZOLE SODIUM 40 MG: 40 INJECTION, POWDER, FOR SOLUTION INTRAVENOUS at 12:16

## 2019-08-13 RX ADMIN — ONDANSETRON 4 MG: 2 INJECTION INTRAMUSCULAR; INTRAVENOUS at 06:18

## 2019-08-13 RX ADMIN — CALCIUM GLUCONATE: 94 INJECTION, SOLUTION INTRAVENOUS at 17:47

## 2019-08-13 RX ADMIN — PROMETHAZINE HYDROCHLORIDE 25 MG: 25 INJECTION INTRAMUSCULAR; INTRAVENOUS at 02:44

## 2019-08-13 RX ADMIN — SODIUM CHLORIDE, POTASSIUM CHLORIDE, SODIUM LACTATE AND CALCIUM CHLORIDE: 600; 310; 30; 20 INJECTION, SOLUTION INTRAVENOUS at 10:27

## 2019-08-13 RX ADMIN — INSULIN LISPRO 1 UNITS: 100 INJECTION, SOLUTION INTRAVENOUS; SUBCUTANEOUS at 08:09

## 2019-08-13 RX ADMIN — ONDANSETRON 4 MG: 2 INJECTION INTRAMUSCULAR; INTRAVENOUS at 02:18

## 2019-08-13 RX ADMIN — I.V. FAT EMULSION 100 ML: 20 EMULSION INTRAVENOUS at 17:45

## 2019-08-13 ASSESSMENT — PAIN DESCRIPTION - PAIN TYPE: TYPE: SURGICAL PAIN

## 2019-08-13 ASSESSMENT — PAIN DESCRIPTION - LOCATION: LOCATION: ABDOMEN

## 2019-08-13 ASSESSMENT — PAIN SCALES - GENERAL
PAINLEVEL_OUTOF10: 7
PAINLEVEL_OUTOF10: 0

## 2019-08-13 NOTE — FLOWSHEET NOTE
08/13/19 8176   Provider Notification   Reason for Communication Evaluate; Review case  (emesis x8 episodes through night)   Provider Name Dr. Solano [facility]   Provider Notification Physician   Method of Communication Secure Message   Response Waiting for response   Notification Time [id number]

## 2019-08-13 NOTE — CARE COORDINATION
MINAL received call from pt KEILA Mesa regarding contacting Pa[facility] Woodruff to see if pt motorized chair can be brought to the hospital for pt to use, due to being so contracted. Wendieas Jaspreet reports speaking with her  and the facility is low staff and are not able to bring the chair today. Nicole Vogel stated the hospital is welcome to pick the chair. Nicole Vogel reports she will make this a priority tomorrow and keep SW informed regarding if the chair will be transported here.    MINAL updated pt KEILA Mesa

## 2019-08-13 NOTE — PROGRESS NOTES
bowel sounds; PV:  BLE, non-pitting, +1; Skin:  pale, excoriation, groin, redness, (R) elbow/(L) hip, incision 8/9 abd medial (c/d/i)  · Wound Type: Surgical Wound  · Current Nutrition Therapies:  · Oral Diet Orders: Clear Liquid   · Oral Diet intake: 1-25%  · Parenteral Nutrition Orders:  · Type and Formula: Premix Central   · Lipids: 100ml, Daily  · Rate/Volume: 50 / 1,200   · Duration: Continuous   · Additive Changes:  Increase NaCl 30 to 60 mEq, decrease K Acetate 30 to 10 mEq, increase K PO4 15 to 17 mmol, increase Ca Gluconate 5 to 7 mEq, increase Mg SO4 5 to 8 mEq, remove MVI/Trace Elements & add 10 Units INS.   · Current PN Order Provides: 1,256 kcal, 60 g protein  · Goal PN Orders Provides: @ 65 ml/hr (goal):  1,573 kcal, 78 g protein  · Additional Calories: None  · Anthropometric Measures:  · Ht: 5' 5\" (165.1 cm)   · Admission Body Wt: 123 lb 14.4 oz (56.2 kg)  · Usual Body Wt: (None)  · % Weight Change:  Unknown  · Ideal Body Wt: 147 lb 11.3 oz (67 kg), % Ideal Body 84%  · BMI Classification: BMI 18.5 - 24.9 Normal Weight    Nutrition Interventions:   Modify current diet, Modify current Parenteral Nutrition  Continued Inpatient Monitoring, Coordination of Care    Nutrition Evaluation:   · Evaluation: Progressing toward goals   · Goals: PN intake to meet >80% of estimated kcal/protein needs   · Monitoring: PN Intake, PN Tolerance, Skin Integrity, Wound Healing, I&O, Mental Status/Confusion, Weight, Pertinent Labs, Nausea or Vomiting, Constipation, Monitor Bowel Function      Electronically signed by Henry Contreras, NICHOLAS, GEE on 8/13/19 at 5:27 PM    Contact Number: [phone]

## 2019-08-13 NOTE — PROGRESS NOTES
pneumonia. The hernia does not appear to be resulting in a bowel obstruction. Xr Abdomen For Ng/og/ne Tube Placement  Result Date: 8/13/2019  Enteric tube coiled in the stomach, within a large hiatal hernia. Partially visualized abdomen demonstrates findings consistent with postoperative ileus. Ct Abdomen Pelvis W Iv Contrast Additional Contrast? None  Result Date: 8/10/2019  1. Markedly dilated rectosigmoid colon with mild dilation of the remaining colon. Portions of the stomach and colon are excluded on this exam as they are intrathoracic in location and due to field of view. There is a large amount of stool within the rectosigmoid colon with more focal high density area within the rectum likely more focal stool impaction rather than a mass, however, findings can be correlated with colonoscopy once cleared. An obvious colonic obstruction cannot be entirely excluded but is otherwise difficult to evaluate on this exam.  No small bowel dilation. 2. Type 4 hiatal hernia containing majority of the stomach as well as pancreas. 3. Mild wall thickening of the bladder despite decompression with a Martinez catheter. Recommend correlation with urinalysis. 4. Small left inguinal hernia containing bladder. 5. Age-indeterminate compression deformity of L1. Ct Head Wo Contrast  Addendum Date: 8/10/2019    ADDENDUM: Outside CT dated 08/23/2016 was submitted for comparison. The marked ventriculomegaly is similar in appearance compared to prior study. No major interval changes. Original report remains unchanged. Result Date: 8/10/2019  1. Marked ventriculomegaly. This was reported on outside head CTs dated 03/14/2016, and 08/23/2016. No prior outside head CTs are available for comparison. If previous outside head CTs are provided for comparison, then an addendum will be issued at that time. 2. No acute intracranial hemorrhage or large territory acute infarction.      Ct Chest Wo Contrast  Result Date: 8/9/2019  Left lower lobe collapse with resultant elevation of left hemidiaphragm and extension of the stomach and left colon into the thoracic cavity with a large amount of stool. Superimposed left lower lobe pneumonia is not excluded. No acute osseous abnormality. Xr Abdomen (kub) (single Ap View)  Result Date: 8/9/2019  No evidence of bowel obstruction. Constipation. Xr Chest Portable  Result Date: 8/8/2019  Elevated left hemidiaphragm suggesting atelectasis. Mild central congestion.        Labs:    Hematology:  Recent Labs     08/11/19 0618 08/12/19 0458 08/13/19  0723   WBC 9.5 16.1* 23.0*   RBC 3.78* 5.58 5.63   HGB 11.4* 16.6 16.6   HCT 34.6* 49.9 50.6*   MCV 91.5 89.4 89.9   MCH 30.2 29.7 29.5   MCHC 32.9 33.3 32.8   RDW 13.3 13.1 12.9    321 382   MPV 8.7 9.0 9.2   SEGS 63 84* 74*   LYMPHOPCT 22* 6* 12*   MONOPCT 13* 9 12*   EOSRELPCT 1 0* 0*   BASOPCT 0 0 0     Chemistry:  Recent Labs     08/11/19 0618 08/11/19 0820 08/12/19 0458 08/12/19  1028 08/12/19 2015 08/13/19  0624 08/13/19  0723     --   --   --   --  138  --    K 3.3*  --   --   --  3.0* 4.4  --      --   --   --   --  98  --    CO2 24  --   --   --   --  29  --    GLUCOSE 88  --   --   --   --  171*  --    BUN 7  --   --   --   --  24*  --    CREATININE 0.50*  --   --   --   --  0.44*  --    MG  --   --   --   --   --  1.8  --    ANIONGAP 10  --   --   --   --  11  --    LABGLOM >60  --   --   --   --  >60  --    GFRAA >60  --   --   --   --  >60  --    CALCIUM 7.7*  --   --   --   --  8.3*  --    CAION  --  1.17  --   --   --   --   --    LACTA  --   --   --  1.4  --   --  3.5*   PROCAL  --   --  1.41*  --   --   --   --      Recent Labs     08/11/19  0618 08/13/19  0624   PROT 4.2* 4.7*   LABALBU 2.2* 2.7*   AST 43* 31   ALT 20 18   ALKPHOS 59 65   BILITOT 0.35 0.21*   TRIG  --  83     Glucose:  Recent Labs     08/10/19  1618 08/10/19  2000 08/12/19  1628 08/12/19  2322 08/13/19  0620 08/13/19  0807   POCGLU

## 2019-08-13 NOTE — CONSULTS
appendectomy on 8/9/2019. The patient has been seen by other consultants including the pulmonary critical care service. There is been some concern for pneumonia and urinary tract infection. The patient has been on IV antimicrobial therapy with Rocephin and metronidazole initially. The patient has been having episodes of nausea with associated emesis. The white blood cell count has been somewhat elevated though the patient has remained afebrile. The antimicrobial therapy was broadened to Zosyn and metronidazole and vancomycin was added this morning. The patient overnight did have bouts of emesis and has subsequently had an NG tube placed and it is my understanding overnight he had close to 3 L of fluid removed via the NG tube/emesis. He did have a follow-up CAT scan of the abdomen pelvis done given elevated white blood cell count as well as increasing lactic acid. I was asked to evaluate and help with antibiotic choice. I have personally reviewed the past medical history, past surgical history, medications, social history, and family history, and I have updated the database accordingly.   Past Medical History:     Past Medical History:   Diagnosis Date    Cerebral palsy (HCC)     Cerebral palsy, athetoid (HCC)     Constipation     GERD (gastroesophageal reflux disease)     Headache     Hydrocephalus     Movement disorder     Psychiatric problem     Small bowel obstruction due to adhesions (HCC)     TIA (transient ischemic attack)      Past Surgical  History:     Past Surgical History:   Procedure Laterality Date    ABDOMEN SURGERY  08/09/2019    exploratory lap with left colectomy    APPENDECTOMY  08/09/2019    CHOLECYSTECTOMY      COLOSTOMY  08/09/2019    EYE SURGERY Right     lazy eye    SMALL INTESTINE SURGERY N/A 8/9/2019    EXPLORATORY LAPAROTOMY, LEFT COLECTOMY WITH COLOSTOMY AND APPENDECTOMY performed by Skyler Vega DO at [facility] Medications:      piperacillin-tazobactam  3.375 g Intravenous Q8H    fat emulsion  100 mL Intravenous Daily    insulin lispro  0-6 Units Subcutaneous Q6H    polyethylene glycol  17 g Oral Daily    fentaNYL  1 patch Transdermal Q72H    ferrous sulfate  325 mg Oral Daily    gabapentin  100 mg Oral BID    lamoTRIgine  25 mg Oral BID    oxybutynin  10 mg Oral Daily    tamsulosin  0.4 mg Oral Daily    vilazodone HCl  20 mg Oral Daily    [START ON 8/15/2019] vitamin D  50,000 Units Oral Weekly    sodium chloride flush  10 mL Intravenous 2 times per day    pantoprazole  40 mg Oral Daily    nystatin   Topical BID    miconazole   Topical BID     Social History:     Social History     Socioeconomic History    Marital status: Single     Spouse name: Not on file    Number of children: 0    Years of education: Not on file    Highest education level: Not on file   Occupational History    Not on file   Social Needs    Financial resource strain: Not on file    Food insecurity:     Worry: Not on file     Inability: Not on file    Transportation needs:     Medical: Not on file     Non-medical: Not on file   Tobacco Use    Smoking status: Former Smoker     Packs/day: 1.00     Years: 30.00     Pack years: 30.00     Types: Cigarettes     Last attempt to quit: 2019     Years since quittin.2    Smokeless tobacco: Never Used   Substance and Sexual Activity    Alcohol use: No     Alcohol/week: 0.0 standard drinks    Drug use: No    Sexual activity: Not on file   Lifestyle    Physical activity:     Days per week: Not on file     Minutes per session: Not on file    Stress: Not on file   Relationships    Social connections:     Talks on phone: Not on file     Gets together: Not on file     Attends Christianity service: Not on file     Active member of club or organization: Not on file     Attends meetings of clubs or organizations: Not on file     Relationship status: Not on file    Intimate partner violence:     Fear of current or ex partner: Not on file     Emotionally abused: Not on file     Physically abused: Not on file     Forced sexual activity: Not on file   Other Topics Concern    Not on file   Social History Narrative    Not on file     Family History:     Family History   Problem Relation Age of Onset    Diabetes Mother     COPD Father       Allergies:   Pollen extract     Review of Systems:   I was not able to get a system review of the patient was sleeping and though he did look at me when I went in to evaluate him and aroused him he was not verbalizing. It is my understanding that the normally the patient is able to verbalize and express himself. Physical Examination :   /84   Pulse 111   Temp 101 °F (38.3 °C) (Oral)   Resp 22   Ht 5' 5\" (1.651 m)   Wt 124 lb (56.2 kg)   SpO2 97%   BMI 20.63 kg/m²     Temperature Range: Temp: 101 °F (38.3 °C) Temp  Av.6 °F (37 °C)  Min: 97.4 °F (36.3 °C)  Max: 101 °F (38.3 °C)  General Appearance: The patient is fatigue/sedated and did open his eyes but did not answer any questioning for me. Head: Normocephalic, without obvious abnormality, atraumatic  Eyes: pupils equal, round, and reactive to light and Pupils equal, round, reactive, to light and accommodation; extraocular movements intact; sclera anicteric; conjunctivae pink  ENT: Oropharynx clear, without erythema, exudate, or thrush. Neck: Supple, without lymphadenopathy. Pulmonary/Chest: Clear to auscultation, without wheezes, rales, or rhonchi  Cardiovascular: Regular rate and rhythm without murmurs, rubs, or gallops. Abdomen: The patient has a midline incision which is well approximated has staples in place no signs of drainage/cellulitis noted. There is a left-sided ostomy with only liquid/serous fluid output. Abdomen is otherwise soft, nontender, nondistended. Extremities:  The patient does have chronic contractures in the left and there is increased tone in the lower tube coiled in the stomach, within a large hiatal hernia. Partially visualized abdomen demonstrates findings consistent with postoperative ileus. CT OF THE CHEST WITHOUT CONTRAST 8/8/2019 3:25 pm  FINDINGS:   Left lower lobe collapse with resultant elevation of left hemidiaphragm and extension of the stomach and left colon into the thoracic cavity with a large amount of stool. Superimposed left lower lobe pneumonia is not excluded. No acute osseous abnormality. CT OF THE ABDOMEN AND PELVIS WITH CONTRAST 8/8/2019 10:10 am   FINDINGS:   1. Markedly dilated rectosigmoid colon with mild dilation of the remaining colon. Portions of the stomach and colon are excluded on this exam as they are intrathoracic in location and due to field of view. There is a large amount of stool within the rectosigmoid colon with more focal high density area within the rectum likely more focal stool impaction rather than a mass, however, findings can be correlated with colonoscopy once cleared. An obvious colonic obstruction cannot be entirely excluded but is otherwise difficult to evaluate on this exam.  No small bowel dilation. 2. Type 4 hiatal hernia containing majority of the stomach as well as pancreas. 3. Mild wall thickening of the bladder despite decompression with a Martinez catheter. Recommend correlation with urinalysis. 4. Small left inguinal hernia containing bladder. 5. Age-indeterminate compression deformity of L1.       CT OF THE HEAD WITHOUT CONTRAST  8/8/2019 10:10 am   FINDINGS:   1. Marked ventriculomegaly. This was reported on outside head CTs dated 03/14/2016, and 08/23/2016. No prior outside head CTs are available for comparison. If previous outside head CTs are provided for comparison, then an addendum will be issued at that time. 2. No acute intracranial hemorrhage or large territory acute infarction.    Addendum Date: 8/10/2019    ADDENDUM: Outside CT dated 08/23/2016 was submitted for comparison. The marked ventriculomegaly is similar in appearance compared to prior study. No major interval changes. Original report remains unchanged. ONE XRAY VIEW OF THE CHEST 8/8/2019 10:37 am  FINDINGS:   Elevated left hemidiaphragm suggesting atelectasis. Mild central congestion. Cultures:     Culture blood #2 [[id number]] Collected: 08/08/19 1035   Order Status: Completed Specimen: Blood Updated: 08/12/19 0757    Specimen Description . BLOOD    Special Requests 10 ML  LEFT ARM    Culture NO GROWTH 4 DAYS   Culture blood #1 [[id number]] Collected: 08/08/19 1011   Order Status: Completed Specimen: Blood Updated: 08/12/19 0757    Specimen Description . BLOOD    Special Requests 4 ML  RIGHT FOREARM    Culture NO GROWTH 4 DAYS   Cult,Stool [[id number]] Collected: 08/08/19 1100   Order Status: Completed Specimen: Stool Updated: 08/09/19 1456    Specimen Description . FECES    Campylobacter PCR NEGATIVE: No Campylobacter spp. (jejuni or coli) DNA Detected    Salmonella PCR NEGATIVE: No Salmonella spp. DNA Detected    Shigatoxin Gene PCR NEGATIVE: No Shiga toxin-producing gene(s) Detected    Shigella Sp PCR NEGATIVE: No Shigella spp. / EIEC DNA Detected    Plesiomonas Shigelloides PCR NEGATIVE: No Plesionomas shigelloides DNA Detected    Vibrio PCR NEGATIVE: No Vibrio (V. vulnificus, V, parahaemolyticus and V. cholerae) DNA Detected    E Coli Enterotoxigenic PCR NEGATIVE: No Enterotoxigenic E. coli (ETEC) Heat-labile and heat-stable (LT/ST) DNA Detected    Yersinia Enterocolitica PCR NEGATIVE: No Yersinia enterocolitica DNA Detected   Urine Culture [[id number]] Collected: 08/08/19 1100   Order Status: Completed Specimen: Urine, clean catch Updated: 08/09/19 1404    Specimen Description . CLEAN CATCH URINE    Special Requests NOT REPORTED    Culture NO GROWTH   C DIFF TOXIN/ANTIGEN [[id number]] Collected: 08/08/19 1100   Order Status: Completed Specimen: Stool Updated: 08/09/19 1058    Specimen Description Miriam Daily FECES    C DIFF AG + TOXIN NEGATIVE           Thank you for allowing us to participate in the care of this patient. Please call with questions. Electronically signed by Sun Jarquin MD on 8/13/2019 at 9:40 AM      [facility]  Sun Jarquin MD  [employer] messaging  OFFICE: [phone]  CELL:     [phone]    This note is created with the assistance of a speech recognition program.  While intending to generate a document that actually reflects the content of the visit, the document can still have some errors including those of syntax and sound a like substitutions which may escape proof reading. It such instances, actual meaning can be extrapolated by contextual diversion.

## 2019-08-13 NOTE — PROGRESS NOTES
pain  Active Problems:    Urinary tract infection    Sepsis (HCC)    Small bowel obstruction (HCC)    Hydrocephalus    Movement disorder    GERD (gastroesophageal reflux disease)    Cerebral palsy, athetoid (HCC)    Tobacco abuse    H/O small bowel obstruction    Fecal impaction (HCC)    Hyperglycemia    Chronic idiopathic constipation    Septicemia ([facility] [address]  Resolved Problems:    GI bleed    S/P left colectomy with colostomy  Post op ileus    Plan  1. Strict NPO  2. Keep NGT to LIS  3. IV fluids/TPN  4. Repeat labs in AM  5.  PT/OT, us as tolerated        Anna Barreto, DO [address]

## 2019-08-13 NOTE — FLOWSHEET NOTE
Writer visits to attempt conmversation with patient about possible naming of Durable Power of  for Healthcare. (per chart, patient has a friend in [place] listed as a contact, but no POA.)    Patient sleeping, and does not rouse. Writer will attempt again later this evening.        08/13/19 1552   Encounter Summary   Services provided to: Patient   Referral/Consult From: Other    Continue Visiting   (8/13/19)   Complexity of Encounter Low   Length of Encounter 15 minutes   Routine   Type Follow up   Assessment Sleeping

## 2019-08-13 NOTE — PROGRESS NOTES
primary encounter diagnosis was Septicemia ([facility] [address] Diagnoses of Urinary tract infection without hematuria, site unspecified and Fecal impaction [facility]) were also pertinent to this visit. has a past medical history of Cerebral palsy ([facility] [address] Cerebral palsy, athetoid ([facility] [address] Constipation, GERD (gastroesophageal reflux disease), Headache, Hydrocephalus, Movement disorder, Psychiatric problem, Small bowel obstruction due to adhesions ([facility] [address] and TIA (transient ischemic attack). has a past surgical history that includes Cholecystectomy; Eye surgery (Right); Testicle surgery (Left); Urethra surgery; Abdomen surgery (08/09/2019); colostomy (08/09/2019); Appendectomy (08/09/2019); and Small intestine surgery (N/A, 8/9/2019).     Restrictions  Restrictions/Precautions  Required Braces or Orthoses?: Yes  Required Braces or Orthoses  Other: Abdominal Binder(not on & RN stated wound is stable & binder not needed)  Position Activity Restriction  Other position/activity restrictions: up in chair & as tolerated, contact isolation, NGT, telemetry  Vision/Hearing  Vision: Impaired  Hearing: Exceptions to [facility]  Hearing Exceptions: Hard of hearing/hearing concerns     Subjective  General  Chart Reviewed: Yes  Patient assessed for rehabilitation services?: Yes  Additional Pertinent Hx: CP, movement disorder, TIA  Pain Screening  Patient Currently in Pain: Yes  Vital Signs  BP Location: Right leg  Level of Consciousness: Alert  Patient Currently in Pain: Yes       Orientation  Orientation  Overall Orientation Status: Impaired  Orientation Level: Disoriented to place;Oriented to person;Oriented to situation;Disoriented to time  Social/Functional History  Social/Functional History  Lives With: Alone(staff )  Type of Home: Facility  Home Layout: One level  Home Access: Level entry  Bathroom Shower/Tub: Walk-in shower, Shower chair with back  Bathroom Toilet: Standard  Bathroom Equipment: Grab bars in shower  Home Equipment: Wheelchair-electric(W/c is tilted to adjust to his flexed posture)  ADL Assistance: [facility]: (staff does cooking and cleaning)  Ambulation Assistance: (uses W/C, only \"slithers from bed to W/C)  Transfer Assistance: Independent(pt moves from bed to chair only)  Active : No  Additional Comments: pt sleeps in hospital bed.           Objective          AROM RLE (degrees)  RLE General AROM: keeps hips/knees in flexion & ankles PF, allows little movement  AROM LLE (degrees)  LLE General AROM: keeps hips/knees in flexion & ankles PF, allows little movement  PROM RUE (degrees)  RUE General PROM: shoulder elbow WFL but hand wrist in flexion  AROM RUE (degrees)  RUE General AROM: keeps hips/knees in flexion & allows little movement  AROM LUE (degrees)  LUE General AROM: keeps elbow/wrist & hand in flexion & has very little movement tolerance  Spine  Thoracic: spine rotated to left  Lumbar: spine rotated to left  Strength RLE  Comment: SAMUEL due to fixed flexion & resistant to any movement or MMT  Strength LLE  Comment: SAMUEL due to fixed flexion & resistant to any movement or MMT  Strength RUE  Strength RUE: WFL(shoulder/elbow)  Strength LUE  Comment: SAMUEL due to fixed flexion & resistant to any movement or MMT  Tone RLE  RLE Tone: Hypertonic  Tone LLE  LLE Tone: Hypertonic  Sensation  Overall Sensation Status: WFL  Bed mobility  Rolling to Left: Maximum assistance  Rolling to Right: Maximum assistance  Supine to Sit: Maximum assistance;2 Person assistance  Sit to Supine: Maximum assistance;2 Person assistance  Scooting: Maximal assistance;2 Person assistance  Comment: cues & 2maxA for all mobility but rolling to left, needed extended time to complete for tolerance & multiple line management   Pt sat at EOB for 12 minutes, N/A to attempt transfer to chair    Transfers  Sit to Stand: Dependent/Total  Stand to sit: Dependent/Total  Comment: pt is dependent/toatal at this time & N/A to mobilize to

## 2019-08-14 LAB
ABSOLUTE EOS #: 0.45 K/UL (ref 0–0.44)
ABSOLUTE IMMATURE GRANULOCYTE: 0.3 K/UL (ref 0–0.3)
ABSOLUTE LYMPH #: 3.28 K/UL (ref 1.1–3.7)
ABSOLUTE MONO #: 1.94 K/UL (ref 0.1–1.2)
ALBUMIN SERPL-MCNC: 2 G/DL (ref 3.5–5.2)
ALBUMIN/GLOBULIN RATIO: ABNORMAL (ref 1–2.5)
ALP BLD-CCNC: 47 U/L (ref 40–129)
ALT SERPL-CCNC: 13 U/L (ref 5–41)
ANION GAP SERPL CALCULATED.3IONS-SCNC: 9 MMOL/L (ref 9–17)
AST SERPL-CCNC: 19 U/L
BASOPHILS # BLD: 0 % (ref 0–2)
BASOPHILS ABSOLUTE: 0 K/UL (ref 0–0.2)
BILIRUB SERPL-MCNC: 0.28 MG/DL (ref 0.3–1.2)
BILIRUBIN DIRECT: 0.09 MG/DL
BILIRUBIN, INDIRECT: 0.19 MG/DL (ref 0–1)
BUN BLDV-MCNC: 19 MG/DL (ref 6–20)
BUN/CREAT BLD: 34 (ref 9–20)
CALCIUM SERPL-MCNC: 7.6 MG/DL (ref 8.6–10.4)
CHLORIDE BLD-SCNC: 100 MMOL/L (ref 98–107)
CO2: 30 MMOL/L (ref 20–31)
CREAT SERPL-MCNC: 0.56 MG/DL (ref 0.7–1.2)
CULTURE: NORMAL
CULTURE: NORMAL
DIFFERENTIAL TYPE: ABNORMAL
EOSINOPHILS RELATIVE PERCENT: 3 % (ref 1–4)
GFR AFRICAN AMERICAN: >60 ML/MIN
GFR NON-AFRICAN AMERICAN: >60 ML/MIN
GFR SERPL CREATININE-BSD FRML MDRD: ABNORMAL ML/MIN/{1.73_M2}
GFR SERPL CREATININE-BSD FRML MDRD: ABNORMAL ML/MIN/{1.73_M2}
GLOBULIN: ABNORMAL G/DL (ref 1.5–3.8)
GLUCOSE BLD-MCNC: 100 MG/DL (ref 75–110)
GLUCOSE BLD-MCNC: 101 MG/DL (ref 75–110)
GLUCOSE BLD-MCNC: 101 MG/DL (ref 75–110)
GLUCOSE BLD-MCNC: 107 MG/DL (ref 75–110)
GLUCOSE BLD-MCNC: 109 MG/DL (ref 70–99)
GLUCOSE BLD-MCNC: 116 MG/DL (ref 75–110)
HCT VFR BLD CALC: 36.8 % (ref 40.7–50.3)
HEMOGLOBIN: 11.9 G/DL (ref 13–17)
IMMATURE GRANULOCYTES: 2 %
LACTIC ACID: 1.5 MMOL/L (ref 0.5–2.2)
LYMPHOCYTES # BLD: 22 % (ref 24–43)
Lab: NORMAL
Lab: NORMAL
MAGNESIUM: 1.8 MG/DL (ref 1.6–2.6)
MCH RBC QN AUTO: 29.8 PG (ref 25.2–33.5)
MCHC RBC AUTO-ENTMCNC: 32.3 G/DL (ref 28.4–34.8)
MCV RBC AUTO: 92 FL (ref 82.6–102.9)
MONOCYTES # BLD: 13 % (ref 3–12)
NRBC AUTOMATED: 0 PER 100 WBC
PDW BLD-RTO: 13.2 % (ref 11.8–14.4)
PLATELET # BLD: 271 K/UL (ref 138–453)
PLATELET ESTIMATE: ABNORMAL
PMV BLD AUTO: 9.5 FL (ref 8.1–13.5)
POTASSIUM SERPL-SCNC: 3.1 MMOL/L (ref 3.7–5.3)
POTASSIUM SERPL-SCNC: 3.9 MMOL/L (ref 3.7–5.3)
RBC # BLD: 4 M/UL (ref 4.21–5.77)
RBC # BLD: ABNORMAL 10*6/UL
SEG NEUTROPHILS: 60 % (ref 36–65)
SEGMENTED NEUTROPHILS ABSOLUTE COUNT: 8.93 K/UL (ref 1.5–8.1)
SODIUM BLD-SCNC: 139 MMOL/L (ref 135–144)
SPECIMEN DESCRIPTION: NORMAL
SPECIMEN DESCRIPTION: NORMAL
TOTAL PROTEIN: 4.1 G/DL (ref 6.4–8.3)
WBC # BLD: 14.9 K/UL (ref 3.5–11.3)
WBC # BLD: ABNORMAL 10*3/UL

## 2019-08-14 PROCEDURE — 83605 ASSAY OF LACTIC ACID: CPT

## 2019-08-14 PROCEDURE — 83735 ASSAY OF MAGNESIUM: CPT

## 2019-08-14 PROCEDURE — 84132 ASSAY OF SERUM POTASSIUM: CPT

## 2019-08-14 PROCEDURE — 2580000003 HC RX 258: Performed by: SURGERY

## 2019-08-14 PROCEDURE — 6360000002 HC RX W HCPCS: Performed by: NURSE PRACTITIONER

## 2019-08-14 PROCEDURE — 6360000002 HC RX W HCPCS: Performed by: SURGERY

## 2019-08-14 PROCEDURE — 97530 THERAPEUTIC ACTIVITIES: CPT

## 2019-08-14 PROCEDURE — 97166 OT EVAL MOD COMPLEX 45 MIN: CPT

## 2019-08-14 PROCEDURE — 99232 SBSQ HOSP IP/OBS MODERATE 35: CPT | Performed by: INTERNAL MEDICINE

## 2019-08-14 PROCEDURE — 36415 COLL VENOUS BLD VENIPUNCTURE: CPT

## 2019-08-14 PROCEDURE — 6360000002 HC RX W HCPCS: Performed by: INTERNAL MEDICINE

## 2019-08-14 PROCEDURE — C9113 INJ PANTOPRAZOLE SODIUM, VIA: HCPCS | Performed by: SURGERY

## 2019-08-14 PROCEDURE — 6370000000 HC RX 637 (ALT 250 FOR IP): Performed by: SURGERY

## 2019-08-14 PROCEDURE — 80076 HEPATIC FUNCTION PANEL: CPT

## 2019-08-14 PROCEDURE — 2580000003 HC RX 258: Performed by: INTERNAL MEDICINE

## 2019-08-14 PROCEDURE — 82947 ASSAY GLUCOSE BLOOD QUANT: CPT

## 2019-08-14 PROCEDURE — 2500000003 HC RX 250 WO HCPCS: Performed by: INTERNAL MEDICINE

## 2019-08-14 PROCEDURE — 99233 SBSQ HOSP IP/OBS HIGH 50: CPT | Performed by: INTERNAL MEDICINE

## 2019-08-14 PROCEDURE — 2000000000 HC ICU R&B

## 2019-08-14 PROCEDURE — 85025 COMPLETE CBC W/AUTO DIFF WBC: CPT

## 2019-08-14 PROCEDURE — 80048 BASIC METABOLIC PNL TOTAL CA: CPT

## 2019-08-14 RX ORDER — POTASSIUM CHLORIDE 7.45 MG/ML
10 INJECTION INTRAVENOUS PRN
Status: DISCONTINUED | OUTPATIENT
Start: 2019-08-14 | End: 2019-08-21 | Stop reason: HOSPADM

## 2019-08-14 RX ORDER — METOCLOPRAMIDE HYDROCHLORIDE 5 MG/ML
5 INJECTION INTRAMUSCULAR; INTRAVENOUS EVERY 6 HOURS
Status: DISCONTINUED | OUTPATIENT
Start: 2019-08-14 | End: 2019-08-21 | Stop reason: HOSPADM

## 2019-08-14 RX ADMIN — MORPHINE SULFATE 4 MG: 4 INJECTION INTRAVENOUS at 17:19

## 2019-08-14 RX ADMIN — ANTI-FUNGAL POWDER MICONAZOLE NITRATE TALC FREE: 1.42 POWDER TOPICAL at 20:50

## 2019-08-14 RX ADMIN — NYSTATIN: 100000 CREAM TOPICAL at 20:50

## 2019-08-14 RX ADMIN — I.V. FAT EMULSION 100 ML: 20 EMULSION INTRAVENOUS at 17:21

## 2019-08-14 RX ADMIN — ANTI-FUNGAL POWDER MICONAZOLE NITRATE TALC FREE: 1.42 POWDER TOPICAL at 00:50

## 2019-08-14 RX ADMIN — MORPHINE SULFATE 4 MG: 4 INJECTION INTRAVENOUS at 12:57

## 2019-08-14 RX ADMIN — POTASSIUM CHLORIDE 10 MEQ: 7.46 INJECTION, SOLUTION INTRAVENOUS at 12:56

## 2019-08-14 RX ADMIN — MORPHINE SULFATE 4 MG: 4 INJECTION INTRAVENOUS at 02:27

## 2019-08-14 RX ADMIN — CALCIUM GLUCONATE: 94 INJECTION, SOLUTION INTRAVENOUS at 17:21

## 2019-08-14 RX ADMIN — Medication 10 ML: at 20:50

## 2019-08-14 RX ADMIN — Medication 10 ML: at 08:32

## 2019-08-14 RX ADMIN — ANTI-FUNGAL POWDER MICONAZOLE NITRATE TALC FREE: 1.42 POWDER TOPICAL at 08:31

## 2019-08-14 RX ADMIN — POTASSIUM CHLORIDE 10 MEQ: 7.46 INJECTION, SOLUTION INTRAVENOUS at 11:35

## 2019-08-14 RX ADMIN — NYSTATIN: 100000 CREAM TOPICAL at 08:31

## 2019-08-14 RX ADMIN — MORPHINE SULFATE 4 MG: 4 INJECTION INTRAVENOUS at 04:37

## 2019-08-14 RX ADMIN — METOCLOPRAMIDE 5 MG: 5 INJECTION, SOLUTION INTRAMUSCULAR; INTRAVENOUS at 14:57

## 2019-08-14 RX ADMIN — MORPHINE SULFATE 4 MG: 4 INJECTION INTRAVENOUS at 23:46

## 2019-08-14 RX ADMIN — SODIUM CHLORIDE, POTASSIUM CHLORIDE, SODIUM LACTATE AND CALCIUM CHLORIDE: 600; 310; 30; 20 INJECTION, SOLUTION INTRAVENOUS at 08:31

## 2019-08-14 RX ADMIN — PANTOPRAZOLE SODIUM 40 MG: 40 INJECTION, POWDER, FOR SOLUTION INTRAVENOUS at 08:32

## 2019-08-14 RX ADMIN — PIPERACILLIN SODIUM,TAZOBACTAM SODIUM 3.38 G: 3; .375 INJECTION, POWDER, FOR SOLUTION INTRAVENOUS at 10:18

## 2019-08-14 RX ADMIN — PIPERACILLIN SODIUM,TAZOBACTAM SODIUM 3.38 G: 3; .375 INJECTION, POWDER, FOR SOLUTION INTRAVENOUS at 17:41

## 2019-08-14 RX ADMIN — METOCLOPRAMIDE 5 MG: 5 INJECTION, SOLUTION INTRAMUSCULAR; INTRAVENOUS at 20:50

## 2019-08-14 RX ADMIN — PIPERACILLIN SODIUM,TAZOBACTAM SODIUM 3.38 G: 3; .375 INJECTION, POWDER, FOR SOLUTION INTRAVENOUS at 02:27

## 2019-08-14 RX ADMIN — NYSTATIN: 100000 CREAM TOPICAL at 00:50

## 2019-08-14 RX ADMIN — MORPHINE SULFATE 4 MG: 4 INJECTION INTRAVENOUS at 08:35

## 2019-08-14 RX ADMIN — POTASSIUM CHLORIDE 10 MEQ: 7.46 INJECTION, SOLUTION INTRAVENOUS at 10:18

## 2019-08-14 RX ADMIN — POTASSIUM CHLORIDE 10 MEQ: 7.46 INJECTION, SOLUTION INTRAVENOUS at 09:16

## 2019-08-14 ASSESSMENT — PAIN DESCRIPTION - DESCRIPTORS
DESCRIPTORS: ACHING
DESCRIPTORS: ACHING
DESCRIPTORS: SHARP

## 2019-08-14 ASSESSMENT — PAIN DESCRIPTION - ORIENTATION
ORIENTATION: MID

## 2019-08-14 ASSESSMENT — PAIN SCALES - GENERAL
PAINLEVEL_OUTOF10: 7
PAINLEVEL_OUTOF10: 7
PAINLEVEL_OUTOF10: 8
PAINLEVEL_OUTOF10: 6
PAINLEVEL_OUTOF10: 9
PAINLEVEL_OUTOF10: 10
PAINLEVEL_OUTOF10: 7

## 2019-08-14 ASSESSMENT — PAIN DESCRIPTION - LOCATION
LOCATION: ABDOMEN

## 2019-08-14 ASSESSMENT — PAIN DESCRIPTION - PAIN TYPE
TYPE: SURGICAL PAIN

## 2019-08-14 ASSESSMENT — PAIN DESCRIPTION - FREQUENCY
FREQUENCY: CONTINUOUS

## 2019-08-14 ASSESSMENT — PAIN DESCRIPTION - ONSET
ONSET: ON-GOING

## 2019-08-14 ASSESSMENT — PAIN - FUNCTIONAL ASSESSMENT
PAIN_FUNCTIONAL_ASSESSMENT: PREVENTS OR INTERFERES SOME ACTIVE ACTIVITIES AND ADLS
PAIN_FUNCTIONAL_ASSESSMENT: ACTIVITIES ARE NOT PREVENTED
PAIN_FUNCTIONAL_ASSESSMENT: ACTIVITIES ARE NOT PREVENTED

## 2019-08-14 ASSESSMENT — PAIN DESCRIPTION - PROGRESSION
CLINICAL_PROGRESSION: NOT CHANGED
CLINICAL_PROGRESSION: GRADUALLY IMPROVING

## 2019-08-14 NOTE — PROGRESS NOTES
[facility] - Progress [facility]    2019   3:50 PM    Name:  Giselle Adams  :    1975  Age:  53 y.o. male  MRN:    [id number]     Acct:     [de-identified]   Room:  [address] [place] [address] Day: [address]: [facility] [place] Date: 2019  9:42 AM  PCP: Nickie Mojica     Subjective:     C/C:   Chief Complaint   Patient presents with    Altered Mental Status     this am     Tachycardia     rate 140       Interval History: Status: Improving. There is scant drainage out of the NG tube at this point, 50 mL's this shift. There is been no further emesis. He denies nausea. He has been started on Reglan and is continued on Zosyn. He is resting comfortably. His pain appears to be controlled. His blood pressure is stable although slightly low at this time. Heart rate is 91. Procalcitonin was elevated at 1.41 and lactic acid was elevated at 3.5. Lactic acid is now returned to normal once again. Potassium has been replaced. He is POD #5 exploratory laparotomy, left colectomy with colostomy and appendectomy. Surgical sites appear clean and dry. White count has decreased to 14.9. Blood cultures are ordered as is a UA with CNS. Vancomycin has been discontinued. ID consultation does not believe there is significant urinary tract infection, pneumonia or sepsis however we will continue antibiotics at this time pending cultures. History: 76-year-old  male with known history of cerebral palsy and hydrocephalus presents with change in mental status and tachycardia to heart rate of 140. He was admitted to the hospital for management of sepsis related to urinary tract infection and fecal impaction. Additional symptomatology included a large episode of black stools, nausea and vomiting.   The patient relates chronic constipation for 2 to 3 months and generalized abdominal pain however on the day of admission his pain became 10/10 and he was transported to the emergency 8/8/2019 Yes    Fecal impaction ([facility] [address] 8/9/2019 Yes    Hyperglycemia 8/8/2019 Yes    Chronic idiopathic constipation 8/9/2019 Yes    Septicemia ([facility] [address] 8/9/2019 Yes    Leukemoid reaction 8/13/2019 Yes        Past Medical History:   Diagnosis Date    Cerebral palsy ([facility] [address]     Cerebral palsy, athetoid (HCC)     Constipation     GERD (gastroesophageal reflux disease)     Headache     Hydrocephalus     Movement disorder     Psychiatric problem     Small bowel obstruction due to adhesions (HCC)     TIA (transient ischemic attack)         Consultations:     IP CONSULT TO INTERNAL MEDICINE  IP CONSULT TO GI  IP CONSULT TO CRITICAL CARE  IP CONSULT TO GENERAL SURGERY  IP CONSULT TO UROLOGY  IP CONSULT TO SPIRITUAL SERVICES  IP CONSULT TO CASE MANAGEMENT  IP CONSULT TO SOCIAL WORK  IP CONSULT TO DIETITIAN  IP CONSULT TO DIETITIAN  IP CONSULT TO INFECTIOUS DISEASES    Plan:     1. Continue Zosyn  2. Blood cultures  3. Urine culture  4. NG tube to low intermittent suction  5. Begin Reglan  6. N.p.o.  7. Patient is currently off all oral medications, monitor for any seizure activity  8. ID consultation  9. GI prophylaxis  10. DVT prophylaxis  11. Recheck laboratories in the morning  12. Pulmonary critical care consultation has been reviewed  13.  Pain control with Toradol 50 mg every 6 hours IV as needed      Electronically signed by Jennifer Weems DO on 8/14/2019 at 3:50 PM

## 2019-08-14 NOTE — PROGRESS NOTES
athetoid ([facility] [address] Constipation, GERD (gastroesophageal reflux disease), Headache, Hydrocephalus, Movement disorder, Psychiatric problem, Small bowel obstruction due to adhesions ([address] and TIA (transient ischemic attack). has a past surgical history that includes Cholecystectomy; Eye surgery (Right); Testicle surgery (Left); Urethra surgery; Abdomen surgery (08/09/2019); colostomy (08/09/2019); Appendectomy (08/09/2019); and Small intestine surgery (N/A, 8/9/2019). PER H&P: The patient is a 53 y.o. Non-/non  male who presents with Altered Mental Status (this am ) and Tachycardia (rate 140)   and he is admitted to the hospital for the management of sepsis related to UTI and fecal impaction. The patient was sent via ambulance to be evaluated for decreased LOC, a large black stool and nausea and vomiting per [facility]. The patient states he has been dealing with constipation for the last 2 or 3 months and generalized abdominal pain but today he states his pain became a 10/10 and he could not take it anymore. His pain is diffuse across his abdomen. He states he has been feeling sick for a few days. During my assessment he reports that he has been diaphoretic, short of breath, he has had abdominal distention along with pain, difficulty urinating, and weakness. Related to his cerebral palsy he is mostly wheelchair-bound with contractures to his left upper extremity and bilateral lower extremities. He is afebrile and normotensive when he arrived to the ER but he is tachycardic. Initial lactic is 3.9, his WBCs are 22.3, his urine is positive for nitrates, and the CT of the abdomen reports markedly dilated rectosigmoid colon with mild dilatation of the remaining colon, there is a large amount of stool within the rectosigmoid colon with more focally higher density area within the rectum likely more focal stool impaction rather than a mass however an obstruction could not be ruled out.   CT of the

## 2019-08-14 NOTE — PROGRESS NOTES
Physical Therapy  Facility/Department: [facility] ICU  Daily Treatment Note  NAME: Rain Babcock  : 1975  MRN: [id number]    Date of Service: 2019    Discharge Recommendations:  Subacute/Skilled Nursing Facility        Assessment   Assessment: pt continues to have poor sitting balance & requires max to dependent assist for functional mobility,  & N/A to be OOB due to flexed posture with history of CP & his adaptive W/C is needed for toelrance of OOB & presently dependent for any standing at present. Pt is appropriate to D/C to SNF to increase independence with functional mobility, balance, safety awareness & activity tolerance. Prognosis: Good  Decision Making: High Complexity  Exam: ROM, MMT, functional mobility, activity tolerance, Balance, & [employer] AM-PAC 6 Clicks Basic Mobility   Clinical Presentation: evolving  REQUIRES PT FOLLOW UP: Yes  Activity Tolerance  Activity Tolerance: Patient limited by fatigue;Patient limited by endurance     Patient Diagnosis(es): The primary encounter diagnosis was Septicemia ([facility] [address] Diagnoses of Urinary tract infection without hematuria, site unspecified and Fecal impaction [facility]) were also pertinent to this visit. has a past medical history of Cerebral palsy ([address] Cerebral palsy, athetoid ([address] Constipation, GERD (gastroesophageal reflux disease), Headache, Hydrocephalus, Movement disorder, Psychiatric problem, Small bowel obstruction due to adhesions ([address] and TIA (transient ischemic attack). has a past surgical history that includes Cholecystectomy; Eye surgery (Right); Testicle surgery (Left); Urethra surgery; Abdomen surgery (2019); colostomy (2019); Appendectomy (2019); and Small intestine surgery (N/A, 2019).     Restrictions  Restrictions/Precautions  Required Braces or Orthoses?: Yes  Required Braces or Orthoses  Other: Abdominal Binder(not on & RN stated wound is stable & binder not needed)  Position Activity

## 2019-08-14 NOTE — PROGRESS NOTES
disease)    Cerebral palsy, athetoid (HCC)    Tobacco abuse    H/O small bowel obstruction    Fecal impaction (HCC)    Hyperglycemia    Chronic idiopathic constipation    Septicemia (HCC)    Leukemoid reaction  Resolved Problems:    GI bleed    S/P left colectomy    Plan  1. Await return of bowel function  2.  Up as tolerated        Jhon Soda, DO [address]

## 2019-08-14 NOTE — PROGRESS NOTES
Presumptive positive. Unable to confirm due to unavailability of reagent. *       Additional Lab/culture results:    Physical Exam: Patient still has evidence of ileus, NG tube in place, copious NG tube drainage documented by the nursing staff. Patient complaining of severe dry mouth nurses advised to provide him with a swab and possibly few ice chips if okay with general surgery, since he has an NG tube in place this should not be a problem    Interval Imaging Findings:    Impression:    Patient Active Problem List   Diagnosis    Constipation    Fatigue    Major depressive disorder, recurrent episode, severe ([address]    Urinary tract infection    Sepsis ([address]    Small bowel obstruction ([address]    Hydrocephalus    Movement disorder    GERD (gastroesophageal reflux disease)    Cerebral palsy, athetoid ([address]    Tobacco abuse    H/O small bowel obstruction    Fecal impaction (HCC)    Hyperglycemia    Generalized abdominal pain    Chronic idiopathic constipation    Septicemia ([address]    Leukemoid reaction       Plan: We will keep the Martinez catheter indwelling, monitor urinary output repeat BUN/creatinine in a.mPamela Morales  9:32 PM 8/13/2019

## 2019-08-14 NOTE — PLAN OF CARE
Continues to require pain meds for control to abdominal pain continues afebrile and sinus tachycardia
Problem: Pain:  Goal: Pain level will decrease  Description  Pain level will decrease  8/14/2019 1449 by Edwin Olivia RN  Outcome: Ongoing  Note:   Patient continues to have pain to surgical incision. Patient comfort measures provided. Vital signs stable. PRN medications for pain being administered as ordered. Encouraged patient to call RN when any assistance is needed. Will continue to monitor patient. 8/14/2019 0142 by Laureen Miller RN  Outcome: Ongoing  Note:   Pain is managed with medication. Pt educated on non pharmacological pain management. Will continue to monitor for changes in pain. Problem: Falls - Risk of:  Goal: Will remain free from falls  Description  Will remain free from falls  8/14/2019 1449 by Edwin Olivia RN  Outcome: Ongoing  Note:   Patient in fall risk precautions. Bed locked and in lowest position. Side rails up x 2. Call light and bedside table within reach. Encouraged patient to call RN when any assistance is needed. Will continue to monitor patient closely. 8/14/2019 0142 by Laureen Miller RN  Outcome: Ongoing  Note:   Pt fall risk, fall band present, falling star, safety alarm activated and in use as needed. Hourly rounding performed. Pt encouraged to use call light. See Zoila Rose fall risk assessment. Problem: Risk for Impaired Skin Integrity  Goal: Tissue integrity - skin and mucous membranes  Description  Structural intactness and normal physiological function of skin and  mucous membranes. 8/14/2019 0142 by Laureen Miller RN  Outcome: Ongoing  Note:   Turn and reposition in bed q2 hours. Pressure relief overlay on mattress. Monitoring skin with every assessment and prn. Offered restroom assistance to patient q2 hours. Mepilex applied to coccyx.         Problem: Infection - Surgical Site:  Goal: Will show no infection signs and symptoms  Description  Will show no infection signs and symptoms  Outcome: Ongoing  Note:   Surgical site without any signs of
membranes. 8/11/2019 0009 by Carmen Mejia RN  Outcome: Ongoing  Note:   Skin assessment ongoing. Pressure ulcer preventions being practiced - see charting for details. Patient educated on turns every two hours, patient calling out when he wants turned. Will continue to monitor.

## 2019-08-14 NOTE — PROGRESS NOTES
Information:  · Nutrition-Focused Physical Findings: GI:  +NGT (5,350 ml output), rounded, soft, tenderness, guarding, N/V, last BM (8/8), no flatus, absent bowel sounds, +colostomy (30 ml output); PV:  BLE, trace; Skin:  pale, redness, (R) elbow/groin, incision 8/9 abd medial (c/d/i)  · Wound Type: Surgical Wound  · Current Nutrition Therapies:  · Oral Diet Orders: NPO   · Parenteral Nutrition Orders:  · Type and Formula: Premix Central   · Lipids: 100ml, Daily  · Rate/Volume: 65 / 1,560  · Duration: Continuous   · Additive Changes:  Increase NaCl 60 to 80 mEq, increase K PO4 17 to 22 mmol, increase Ca Gluconate 7 to 10 mEq, increase Mg SO4 8 to 11 mEq & add MVI/Trace Elements. · Current PN Order Provides: See below.   · Goal PN Orders Provides: @ 65 ml/hr (goal):  1,573 kcal, 78 g protein  · Additional Calories: None  · Anthropometric Measures:  · Ht: 5' 5\" (165.1 cm)   · Admission Body Wt: 123 lb 14.4 oz (56.2 kg)  · Usual Body Wt: (None)  · % Weight Change:  Unknown  · Ideal Body Wt: 147 lb 11.3 oz (67 kg), % Ideal Body 84%  · BMI Classification: BMI 18.5 - 24.9 Normal Weight    Nutrition Interventions:   Continue NPO, Modify current Parenteral Nutrition  Continued Inpatient Monitoring, Coordination of Care    Nutrition Evaluation:   · Evaluation: Progressing toward goals   · Goals: PN intake to meet >80% of estimated kcal/protein needs   · Monitoring: PN Intake, PN Tolerance, Skin Integrity, Wound Healing, I&O, Mental Status/Confusion, Weight, Pertinent Labs, Nausea or Vomiting, Constipation, Monitor Bowel Function      Electronically signed by Henry Arana, NICHOLAS, GEE on 8/14/19 at 4:47 PM    Contact Number: [phone]

## 2019-08-15 ENCOUNTER — APPOINTMENT (OUTPATIENT)
Dept: GENERAL RADIOLOGY | Age: 44
DRG: 854 | End: 2019-08-15
Payer: MEDICARE

## 2019-08-15 PROBLEM — E44.0 MODERATE MALNUTRITION (HCC): Status: ACTIVE | Noted: 2019-08-15

## 2019-08-15 LAB
ABSOLUTE EOS #: 0.38 K/UL (ref 0–0.44)
ABSOLUTE IMMATURE GRANULOCYTE: 0.23 K/UL (ref 0–0.3)
ABSOLUTE LYMPH #: 2.03 K/UL (ref 1.1–3.7)
ABSOLUTE MONO #: 1.17 K/UL (ref 0.1–1.2)
ANION GAP SERPL CALCULATED.3IONS-SCNC: 9 MMOL/L (ref 9–17)
BASOPHILS # BLD: 0 % (ref 0–2)
BASOPHILS ABSOLUTE: 0.03 K/UL (ref 0–0.2)
BUN BLDV-MCNC: 13 MG/DL (ref 6–20)
BUN/CREAT BLD: 31 (ref 9–20)
CALCIUM SERPL-MCNC: 7.6 MG/DL (ref 8.6–10.4)
CHLORIDE BLD-SCNC: 101 MMOL/L (ref 98–107)
CO2: 25 MMOL/L (ref 20–31)
CREAT SERPL-MCNC: 0.42 MG/DL (ref 0.7–1.2)
CULTURE: NO GROWTH
DIFFERENTIAL TYPE: ABNORMAL
EOSINOPHILS RELATIVE PERCENT: 4 % (ref 1–4)
GFR AFRICAN AMERICAN: >60 ML/MIN
GFR NON-AFRICAN AMERICAN: >60 ML/MIN
GFR SERPL CREATININE-BSD FRML MDRD: ABNORMAL ML/MIN/{1.73_M2}
GFR SERPL CREATININE-BSD FRML MDRD: ABNORMAL ML/MIN/{1.73_M2}
GLUCOSE BLD-MCNC: 110 MG/DL (ref 75–110)
GLUCOSE BLD-MCNC: 121 MG/DL (ref 75–110)
GLUCOSE BLD-MCNC: 124 MG/DL (ref 75–110)
GLUCOSE BLD-MCNC: 129 MG/DL (ref 70–99)
HCT VFR BLD CALC: 32.9 % (ref 40.7–50.3)
HEMOGLOBIN: 10.7 G/DL (ref 13–17)
IMMATURE GRANULOCYTES: 2 %
LYMPHOCYTES # BLD: 19 % (ref 24–43)
Lab: NORMAL
MCH RBC QN AUTO: 30.1 PG (ref 25.2–33.5)
MCHC RBC AUTO-ENTMCNC: 32.5 G/DL (ref 28.4–34.8)
MCV RBC AUTO: 92.7 FL (ref 82.6–102.9)
MONOCYTES # BLD: 11 % (ref 3–12)
NRBC AUTOMATED: 0 PER 100 WBC
PDW BLD-RTO: 13.2 % (ref 11.8–14.4)
PLATELET # BLD: 236 K/UL (ref 138–453)
PLATELET ESTIMATE: ABNORMAL
PMV BLD AUTO: 9.4 FL (ref 8.1–13.5)
POTASSIUM SERPL-SCNC: 3.7 MMOL/L (ref 3.7–5.3)
PROCALCITONIN: 0.36 NG/ML
RBC # BLD: 3.55 M/UL (ref 4.21–5.77)
RBC # BLD: ABNORMAL 10*6/UL
SEG NEUTROPHILS: 64 % (ref 36–65)
SEGMENTED NEUTROPHILS ABSOLUTE COUNT: 7.09 K/UL (ref 1.5–8.1)
SODIUM BLD-SCNC: 135 MMOL/L (ref 135–144)
SPECIMEN DESCRIPTION: NORMAL
TRIGL SERPL-MCNC: 125 MG/DL
WBC # BLD: 10.9 K/UL (ref 3.5–11.3)
WBC # BLD: ABNORMAL 10*3/UL

## 2019-08-15 PROCEDURE — 94761 N-INVAS EAR/PLS OXIMETRY MLT: CPT

## 2019-08-15 PROCEDURE — 6360000002 HC RX W HCPCS: Performed by: INTERNAL MEDICINE

## 2019-08-15 PROCEDURE — 2580000003 HC RX 258: Performed by: SURGERY

## 2019-08-15 PROCEDURE — 71045 X-RAY EXAM CHEST 1 VIEW: CPT

## 2019-08-15 PROCEDURE — 6360000002 HC RX W HCPCS: Performed by: SURGERY

## 2019-08-15 PROCEDURE — 2500000003 HC RX 250 WO HCPCS: Performed by: INTERNAL MEDICINE

## 2019-08-15 PROCEDURE — 74018 RADEX ABDOMEN 1 VIEW: CPT

## 2019-08-15 PROCEDURE — 99232 SBSQ HOSP IP/OBS MODERATE 35: CPT | Performed by: INTERNAL MEDICINE

## 2019-08-15 PROCEDURE — 6370000000 HC RX 637 (ALT 250 FOR IP): Performed by: SURGERY

## 2019-08-15 PROCEDURE — 2000000000 HC ICU R&B

## 2019-08-15 PROCEDURE — 84145 PROCALCITONIN (PCT): CPT

## 2019-08-15 PROCEDURE — 36415 COLL VENOUS BLD VENIPUNCTURE: CPT

## 2019-08-15 PROCEDURE — 2580000003 HC RX 258: Performed by: INTERNAL MEDICINE

## 2019-08-15 PROCEDURE — 84478 ASSAY OF TRIGLYCERIDES: CPT

## 2019-08-15 PROCEDURE — C9113 INJ PANTOPRAZOLE SODIUM, VIA: HCPCS | Performed by: SURGERY

## 2019-08-15 PROCEDURE — 80048 BASIC METABOLIC PNL TOTAL CA: CPT

## 2019-08-15 PROCEDURE — 85025 COMPLETE CBC W/AUTO DIFF WBC: CPT

## 2019-08-15 PROCEDURE — 82947 ASSAY GLUCOSE BLOOD QUANT: CPT

## 2019-08-15 RX ORDER — ALVIMOPAN 12 MG/1
12 CAPSULE ORAL 2 TIMES DAILY
Status: DISCONTINUED | OUTPATIENT
Start: 2019-08-15 | End: 2019-08-21 | Stop reason: HOSPADM

## 2019-08-15 RX ADMIN — NYSTATIN: 100000 CREAM TOPICAL at 21:00

## 2019-08-15 RX ADMIN — MORPHINE SULFATE 4 MG: 4 INJECTION INTRAVENOUS at 09:24

## 2019-08-15 RX ADMIN — SODIUM CHLORIDE, POTASSIUM CHLORIDE, SODIUM LACTATE AND CALCIUM CHLORIDE: 600; 310; 30; 20 INJECTION, SOLUTION INTRAVENOUS at 08:27

## 2019-08-15 RX ADMIN — ALVIMOPAN 12 MG: 12 CAPSULE ORAL at 09:24

## 2019-08-15 RX ADMIN — MORPHINE SULFATE 4 MG: 4 INJECTION INTRAVENOUS at 02:34

## 2019-08-15 RX ADMIN — ONDANSETRON 4 MG: 2 INJECTION INTRAMUSCULAR; INTRAVENOUS at 09:23

## 2019-08-15 RX ADMIN — CALCIUM GLUCONATE: 94 INJECTION, SOLUTION INTRAVENOUS at 18:01

## 2019-08-15 RX ADMIN — METOCLOPRAMIDE 5 MG: 5 INJECTION, SOLUTION INTRAMUSCULAR; INTRAVENOUS at 15:59

## 2019-08-15 RX ADMIN — MORPHINE SULFATE 4 MG: 4 INJECTION INTRAVENOUS at 12:20

## 2019-08-15 RX ADMIN — PIPERACILLIN SODIUM,TAZOBACTAM SODIUM 3.38 G: 3; .375 INJECTION, POWDER, FOR SOLUTION INTRAVENOUS at 09:24

## 2019-08-15 RX ADMIN — METOCLOPRAMIDE 5 MG: 5 INJECTION, SOLUTION INTRAMUSCULAR; INTRAVENOUS at 09:23

## 2019-08-15 RX ADMIN — ALVIMOPAN 12 MG: 12 CAPSULE ORAL at 21:00

## 2019-08-15 RX ADMIN — Medication 10 ML: at 09:24

## 2019-08-15 RX ADMIN — PIPERACILLIN SODIUM,TAZOBACTAM SODIUM 3.38 G: 3; .375 INJECTION, POWDER, FOR SOLUTION INTRAVENOUS at 02:27

## 2019-08-15 RX ADMIN — METOCLOPRAMIDE 5 MG: 5 INJECTION, SOLUTION INTRAMUSCULAR; INTRAVENOUS at 21:19

## 2019-08-15 RX ADMIN — ANTI-FUNGAL POWDER MICONAZOLE NITRATE TALC FREE: 1.42 POWDER TOPICAL at 09:24

## 2019-08-15 RX ADMIN — PANTOPRAZOLE SODIUM 40 MG: 40 INJECTION, POWDER, FOR SOLUTION INTRAVENOUS at 09:24

## 2019-08-15 RX ADMIN — NYSTATIN: 100000 CREAM TOPICAL at 09:24

## 2019-08-15 RX ADMIN — I.V. FAT EMULSION 100 ML: 20 EMULSION INTRAVENOUS at 18:01

## 2019-08-15 RX ADMIN — MORPHINE SULFATE 4 MG: 4 INJECTION INTRAVENOUS at 21:19

## 2019-08-15 RX ADMIN — METOCLOPRAMIDE 5 MG: 5 INJECTION, SOLUTION INTRAMUSCULAR; INTRAVENOUS at 02:27

## 2019-08-15 RX ADMIN — MORPHINE SULFATE 4 MG: 4 INJECTION INTRAVENOUS at 16:02

## 2019-08-15 RX ADMIN — ANTI-FUNGAL POWDER MICONAZOLE NITRATE TALC FREE: 1.42 POWDER TOPICAL at 21:00

## 2019-08-15 RX ADMIN — Medication 10 ML: at 21:19

## 2019-08-15 RX ADMIN — MORPHINE SULFATE 4 MG: 4 INJECTION INTRAVENOUS at 23:47

## 2019-08-15 RX ADMIN — ONDANSETRON 4 MG: 2 INJECTION INTRAMUSCULAR; INTRAVENOUS at 05:09

## 2019-08-15 ASSESSMENT — PAIN DESCRIPTION - LOCATION
LOCATION: ABDOMEN
LOCATION: ABDOMEN

## 2019-08-15 ASSESSMENT — PAIN DESCRIPTION - FREQUENCY
FREQUENCY: CONTINUOUS
FREQUENCY: CONTINUOUS

## 2019-08-15 ASSESSMENT — PAIN DESCRIPTION - ONSET
ONSET: ON-GOING
ONSET: ON-GOING

## 2019-08-15 ASSESSMENT — ENCOUNTER SYMPTOMS
RESPIRATORY NEGATIVE: 1
ABDOMINAL PAIN: 1

## 2019-08-15 ASSESSMENT — PAIN DESCRIPTION - PROGRESSION
CLINICAL_PROGRESSION: GRADUALLY IMPROVING
CLINICAL_PROGRESSION: NOT CHANGED

## 2019-08-15 ASSESSMENT — PAIN SCALES - GENERAL
PAINLEVEL_OUTOF10: 8
PAINLEVEL_OUTOF10: 7
PAINLEVEL_OUTOF10: 8
PAINLEVEL_OUTOF10: 7

## 2019-08-15 ASSESSMENT — PAIN DESCRIPTION - PAIN TYPE
TYPE: SURGICAL PAIN
TYPE: SURGICAL PAIN

## 2019-08-15 ASSESSMENT — PAIN DESCRIPTION - DESCRIPTORS
DESCRIPTORS: ACHING
DESCRIPTORS: ACHING

## 2019-08-15 ASSESSMENT — PAIN DESCRIPTION - ORIENTATION
ORIENTATION: MID
ORIENTATION: MID

## 2019-08-15 ASSESSMENT — PAIN - FUNCTIONAL ASSESSMENT
PAIN_FUNCTIONAL_ASSESSMENT: PREVENTS OR INTERFERES SOME ACTIVE ACTIVITIES AND ADLS
PAIN_FUNCTIONAL_ASSESSMENT: PREVENTS OR INTERFERES SOME ACTIVE ACTIVITIES AND ADLS

## 2019-08-15 NOTE — PROGRESS NOTES
[facility]  Progress Note    Torie Leos  MRN: [id number]  Date: 8/15/2019    Reason for F/U :   Leukocytosis    Impression :   1. Fecal impaction-significant colon dilatation   · status post left-sided colectomy with colostomy formation 8/9/2019  2. Postoperative small bowel obstruction  3. Cerebral palsy/Hydrocephalus-chronic  4. Large hiatal hernia  5. Leukocytosis likely reactive-leukemoid reaction  6. Questionable pneumonia  7. Urinary tract infection-status post antimicrobial therapy    Recommendations:   · The patient continues to have ileus and is on NG tube suction  · The leukocytosis has resolved and again no acute signs of clinical infection and therefore I will stop the antimicrobial therapy-Zosyn today. · The plan is for continued bowel rest and NG tube suction  · We will continue to monitor his progress off antibiotics    Infection Control Recommendations:   Universal precautions    Discharge Planning:   Estimated Length of IV antimicrobials: To be determined   Patient will need Midline Catheter Insertion/ PICC line Insertion: No  Patient will need: Home IV , [place],  SNF,  LTAC: Undetermined  Patient willneed outpatient wound care: No    MedicalDecision making / Summary of Stay:   Torie Leos is a 53y.o.-year-old male who was initially admitted on 8/8/2019. [facility] he has a history of cerebral palsy and lives in a group home. He was admitted secondary to altered mental status, several episodes of nonbilious/nonbloody vomitus, diarrhea, abdominal pain with associated abdominal distention. The patient had not had a bowel movement 4 weeks and a CAT scan of the abdomen pelvis done in the emergency room showed significant enlargement of the distal colon with fecal impaction.   The patient was seen by the gastroenterology service and general surgery service and endoscopic treatment was felt to be high risk so the patient underwent exploratory laparotomy, left-sided colectomy with colostomy formation as well as an appendectomy on 2019. The patient has been seen by other consultants including the pulmonary critical care service. There is been some concern for pneumonia and urinary tract infection. The patient has been on IV antimicrobial therapy with Rocephin and metronidazole initially. The patient has been having episodes of nausea with associated emesis. The white blood cell count has been somewhat elevated though the patient has remained afebrile. The antimicrobial therapy was broadened to Zosyn and metronidazole and vancomycin was added this morning. The patient overnight did have bouts of emesis and has subsequently had an NG tube placed and it is my understanding overnight he had close to 3 L of fluid removed via the NG tube/emesis. He did have a follow-up CAT scan of the abdomen pelvis done given elevated white blood cell count as well as increasing lactic acid. Current evaluation:8/15/2019    BP (!) 103/46   Pulse 100   Temp 98.2 °F (36.8 °C) (Oral)   Resp 18   Ht 5' 5\" (1.651 m)   Wt 126 lb 3.2 oz (57.2 kg)   SpO2 100%   BMI 21.00 kg/m²     Temperature Range: Temp: 98.2 °F (36.8 °C) Temp  Av.5 °F (36.9 °C)  Min: 98.2 °F (36.8 °C)  Max: 98.7 °F (37.1 °C)  The patient is seen and evaluated at bedside he is awake and alert in no acute distress. He has an NG tube in place suctioning bilious material.  He does not have any subjective fevers or chills. He does still report some abdominal pain. Review of Systems   Constitutional: Negative. Respiratory: Negative. Cardiovascular: Negative. Gastrointestinal: Positive for abdominal pain. Genitourinary: Negative. Musculoskeletal: Negative. Neurological: Negative. Physical Examination :     Physical Exam   Constitutional: He is oriented to person, place, and time. HENT:   Head: Normocephalic and atraumatic.    NG tube in place to suction   Cardiovascular: Regular rhythm and

## 2019-08-15 NOTE — PROGRESS NOTES
Occupational Therapy      DATE: 8/15/2019    NAME: Antonio Le  MRN: [id number]   : 1975      [facility]  Occupational Therapy Not Seen Note    Patient not available for Occupational Therapy due to:    [] Testing:    [] Hemodialysis    [] Cancelled by RN:    [x]Refusal by Patient: Attempted to see pt at 10:26am, pt in bed and adamantly refused to participate in therapy.     [] Surgery:     [] Intubation:     [] Pain Medication:    [] Sedation:     [] Spine Precautions :    [] Medical Instability:    [] Other:    MATTY Lawson/JO

## 2019-08-15 NOTE — PROGRESS NOTES
CL 98 100  --  101   CO2 29 30  --  25   BUN 24* 19  --  13   CREATININE 0.44* 0.56*  --  0.42*       Recent Labs     08/13/19  1630   COLORU YELLOW   PHUR 7.0   WBCUA 5 TO 10   RBCUA 0 TO 2   MUCUS NOT REPORTED   TRICHOMONAS NOT REPORTED   YEAST NOT REPORTED   BACTERIA FEW*   SPECGRAV 1.015   LEUKOCYTESUR TRACE*   UROBILINOGEN Normal   BILIRUBINUR Presumptive positive. Unable to confirm due to unavailability of reagent. *       Additional Lab/culture results:    Physical Exam: Patient in bed, still dealing with ileus, NG tube in place with no significant colostomy output, bladder decompressed urine clear    Interval Imaging Findings:    Impression:    Patient Active Problem List   Diagnosis    Constipation    Fatigue    Major depressive disorder, recurrent episode, severe ([address]    Urinary tract infection    Sepsis ([address]    Small bowel obstruction (HCC)    Hydrocephalus    Movement disorder    GERD (gastroesophageal reflux disease)    Cerebral palsy, athetoid (HCC)    Tobacco abuse    H/O small bowel obstruction    Fecal impaction (HCC)    Hyperglycemia    Generalized abdominal pain    Chronic idiopathic constipation    Septicemia (HCC)    Leukemoid reaction    Moderate malnutrition ([address]       Plan: Maintain indwelling Martinez we will follow as needed    Arslan Oneil  7:27 PM 8/15/2019
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
## 2019-08-15 NOTE — FLOWSHEET NOTE
Patient resting in bed; engages in conversation about his melisa, his illness, death, the afterlife, and his desire to continue living. Patient appears to be doing some life review. Patient states his recent surgery \"didn't work\"; states he feels his life is coming to an end. Patient becomes tearful during conversation; expresses grief; expresses regret; expresses gratitude for writer's visit and support. Writer provides listening presence, supportive conversation, spiritual conversation, end of life conversation, grief support, and blessing. Spiritual Care will follow as needed. 08/15/19 1226   Encounter Summary   Services provided to: Patient   Referral/Consult From: Max   Continue Visiting   (8/15/19)   Complexity of Encounter Moderate   Length of Encounter 30 minutes   Routine   Type Follow up   Spiritual/Jain   Type Spiritual support   Assessment Calm; Approachable;Grieving; Anxious; Concerns with suffering   Intervention Active listening;Explored feelings, thoughts, concerns;Nurtured hope;Sustaining presence/ Ministry of presence; End of life care; Discussed death;Discussed afterlife; Discussed relationship with God;Discussed belief system/Anabaptist practices/melisa   Outcome Comfort;Expressed gratitude;Engaged in conversation;Expressed feelings/needs/concerns; Tearful;Receptive;Venting emotion

## 2019-08-16 LAB
ABSOLUTE EOS #: 0.36 K/UL (ref 0–0.44)
ABSOLUTE IMMATURE GRANULOCYTE: 0.27 K/UL (ref 0–0.3)
ABSOLUTE LYMPH #: 2.02 K/UL (ref 1.1–3.7)
ABSOLUTE MONO #: 1.26 K/UL (ref 0.1–1.2)
ANION GAP SERPL CALCULATED.3IONS-SCNC: 8 MMOL/L (ref 9–17)
BASOPHILS # BLD: 0 % (ref 0–2)
BASOPHILS ABSOLUTE: 0.05 K/UL (ref 0–0.2)
BUN BLDV-MCNC: 12 MG/DL (ref 6–20)
BUN/CREAT BLD: 28 (ref 9–20)
CALCIUM SERPL-MCNC: 7.9 MG/DL (ref 8.6–10.4)
CHLORIDE BLD-SCNC: 103 MMOL/L (ref 98–107)
CO2: 24 MMOL/L (ref 20–31)
CREAT SERPL-MCNC: 0.43 MG/DL (ref 0.7–1.2)
DIFFERENTIAL TYPE: ABNORMAL
EOSINOPHILS RELATIVE PERCENT: 3 % (ref 1–4)
GFR AFRICAN AMERICAN: >60 ML/MIN
GFR NON-AFRICAN AMERICAN: >60 ML/MIN
GFR SERPL CREATININE-BSD FRML MDRD: ABNORMAL ML/MIN/{1.73_M2}
GFR SERPL CREATININE-BSD FRML MDRD: ABNORMAL ML/MIN/{1.73_M2}
GLUCOSE BLD-MCNC: 101 MG/DL (ref 75–110)
GLUCOSE BLD-MCNC: 110 MG/DL (ref 70–99)
GLUCOSE BLD-MCNC: 111 MG/DL (ref 75–110)
GLUCOSE BLD-MCNC: 113 MG/DL (ref 75–110)
HCT VFR BLD CALC: 35.5 % (ref 40.7–50.3)
HEMOGLOBIN: 11.3 G/DL (ref 13–17)
IMMATURE GRANULOCYTES: 2 %
LYMPHOCYTES # BLD: 17 % (ref 24–43)
MAGNESIUM: 2.1 MG/DL (ref 1.6–2.6)
MCH RBC QN AUTO: 30.1 PG (ref 25.2–33.5)
MCHC RBC AUTO-ENTMCNC: 31.8 G/DL (ref 28.4–34.8)
MCV RBC AUTO: 94.4 FL (ref 82.6–102.9)
MONOCYTES # BLD: 10 % (ref 3–12)
NRBC AUTOMATED: 0 PER 100 WBC
PDW BLD-RTO: 13.4 % (ref 11.8–14.4)
PHOSPHORUS: 3.3 MG/DL (ref 2.5–4.5)
PLATELET # BLD: 269 K/UL (ref 138–453)
PLATELET ESTIMATE: ABNORMAL
PMV BLD AUTO: 9.7 FL (ref 8.1–13.5)
POTASSIUM SERPL-SCNC: 4 MMOL/L (ref 3.7–5.3)
RBC # BLD: 3.76 M/UL (ref 4.21–5.77)
RBC # BLD: ABNORMAL 10*6/UL
SEG NEUTROPHILS: 68 % (ref 36–65)
SEGMENTED NEUTROPHILS ABSOLUTE COUNT: 8.28 K/UL (ref 1.5–8.1)
SODIUM BLD-SCNC: 135 MMOL/L (ref 135–144)
WBC # BLD: 12.2 K/UL (ref 3.5–11.3)
WBC # BLD: ABNORMAL 10*3/UL

## 2019-08-16 PROCEDURE — 6360000002 HC RX W HCPCS: Performed by: SURGERY

## 2019-08-16 PROCEDURE — 6360000002 HC RX W HCPCS: Performed by: INTERNAL MEDICINE

## 2019-08-16 PROCEDURE — 97535 SELF CARE MNGMENT TRAINING: CPT

## 2019-08-16 PROCEDURE — 99232 SBSQ HOSP IP/OBS MODERATE 35: CPT | Performed by: INTERNAL MEDICINE

## 2019-08-16 PROCEDURE — 2580000003 HC RX 258: Performed by: SURGERY

## 2019-08-16 PROCEDURE — 80048 BASIC METABOLIC PNL TOTAL CA: CPT

## 2019-08-16 PROCEDURE — 94761 N-INVAS EAR/PLS OXIMETRY MLT: CPT

## 2019-08-16 PROCEDURE — 82947 ASSAY GLUCOSE BLOOD QUANT: CPT

## 2019-08-16 PROCEDURE — 97110 THERAPEUTIC EXERCISES: CPT

## 2019-08-16 PROCEDURE — 36415 COLL VENOUS BLD VENIPUNCTURE: CPT

## 2019-08-16 PROCEDURE — 6370000000 HC RX 637 (ALT 250 FOR IP): Performed by: SURGERY

## 2019-08-16 PROCEDURE — 83735 ASSAY OF MAGNESIUM: CPT

## 2019-08-16 PROCEDURE — C9113 INJ PANTOPRAZOLE SODIUM, VIA: HCPCS | Performed by: SURGERY

## 2019-08-16 PROCEDURE — 85025 COMPLETE CBC W/AUTO DIFF WBC: CPT

## 2019-08-16 PROCEDURE — 84100 ASSAY OF PHOSPHORUS: CPT

## 2019-08-16 PROCEDURE — 2060000000 HC ICU INTERMEDIATE R&B

## 2019-08-16 PROCEDURE — 97530 THERAPEUTIC ACTIVITIES: CPT

## 2019-08-16 PROCEDURE — 2500000003 HC RX 250 WO HCPCS: Performed by: INTERNAL MEDICINE

## 2019-08-16 RX ORDER — LORAZEPAM 2 MG/ML
2 INJECTION INTRAMUSCULAR EVERY 4 HOURS PRN
Status: DISCONTINUED | OUTPATIENT
Start: 2019-08-16 | End: 2019-08-21 | Stop reason: HOSPADM

## 2019-08-16 RX ADMIN — MORPHINE SULFATE 2 MG: 2 INJECTION, SOLUTION INTRAMUSCULAR; INTRAVENOUS at 13:17

## 2019-08-16 RX ADMIN — NYSTATIN: 100000 CREAM TOPICAL at 08:48

## 2019-08-16 RX ADMIN — MORPHINE SULFATE 2 MG: 2 INJECTION, SOLUTION INTRAMUSCULAR; INTRAVENOUS at 17:47

## 2019-08-16 RX ADMIN — ANTI-FUNGAL POWDER MICONAZOLE NITRATE TALC FREE: 1.42 POWDER TOPICAL at 08:48

## 2019-08-16 RX ADMIN — CALCIUM GLUCONATE: 94 INJECTION, SOLUTION INTRAVENOUS at 17:04

## 2019-08-16 RX ADMIN — SODIUM CHLORIDE, POTASSIUM CHLORIDE, SODIUM LACTATE AND CALCIUM CHLORIDE: 600; 310; 30; 20 INJECTION, SOLUTION INTRAVENOUS at 15:31

## 2019-08-16 RX ADMIN — MORPHINE SULFATE 2 MG: 2 INJECTION, SOLUTION INTRAMUSCULAR; INTRAVENOUS at 15:31

## 2019-08-16 RX ADMIN — PANTOPRAZOLE SODIUM 40 MG: 40 INJECTION, POWDER, FOR SOLUTION INTRAVENOUS at 08:47

## 2019-08-16 RX ADMIN — Medication 10 ML: at 08:47

## 2019-08-16 RX ADMIN — METOCLOPRAMIDE 5 MG: 5 INJECTION, SOLUTION INTRAMUSCULAR; INTRAVENOUS at 17:05

## 2019-08-16 RX ADMIN — METOCLOPRAMIDE 5 MG: 5 INJECTION, SOLUTION INTRAMUSCULAR; INTRAVENOUS at 08:47

## 2019-08-16 RX ADMIN — MORPHINE SULFATE 4 MG: 4 INJECTION INTRAVENOUS at 06:02

## 2019-08-16 RX ADMIN — ALVIMOPAN 12 MG: 12 CAPSULE ORAL at 08:54

## 2019-08-16 RX ADMIN — MORPHINE SULFATE 2 MG: 2 INJECTION, SOLUTION INTRAMUSCULAR; INTRAVENOUS at 21:45

## 2019-08-16 RX ADMIN — ANTI-FUNGAL POWDER MICONAZOLE NITRATE TALC FREE: 1.42 POWDER TOPICAL at 21:11

## 2019-08-16 RX ADMIN — METOCLOPRAMIDE 5 MG: 5 INJECTION, SOLUTION INTRAMUSCULAR; INTRAVENOUS at 21:11

## 2019-08-16 RX ADMIN — MORPHINE SULFATE 4 MG: 4 INJECTION INTRAVENOUS at 08:47

## 2019-08-16 RX ADMIN — MORPHINE SULFATE 4 MG: 4 INJECTION INTRAVENOUS at 01:34

## 2019-08-16 RX ADMIN — I.V. FAT EMULSION 100 ML: 20 EMULSION INTRAVENOUS at 17:04

## 2019-08-16 RX ADMIN — NYSTATIN: 100000 CREAM TOPICAL at 21:11

## 2019-08-16 RX ADMIN — METOCLOPRAMIDE 5 MG: 5 INJECTION, SOLUTION INTRAMUSCULAR; INTRAVENOUS at 02:45

## 2019-08-16 RX ADMIN — Medication 10 ML: at 21:12

## 2019-08-16 ASSESSMENT — PAIN SCALES - GENERAL
PAINLEVEL_OUTOF10: 6
PAINLEVEL_OUTOF10: 6
PAINLEVEL_OUTOF10: 7
PAINLEVEL_OUTOF10: 6
PAINLEVEL_OUTOF10: 7
PAINLEVEL_OUTOF10: 6

## 2019-08-16 ASSESSMENT — PAIN DESCRIPTION - FREQUENCY
FREQUENCY: CONTINUOUS
FREQUENCY: CONTINUOUS

## 2019-08-16 ASSESSMENT — PAIN DESCRIPTION - ORIENTATION
ORIENTATION: MID
ORIENTATION: MID

## 2019-08-16 ASSESSMENT — PAIN DESCRIPTION - PAIN TYPE
TYPE: SURGICAL PAIN
TYPE: SURGICAL PAIN

## 2019-08-16 ASSESSMENT — PAIN DESCRIPTION - ONSET
ONSET: ON-GOING
ONSET: ON-GOING

## 2019-08-16 ASSESSMENT — PAIN DESCRIPTION - PROGRESSION
CLINICAL_PROGRESSION: NOT CHANGED
CLINICAL_PROGRESSION: NOT CHANGED

## 2019-08-16 ASSESSMENT — PAIN DESCRIPTION - LOCATION
LOCATION: ABDOMEN
LOCATION: ABDOMEN

## 2019-08-16 ASSESSMENT — PAIN DESCRIPTION - DESCRIPTORS
DESCRIPTORS: ACHING
DESCRIPTORS: ACHING

## 2019-08-16 NOTE — PROGRESS NOTES
General Surgery Progress Note            PATIENT NAME: Jon Chacko     TODAY'S DATE: 8/16/2019, 12:43 PM    SUBJECTIVE:    Pt  Seen and examined. OBJECTIVE:   VITALS:  BP (!) 111/47   Pulse 115   Temp 98.4 °F (36.9 °C) (Infrared)   Resp 14   Ht 5' 5\" (1.651 m)   Wt 126 lb 3.2 oz (57.2 kg)   SpO2 97%   BMI 21.00 kg/m²      INTAKE/OUTPUT:      Intake/Output Summary (Last 24 hours) at 8/16/2019 1243  Last data filed at 8/16/2019 0555  Gross per 24 hour   Intake 4264 ml   Output 4335 ml   Net -71 ml                 CONSTITUTIONAL:  awake and alert. No acute distress  HEART:   Regular   LUNGS:   Clear   ABDOMEN:   Abdomen soft, non-tender, mildly distended. Hypoactive BS. Incision intact. Drain SS. Some flatus noted in colostomy bag  EXTREMITIES:   No edema    Data:  CBC with Differential:    Lab Results   Component Value Date    WBC 12.2 08/16/2019    RBC 3.76 08/16/2019    HGB 11.3 08/16/2019    HCT 35.5 08/16/2019     08/16/2019    MCV 94.4 08/16/2019    MCH 30.1 08/16/2019    MCHC 31.8 08/16/2019    RDW 13.4 08/16/2019    LYMPHOPCT 17 08/16/2019    MONOPCT 10 08/16/2019    BASOPCT 0 08/16/2019    MONOSABS 1.26 08/16/2019    LYMPHSABS 2.02 08/16/2019    EOSABS 0.36 08/16/2019    BASOSABS 0.05 08/16/2019    DIFFTYPE NOT REPORTED 08/16/2019     CMP:    Lab Results   Component Value Date     08/16/2019    K 4.0 08/16/2019     08/16/2019    CO2 24 08/16/2019    BUN 12 08/16/2019    CREATININE 0.43 08/16/2019    GFRAA >60 08/16/2019    LABGLOM >60 08/16/2019    GLUCOSE 110 08/16/2019    PROT 4.1 08/14/2019    LABALBU 2.0 08/14/2019    CALCIUM 7.9 08/16/2019    BILITOT 0.28 08/14/2019    ALKPHOS 47 08/14/2019    AST 19 08/14/2019    ALT 13 08/14/2019         ASSESSMENT     Principal Problem:    Generalized abdominal pain  Active Problems:     Moderate malnutrition ([address]    Urinary tract infection    Sepsis ([address]    Small bowel obstruction (HCC)    Hydrocephalus    Movement disorder

## 2019-08-16 NOTE — PROGRESS NOTES
unable to tolerate and causing patient to lose balance. Activity: Patient sat EOB x 10-12 mins with Dependent Max x 2 A for seated balance. Patient has limited tolerance and poor posture. AM-PAC Score  AM-PAC Inpatient Mobility Raw Score : 8 (08/16/19 1542)  AM-PAC Inpatient T-Scale Score : 28.52 (08/16/19 1542)  Mobility Inpatient CMS 0-100% Score: 86.62 (08/16/19 1542)  Mobility Inpatient CMS G-Code Modifier : CM (08/16/19 1542)          Goals  Short term goals  Time Frame for Short term goals: 14 visits  Short term goal 1: Inc bed mobility to indep; Short term goal 2: Progress to OOB bed>W/C as appropriate & is safe to do so & tolerated by pt   Short term goal 3: Pt able to tolerate sitting EOB for 30 minutes to facility sitting balance, core stability & activity tolerance  Short term goal 4: Ed pt on safety functional mobility & breathing techniques; Patient Goals   Patient goals : return to facility, able to do what I could before    Plan    Plan  Times per week: Daily 6-7D/week  Current Treatment Recommendations: Strengthening, Balance Training, Functional Mobility Training, Endurance Training, Safety Education & Training, Patient/Caregiver Education & Training, Positioning  Safety Devices  Type of devices: Call light within reach, Gait belt, Patient at risk for falls, Left in bed, Nurse notified     Therapy Time   Individual Concurrent Group Co-treatment   Time In       1018   Time Out       36   Minutes       23        Co-treatment with OT warranted secondary to decreased safety and independence requiring 2 skilled therapy professionals to address individual discipline's goals. PT addressing postural control in sitting and PROM to promote ROM and functional mobility.     Gabriela Voss, PTA

## 2019-08-16 NOTE — PROGRESS NOTES
Transfers: Unable to assess  Slide Board: Unable to assess  Sit to stand: Unable to assess  Stand to sit: Unable to assess                       Cognition  Overall Cognitive Status: Exceptions  Arousal/Alertness: Appropriate responses to stimuli  Following Commands: Inconsistently follows commands; Follows one step commands with increased time; Follows one step commands with repetition  Attention Span: Appears intact  Memory: Decreased short term memory;Decreased recall of recent events  Safety Judgement: Decreased awareness of need for assistance;Decreased awareness of need for safety  Problem Solving: Assistance required to implement solutions;Assistance required to generate solutions;Assistance required to correct errors made;Assistance required to identify errors made;Decreased awareness of errors  Insights: Decreased awareness of deficits  Initiation: Requires cues for all  Sequencing: Requires cues for all  Cognition Comment: Pt is easily agitated     Perception  Overall Perceptual Status: Impaired  Initiation: Cues to initiate tasks                                   Plan   Plan  Times per week: 3-4x/week 1x/day as precious   Current Treatment Recommendations: ROM, Balance Training, Safety Education & Training, Pain Management, Patient/Caregiver Education & Training, Endurance Training, Equipment Evaluation, Education, & procurement, Positioning, Wheelchair Mobility Training           AM-PAC Score        -[facility] Inpatient Daily Activity Raw Score: 6 (08/16/19 1059)  AM-PAC Inpatient ADL T-Scale Score : 17.07 (08/16/19 1059)  ADL Inpatient CMS 0-100% Score: 100 (08/16/19 1059)  ADL Inpatient CMS G-Code Modifier : CN (08/16/19 1059)    Goals  Short term goals  Time Frame for Short term goals: by discharge, pt to demo   Short term goal 1: bed mob tasks with mod assist of 2 and use of rail. Short term goal 2: postural control EOB to min assist of 2 and precious > 10 mins to reduce fall risk.    Short term goal 3: Staff to

## 2019-08-16 NOTE — PROGRESS NOTES
[facility]   Urology Progress Note            Subjective:  Follow-up urinary retention indwelling Martinez    Patient Vitals for the past 24 hrs:   BP Temp Temp src Pulse Resp SpO2   08/16/19 1653 (!) 88/44 98.4 °F (36.9 °C) Oral 87 16 97 %   08/16/19 1351 105/62 98.1 °F (36.7 °C) Oral 90 18 95 %   08/16/19 1300 (!) 106/50 -- -- 91 19 98 %   08/16/19 1200 104/65 -- -- 97 17 98 %   08/16/19 1100 (!) 111/47 -- -- 115 14 97 %   08/16/19 1000 (!) 86/40 -- -- 88 15 96 %   08/16/19 0900 134/68 -- -- 69 18 99 %   08/16/19 0800 (!) 101/56 98.4 °F (36.9 °C) Infrared 86 10 99 %   08/16/19 0753 -- -- -- -- 11 99 %   08/16/19 0630 -- -- -- 91 13 99 %   08/16/19 0615 -- -- -- 86 8 99 %   08/16/19 0600 (!) 104/44 -- -- 98 18 98 %   08/16/19 0545 -- -- -- 90 -- --   08/16/19 0530 -- -- -- 90 14 --   08/16/19 0515 -- -- -- 103 27 --   08/16/19 0500 (!) 106/59 -- -- 97 14 --   08/16/19 0445 -- -- -- 89 11 --   08/16/19 0430 -- -- -- 82 12 --   08/16/19 0415 -- -- -- 84 15 98 %   08/16/19 0400 (!) 118/49 98.6 °F (37 °C) Infrared 96 15 98 %   08/16/19 0345 -- -- -- 83 16 99 %   08/16/19 0330 -- -- -- 90 13 99 %   08/16/19 0315 -- -- -- 83 17 99 %   08/16/19 0300 (!) 102/50 -- -- 97 17 99 %   08/16/19 0245 -- -- -- 104 17 93 %   08/16/19 0230 -- -- -- 81 11 --   08/16/19 0215 -- -- -- 86 11 --   08/16/19 0200 (!) 103/56 -- -- 101 15 92 %   08/16/19 0145 -- -- -- 95 17 99 %   08/16/19 0130 -- -- -- 94 19 98 %   08/16/19 0115 -- -- -- 92 12 98 %   08/16/19 0100 (!) 104/56 -- -- 89 15 98 %   08/16/19 0045 -- -- -- 88 11 99 %   08/16/19 0030 -- -- -- 107 16 99 %   08/16/19 0015 -- -- -- 97 19 99 %   08/16/19 0000 -- 98.4 °F (36.9 °C) Infrared 91 13 99 %   08/15/19 2345 -- -- -- 87 14 99 %   08/15/19 2330 -- -- -- 89 15 99 %   08/15/19 2315 -- -- -- 82 15 99 %   08/15/19 2300 (!) 114/50 -- -- 87 16 99 %   08/15/19 2245 -- -- -- 87 18 99 %   08/15/19 2230 -- -- -- 99 19 99 %   08/15/19 2215 -- -- -- 89 18 99

## 2019-08-16 NOTE — PROGRESS NOTES
[facility] - Progress Note    2019   5:40 PM    Name:  Darcy Malhotra  :    1975  Age:  53 y.o. male  MRN:    [id number]     Acct:     [de-identified]   Room:  [facility] Day: [address]: [place] Date: 2019  9:42 AM  PCP: Ramon Wagoner     Subjective:     C/C:   Chief Complaint   Patient presents with    Altered Mental Status     this am     Tachycardia     rate 140       Interval History: Status: Improving. NG tube drainage appears to have decreased. The patient states he is beginning to have flatus. There is been no further emesis. He denies nausea. Zosyn has been discontinued per ID as his white count is normal.  Currently his procalcitonin is slightly elevated at 0.36 and this will be monitored. Repeat KUB tomorrow. The patient has been started on Entereg per surgery for postop opiate induced ileus. Pulmonary, ID and surgical notes have been reviewed. He is resting comfortably. His pain appears to be controlled. His blood pressure is stable. Potassium has been replaced. He is POD #7 exploratory laparotomy, left colectomy with colostomy and appendectomy. Surgical sites appear clean and dry. Patient is stable for transfer to progressive care. History: 49-year-old  male with known history of cerebral palsy and hydrocephalus presents with change in mental status and tachycardia to heart rate of 140. He was admitted to the hospital for management of sepsis related to urinary tract infection and fecal impaction. Additional symptomatology included a large episode of black stools, nausea and vomiting. The patient relates chronic constipation for 2 to 3 months and generalized abdominal pain however on the day of admission his pain became 10/10 and he was transported to the emergency room. Additional symptomatology includes diaphoresis, shortness of breath as well as abdominal distention.   He is primarily wheelchair bound with contraction secondary to cerebral palsy. On admission his white count was 22.3 with a lactic acid elevated at 3.9. Urinary tract was consistent with infection and CT of the abdomen reports markedly dilated rectosigmoid colon with a large fecal load. CT chest shows left lower lobe collapse with resultant elevation of the left hemidiaphragm and extension of the stomach and left colon into the thoracic cavity with large amounts of stool. Code sepsis was initiated. NG tube was placed and the patient was transported to ICU. The patient was taken to surgery on 8/9/2019 for exploratory laparotomy, left colectomy with colostomy and appendectomy. ROS:  Constitutional: Negative for chills, diaphoresis, fever. Positive for lightheadedness and nausea. HENT: Negative for ear pain, hearing loss, nosebleeds, sore throat and tinnitus. Eyes: Negative for blurred vision, double vision, photophobia and pain. Respiratory: Negative for cough, hemoptysis, sputum production, shortness of breath and wheezing. Cardiovascular: Negative for palpitations, orthopnea, claudication, leg swelling and PND. Gastrointestinal: Positive for abdominal pain and nausea and emesis. Genitourinary: Negative for dysuria, flank pain, frequency, hematuria and urgency. Musculoskeletal: Patient remains severely contracted  Skin: Negative for itching and rash. Neurological: Positive for lightheadedness. Endo/Heme/Allergies: Does not bruise/bleed easily. Psychiatric/Behavioral: Patient states she is disheartened. Medications: Allergies:    Allergies   Allergen Reactions    Pollen Extract        Current Meds:    alvimopan  12 mg Oral BID    fat emulsion  100 mL Intravenous Daily    metoclopramide  5 mg Intravenous Q6H    pantoprazole  40 mg Intravenous Daily    And    sodium chloride (PF)  10 mL Intravenous Daily    insulin lispro  0-6 Units Subcutaneous Q6H    polyethylene glycol  17 g Oral Daily    fentaNYL

## 2019-08-16 NOTE — PROGRESS NOTES
Pulmonary Critical Care Progress Note  Yadira Beatty MD     Patient seen for the follow up of hypotension/dehydration, Generalized abdominal pain     Subjective:  He denies chest pain. He admits to a productive cough with thick white sputum. NG remains in place, he had 4L out of NG yesterday. He still has no bowel sounds or output from ostomy. He is NPO and is tolerating TPN. He is feeling frustrated. Examination:  Vitals: BP (!) 104/44   Pulse 91   Temp 98.6 °F (37 °C) (Infrared)   Resp 11   Ht 5' 5\" (1.651 m)   Wt 126 lb 3.2 oz (57.2 kg)   SpO2 99%   BMI 21.00 kg/m²   General appearance: alert and cooperative with exam  Neck: No JVD  Lungs: diminished bibasilarly   Heart: + tachycardia, S1, S2 normal, no gallop  Abdomen: Soft, non tender, BS absent, positive colostomy  Extremities: no cyanosis or clubbing. No significant edema    LABs:  CBC:   Recent Labs     08/15/19  0430 08/16/19  0440   WBC 10.9 12.2*   HGB 10.7* 11.3*   HCT 32.9* 35.5*    269     BMP:   Recent Labs     08/15/19  0430 08/16/19  0440    135   K 3.7 4.0   CO2 25 24   BUN 13 12   CREATININE 0.42* 0.43*   LABGLOM >60 >60   GLUCOSE 129* 110*     LIVER PROFILE:  Recent Labs     08/14/19  0522   AST 19   ALT 13   LABALBU 2.0*     ABG:  Lab Results   Component Value Date    KLL1EGO 20 08/08/2019    FIO2 21.0 08/08/2019       Lab Results   Component Value Date    POCPH 7.36 08/08/2019    POCPCO2 34 08/08/2019    POCPO2 74 08/08/2019    POCHCO3 18.8 08/08/2019    NBEA 7 08/08/2019    PBEA NOT REPORTED 08/08/2019    BBR0PWT 20 08/08/2019    HIPI6LRT 94 08/08/2019    FIO2 21.0 08/08/2019     Radiology:   8/15/19      Impression:  · Hypotension/dehydration - resolved  · Significant colon dilatation - s/p left colectomy/colostomy 8/9  · Pneumonia/atelectasis/?  Left pleural effusion  · Cerebral palsy/hydrocephalus  · History of smoking/emphysema  · Right inguinal hernia  · Hiatal hernia  · Mildly elevated LFTs/ammonia  · Post-op

## 2019-08-17 ENCOUNTER — APPOINTMENT (OUTPATIENT)
Dept: GENERAL RADIOLOGY | Age: 44
DRG: 854 | End: 2019-08-17
Payer: MEDICARE

## 2019-08-17 LAB
ABSOLUTE EOS #: 0.39 K/UL (ref 0–0.44)
ABSOLUTE IMMATURE GRANULOCYTE: 0.21 K/UL (ref 0–0.3)
ABSOLUTE LYMPH #: 1.8 K/UL (ref 1.1–3.7)
ABSOLUTE MONO #: 0.99 K/UL (ref 0.1–1.2)
ANION GAP SERPL CALCULATED.3IONS-SCNC: 12 MMOL/L (ref 9–17)
BASOPHILS # BLD: 0 % (ref 0–2)
BASOPHILS ABSOLUTE: 0.05 K/UL (ref 0–0.2)
BUN BLDV-MCNC: 11 MG/DL (ref 6–20)
BUN/CREAT BLD: 28 (ref 9–20)
CALCIUM SERPL-MCNC: 8.3 MG/DL (ref 8.6–10.4)
CHLORIDE BLD-SCNC: 105 MMOL/L (ref 98–107)
CO2: 19 MMOL/L (ref 20–31)
CREAT SERPL-MCNC: 0.4 MG/DL (ref 0.7–1.2)
DIFFERENTIAL TYPE: ABNORMAL
EOSINOPHILS RELATIVE PERCENT: 3 % (ref 1–4)
GFR AFRICAN AMERICAN: >60 ML/MIN
GFR NON-AFRICAN AMERICAN: >60 ML/MIN
GFR SERPL CREATININE-BSD FRML MDRD: ABNORMAL ML/MIN/{1.73_M2}
GFR SERPL CREATININE-BSD FRML MDRD: ABNORMAL ML/MIN/{1.73_M2}
GLUCOSE BLD-MCNC: 107 MG/DL (ref 70–99)
GLUCOSE BLD-MCNC: 116 MG/DL (ref 75–110)
GLUCOSE BLD-MCNC: 118 MG/DL (ref 75–110)
GLUCOSE BLD-MCNC: 99 MG/DL (ref 75–110)
HCT VFR BLD CALC: 35.2 % (ref 40.7–50.3)
HEMOGLOBIN: 11.2 G/DL (ref 13–17)
IMMATURE GRANULOCYTES: 2 %
LYMPHOCYTES # BLD: 14 % (ref 24–43)
MCH RBC QN AUTO: 29.8 PG (ref 25.2–33.5)
MCHC RBC AUTO-ENTMCNC: 31.8 G/DL (ref 28.4–34.8)
MCV RBC AUTO: 93.6 FL (ref 82.6–102.9)
MONOCYTES # BLD: 8 % (ref 3–12)
NRBC AUTOMATED: 0 PER 100 WBC
PDW BLD-RTO: 13.2 % (ref 11.8–14.4)
PLATELET # BLD: 328 K/UL (ref 138–453)
PLATELET ESTIMATE: ABNORMAL
PMV BLD AUTO: 10 FL (ref 8.1–13.5)
POTASSIUM SERPL-SCNC: 4.1 MMOL/L (ref 3.7–5.3)
RBC # BLD: 3.76 M/UL (ref 4.21–5.77)
RBC # BLD: ABNORMAL 10*6/UL
SEG NEUTROPHILS: 73 % (ref 36–65)
SEGMENTED NEUTROPHILS ABSOLUTE COUNT: 9.07 K/UL (ref 1.5–8.1)
SODIUM BLD-SCNC: 136 MMOL/L (ref 135–144)
WBC # BLD: 12.5 K/UL (ref 3.5–11.3)
WBC # BLD: ABNORMAL 10*3/UL

## 2019-08-17 PROCEDURE — 6360000002 HC RX W HCPCS: Performed by: INTERNAL MEDICINE

## 2019-08-17 PROCEDURE — 2580000003 HC RX 258: Performed by: SURGERY

## 2019-08-17 PROCEDURE — 85025 COMPLETE CBC W/AUTO DIFF WBC: CPT

## 2019-08-17 PROCEDURE — 80048 BASIC METABOLIC PNL TOTAL CA: CPT

## 2019-08-17 PROCEDURE — 2580000003 HC RX 258: Performed by: NURSE PRACTITIONER

## 2019-08-17 PROCEDURE — 2500000003 HC RX 250 WO HCPCS: Performed by: INTERNAL MEDICINE

## 2019-08-17 PROCEDURE — 6370000000 HC RX 637 (ALT 250 FOR IP): Performed by: SURGERY

## 2019-08-17 PROCEDURE — 99232 SBSQ HOSP IP/OBS MODERATE 35: CPT | Performed by: INTERNAL MEDICINE

## 2019-08-17 PROCEDURE — 6360000002 HC RX W HCPCS: Performed by: SURGERY

## 2019-08-17 PROCEDURE — 36415 COLL VENOUS BLD VENIPUNCTURE: CPT

## 2019-08-17 PROCEDURE — 2060000000 HC ICU INTERMEDIATE R&B

## 2019-08-17 PROCEDURE — 99232 SBSQ HOSP IP/OBS MODERATE 35: CPT | Performed by: FAMILY MEDICINE

## 2019-08-17 PROCEDURE — C9113 INJ PANTOPRAZOLE SODIUM, VIA: HCPCS | Performed by: SURGERY

## 2019-08-17 PROCEDURE — 74018 RADEX ABDOMEN 1 VIEW: CPT

## 2019-08-17 PROCEDURE — 2500000003 HC RX 250 WO HCPCS: Performed by: SURGERY

## 2019-08-17 PROCEDURE — 82947 ASSAY GLUCOSE BLOOD QUANT: CPT

## 2019-08-17 RX ORDER — 0.9 % SODIUM CHLORIDE 0.9 %
500 INTRAVENOUS SOLUTION INTRAVENOUS ONCE
Status: COMPLETED | OUTPATIENT
Start: 2019-08-17 | End: 2019-08-17

## 2019-08-17 RX ADMIN — METOCLOPRAMIDE 5 MG: 5 INJECTION, SOLUTION INTRAMUSCULAR; INTRAVENOUS at 21:18

## 2019-08-17 RX ADMIN — METOCLOPRAMIDE 5 MG: 5 INJECTION, SOLUTION INTRAMUSCULAR; INTRAVENOUS at 03:58

## 2019-08-17 RX ADMIN — CALCIUM GLUCONATE: 94 INJECTION, SOLUTION INTRAVENOUS at 18:23

## 2019-08-17 RX ADMIN — PANTOPRAZOLE SODIUM 40 MG: 40 INJECTION, POWDER, FOR SOLUTION INTRAVENOUS at 11:09

## 2019-08-17 RX ADMIN — GABAPENTIN 100 MG: 100 CAPSULE ORAL at 10:57

## 2019-08-17 RX ADMIN — METOCLOPRAMIDE 5 MG: 5 INJECTION, SOLUTION INTRAMUSCULAR; INTRAVENOUS at 14:35

## 2019-08-17 RX ADMIN — ANTI-FUNGAL POWDER MICONAZOLE NITRATE TALC FREE: 1.42 POWDER TOPICAL at 14:35

## 2019-08-17 RX ADMIN — FERROUS SULFATE TAB EC 325 MG (65 MG FE EQUIVALENT) 325 MG: 325 (65 FE) TABLET DELAYED RESPONSE at 10:56

## 2019-08-17 RX ADMIN — LAMOTRIGINE 25 MG: 25 TABLET ORAL at 21:18

## 2019-08-17 RX ADMIN — ALVIMOPAN 12 MG: 12 CAPSULE ORAL at 21:18

## 2019-08-17 RX ADMIN — LAMOTRIGINE 25 MG: 25 TABLET ORAL at 10:52

## 2019-08-17 RX ADMIN — ALVIMOPAN 12 MG: 12 CAPSULE ORAL at 10:57

## 2019-08-17 RX ADMIN — SODIUM CHLORIDE 500 ML: 9 INJECTION, SOLUTION INTRAVENOUS at 02:55

## 2019-08-17 RX ADMIN — MORPHINE SULFATE 4 MG: 4 INJECTION INTRAVENOUS at 10:17

## 2019-08-17 RX ADMIN — TAMSULOSIN HYDROCHLORIDE 0.4 MG: 0.4 CAPSULE ORAL at 10:53

## 2019-08-17 RX ADMIN — I.V. FAT EMULSION 100 ML: 20 EMULSION INTRAVENOUS at 18:34

## 2019-08-17 RX ADMIN — METOCLOPRAMIDE 5 MG: 5 INJECTION, SOLUTION INTRAMUSCULAR; INTRAVENOUS at 11:09

## 2019-08-17 RX ADMIN — OXYBUTYNIN CHLORIDE 10 MG: 5 TABLET, EXTENDED RELEASE ORAL at 10:55

## 2019-08-17 RX ADMIN — POLYETHYLENE GLYCOL 3350 17 G: 17 POWDER, FOR SOLUTION ORAL at 10:52

## 2019-08-17 RX ADMIN — SODIUM CHLORIDE, POTASSIUM CHLORIDE, SODIUM LACTATE AND CALCIUM CHLORIDE: 600; 310; 30; 20 INJECTION, SOLUTION INTRAVENOUS at 14:35

## 2019-08-17 RX ADMIN — Medication 10 ML: at 09:36

## 2019-08-17 RX ADMIN — GABAPENTIN 100 MG: 100 CAPSULE ORAL at 21:18

## 2019-08-17 RX ADMIN — Medication 10 ML: at 11:14

## 2019-08-17 RX ADMIN — VILAZODONE HYDROCHLORIDE 20 MG: 20 TABLET ORAL at 10:54

## 2019-08-17 ASSESSMENT — PAIN SCALES - GENERAL
PAINLEVEL_OUTOF10: 10
PAINLEVEL_OUTOF10: 7
PAINLEVEL_OUTOF10: 9

## 2019-08-17 ASSESSMENT — ENCOUNTER SYMPTOMS
NAUSEA: 1
RESPIRATORY NEGATIVE: 1
CONSTIPATION: 1
ABDOMINAL PAIN: 1
SHORTNESS OF BREATH: 0

## 2019-08-17 NOTE — PROGRESS NOTES
Physical Therapy  DATE: 2019    NAME: Dom Okeefe  MRN: [id number]   : 1975    Patient not seen this date for Physical Therapy due to:  [] Blood transfusion in progress  [] Cancel by RN  [] Hemodialysis  [x]  Refusal by Patient  states hes having a bad day, and therapy can try again tomorrow   [] Spine Precautions   [] Strict Bedrest  [] Surgery  [] Testing      [] Other        [] PT being discontinued at this time. Patient independent. No further needs. [] PT being discontinued at this time as the patient has been transferred to hospice care. No further needs.     Aria Andrews

## 2019-08-17 NOTE — PROGRESS NOTES
General Surgery Progress Note      Discussed with nurse  Continues to have rather high nasogastric tube output  Abdomen soft, nondistended, minimally tender.   Hypoactive bowel sounds    Labs reviewed  Status post left colectomy with colostomy  Severe postoperative ileus  Continue current management  Operative intervention next week if no changes seen        Selene Spiveyoe, DO [address]

## 2019-08-17 NOTE — PROGRESS NOTES
18.8 08/08/2019    NBEA 7 08/08/2019    PBEA NOT REPORTED 08/08/2019    CRO8FLU 20 08/08/2019    NTRV2RCQ 94 08/08/2019    FIO2 21.0 08/08/2019     Lab Results   Component Value Date/Time    SPECIAL NOT REPORTED 08/13/2019 05:09 PM     Lab Results   Component Value Date/Time    CULTURE NO GROWTH 08/13/2019 05:09 PM       Radiology:  Ct Abdomen Pelvis Wo Contrast Additional Contrast? None    Result Date: 8/13/2019  1. Interval sigmoidectomy and left lower quadrant colostomy formation. The majority of the small bowel is now dilated and fluid-filled, which is new. There are some decompressed small bowel loops more distally. Findings are compatible with either a postoperative ileus or distal small bowel obstruction. 2. Pelvic drain in place. No evidence of an abscess. 3. Stable large hiatal hernia, which contains the majority of the stomach, portions of the bowel, and pancreatic tail. There is associated compressive atelectasis/scarring of the left lower lung. No evidence of pneumonia. The hernia is not resulting in a bowel obstruction. 4. Nasogastric tube in place. Xr Abdomen (kub) (single Ap View)    Result Date: 8/15/2019  Less prominent gaseous distention of small bowel. Mild gaseous prominence of the proximal colon is similar in appearance. Overall these findings favor postoperative ileus. Enteric tube in place, within partially visualized large hiatal hernia. Xr Abdomen (kub) (single Ap View)    Result Date: 8/12/2019  Interval abdominal surgery. Small bowel distension compatible with postoperative ileus. Ct Chest Wo Contrast    Result Date: 8/13/2019  1. Interval sigmoidectomy and left lower quadrant colostomy formation. The majority of the small bowel is now dilated and fluid-filled, which is new. There are some decompressed small bowel loops more distally. Findings are compatible with either a postoperative ileus or distal small bowel obstruction. 2. Pelvic drain in place.   No evidence of an abscess. 3. Stable large hiatal hernia, which contains the majority of the stomach, portions of the bowel, and pancreatic tail. There is associated compressive atelectasis/scarring of the left lower lung. No evidence of pneumonia. The hernia is not resulting in a bowel obstruction. 4. Nasogastric tube in place. Xr Chest Portable    Result Date: 8/15/2019  1. Support devices as above. 2. Large left-sided hiatal hernia. 3. Patchy opacification of the left mid to lower lung with likely left pleural fluid. Xr Abdomen For Ng/og/ne Tube Placement    Result Date: 8/13/2019  Enteric tube coiled in the stomach, within a large hiatal hernia. Partially visualized abdomen demonstrates findings consistent with postoperative ileus. Physical Examination:        Physical Exam   Constitutional: He is cooperative. HENT:   Head: Atraumatic. NG tube in place   Eyes: EOM are normal.   Cardiovascular: Normal rate, regular rhythm and normal heart sounds. Pulmonary/Chest: Effort normal and breath sounds normal. No respiratory distress. He has no wheezes. Abdominal: Soft. Bowel sounds are absent. Midline scar with staples intact. LLQ colostomy in place. Musculoskeletal: He exhibits no edema. Contractures of extremities   Neurological: He is alert. Skin: Skin is warm and dry.        Assessment:        Hospital Problems           Last Modified POA    * (Principal) Generalized abdominal pain 8/9/2019 Yes    Moderate malnutrition ([address] 8/15/2019 Yes    Urinary tract infection 8/8/2019 Yes    Sepsis ([address] 8/9/2019 Yes    Small bowel obstruction ([address] 8/8/2019 Yes    Hydrocephalus 8/8/2019 Yes    Movement disorder 8/8/2019 Yes    GERD (gastroesophageal reflux disease) 8/9/2019 Yes    Cerebral palsy, athetoid ([address] 8/9/2019 Yes    Tobacco abuse 8/8/2019 Yes    H/O small bowel obstruction 8/8/2019 Yes    Fecal impaction ([address] 8/9/2019 Yes    Hyperglycemia 8/8/2019 Yes    Chronic idiopathic constipation 8/9/2019

## 2019-08-18 LAB
ABSOLUTE EOS #: 0.25 K/UL (ref 0–0.44)
ABSOLUTE IMMATURE GRANULOCYTE: 0.16 K/UL (ref 0–0.3)
ABSOLUTE LYMPH #: 1.86 K/UL (ref 1.1–3.7)
ABSOLUTE MONO #: 1.11 K/UL (ref 0.1–1.2)
ANION GAP SERPL CALCULATED.3IONS-SCNC: 10 MMOL/L (ref 9–17)
BASOPHILS # BLD: 0 % (ref 0–2)
BASOPHILS ABSOLUTE: 0.06 K/UL (ref 0–0.2)
BUN BLDV-MCNC: 18 MG/DL (ref 6–20)
BUN/CREAT BLD: 39 (ref 9–20)
CALCIUM SERPL-MCNC: 8.1 MG/DL (ref 8.6–10.4)
CHLORIDE BLD-SCNC: 104 MMOL/L (ref 98–107)
CO2: 23 MMOL/L (ref 20–31)
CREAT SERPL-MCNC: 0.46 MG/DL (ref 0.7–1.2)
DIFFERENTIAL TYPE: ABNORMAL
EOSINOPHILS RELATIVE PERCENT: 2 % (ref 1–4)
GFR AFRICAN AMERICAN: >60 ML/MIN
GFR NON-AFRICAN AMERICAN: >60 ML/MIN
GFR SERPL CREATININE-BSD FRML MDRD: ABNORMAL ML/MIN/{1.73_M2}
GFR SERPL CREATININE-BSD FRML MDRD: ABNORMAL ML/MIN/{1.73_M2}
GLUCOSE BLD-MCNC: 100 MG/DL (ref 75–110)
GLUCOSE BLD-MCNC: 101 MG/DL (ref 75–110)
GLUCOSE BLD-MCNC: 104 MG/DL (ref 75–110)
GLUCOSE BLD-MCNC: 111 MG/DL (ref 75–110)
GLUCOSE BLD-MCNC: 133 MG/DL (ref 70–99)
GLUCOSE BLD-MCNC: 97 MG/DL (ref 75–110)
HCT VFR BLD CALC: 36.2 % (ref 40.7–50.3)
HEMOGLOBIN: 11.8 G/DL (ref 13–17)
IMMATURE GRANULOCYTES: 1 %
LYMPHOCYTES # BLD: 14 % (ref 24–43)
MCH RBC QN AUTO: 29.8 PG (ref 25.2–33.5)
MCHC RBC AUTO-ENTMCNC: 32.6 G/DL (ref 28.4–34.8)
MCV RBC AUTO: 91.4 FL (ref 82.6–102.9)
MONOCYTES # BLD: 8 % (ref 3–12)
NRBC AUTOMATED: 0 PER 100 WBC
PDW BLD-RTO: 13.1 % (ref 11.8–14.4)
PLATELET # BLD: 407 K/UL (ref 138–453)
PLATELET ESTIMATE: ABNORMAL
PMV BLD AUTO: 10 FL (ref 8.1–13.5)
POTASSIUM SERPL-SCNC: 4.2 MMOL/L (ref 3.7–5.3)
RBC # BLD: 3.96 M/UL (ref 4.21–5.77)
RBC # BLD: ABNORMAL 10*6/UL
SEG NEUTROPHILS: 75 % (ref 36–65)
SEGMENTED NEUTROPHILS ABSOLUTE COUNT: 9.92 K/UL (ref 1.5–8.1)
SODIUM BLD-SCNC: 137 MMOL/L (ref 135–144)
WBC # BLD: 13.4 K/UL (ref 3.5–11.3)
WBC # BLD: ABNORMAL 10*3/UL

## 2019-08-18 PROCEDURE — 6360000002 HC RX W HCPCS: Performed by: SURGERY

## 2019-08-18 PROCEDURE — 6360000002 HC RX W HCPCS: Performed by: INTERNAL MEDICINE

## 2019-08-18 PROCEDURE — C9113 INJ PANTOPRAZOLE SODIUM, VIA: HCPCS | Performed by: SURGERY

## 2019-08-18 PROCEDURE — 6370000000 HC RX 637 (ALT 250 FOR IP): Performed by: SURGERY

## 2019-08-18 PROCEDURE — 36415 COLL VENOUS BLD VENIPUNCTURE: CPT

## 2019-08-18 PROCEDURE — 82947 ASSAY GLUCOSE BLOOD QUANT: CPT

## 2019-08-18 PROCEDURE — 2580000003 HC RX 258: Performed by: SURGERY

## 2019-08-18 PROCEDURE — 6360000002 HC RX W HCPCS: Performed by: NURSE PRACTITIONER

## 2019-08-18 PROCEDURE — 99232 SBSQ HOSP IP/OBS MODERATE 35: CPT | Performed by: FAMILY MEDICINE

## 2019-08-18 PROCEDURE — 2500000003 HC RX 250 WO HCPCS: Performed by: INTERNAL MEDICINE

## 2019-08-18 PROCEDURE — 85025 COMPLETE CBC W/AUTO DIFF WBC: CPT

## 2019-08-18 PROCEDURE — 2060000000 HC ICU INTERMEDIATE R&B

## 2019-08-18 PROCEDURE — 80048 BASIC METABOLIC PNL TOTAL CA: CPT

## 2019-08-18 RX ORDER — LORAZEPAM 2 MG/ML
1 INJECTION INTRAMUSCULAR EVERY 6 HOURS PRN
Status: DISCONTINUED | OUTPATIENT
Start: 2019-08-18 | End: 2019-08-21 | Stop reason: HOSPADM

## 2019-08-18 RX ADMIN — Medication 10 ML: at 09:03

## 2019-08-18 RX ADMIN — ALVIMOPAN 12 MG: 12 CAPSULE ORAL at 09:02

## 2019-08-18 RX ADMIN — OXYBUTYNIN CHLORIDE 10 MG: 5 TABLET, EXTENDED RELEASE ORAL at 09:03

## 2019-08-18 RX ADMIN — FERROUS SULFATE TAB EC 325 MG (65 MG FE EQUIVALENT) 325 MG: 325 (65 FE) TABLET DELAYED RESPONSE at 09:03

## 2019-08-18 RX ADMIN — METOCLOPRAMIDE 5 MG: 5 INJECTION, SOLUTION INTRAMUSCULAR; INTRAVENOUS at 22:09

## 2019-08-18 RX ADMIN — LAMOTRIGINE 25 MG: 25 TABLET ORAL at 09:02

## 2019-08-18 RX ADMIN — Medication 10 ML: at 22:10

## 2019-08-18 RX ADMIN — METOCLOPRAMIDE 5 MG: 5 INJECTION, SOLUTION INTRAMUSCULAR; INTRAVENOUS at 09:03

## 2019-08-18 RX ADMIN — MORPHINE SULFATE 2 MG: 2 INJECTION, SOLUTION INTRAMUSCULAR; INTRAVENOUS at 19:55

## 2019-08-18 RX ADMIN — NYSTATIN: 100000 CREAM TOPICAL at 22:09

## 2019-08-18 RX ADMIN — VILAZODONE HYDROCHLORIDE 20 MG: 20 TABLET ORAL at 09:02

## 2019-08-18 RX ADMIN — LORAZEPAM 1 MG: 2 INJECTION, SOLUTION INTRAMUSCULAR; INTRAVENOUS at 09:34

## 2019-08-18 RX ADMIN — ANTI-FUNGAL POWDER MICONAZOLE NITRATE TALC FREE: 1.42 POWDER TOPICAL at 22:09

## 2019-08-18 RX ADMIN — NYSTATIN: 100000 CREAM TOPICAL at 09:21

## 2019-08-18 RX ADMIN — LAMOTRIGINE 25 MG: 25 TABLET ORAL at 22:09

## 2019-08-18 RX ADMIN — ONDANSETRON 4 MG: 2 INJECTION INTRAMUSCULAR; INTRAVENOUS at 00:29

## 2019-08-18 RX ADMIN — MORPHINE SULFATE 4 MG: 4 INJECTION INTRAVENOUS at 08:21

## 2019-08-18 RX ADMIN — ALVIMOPAN 12 MG: 12 CAPSULE ORAL at 22:09

## 2019-08-18 RX ADMIN — METOCLOPRAMIDE 5 MG: 5 INJECTION, SOLUTION INTRAMUSCULAR; INTRAVENOUS at 15:35

## 2019-08-18 RX ADMIN — TAMSULOSIN HYDROCHLORIDE 0.4 MG: 0.4 CAPSULE ORAL at 09:03

## 2019-08-18 RX ADMIN — GABAPENTIN 100 MG: 100 CAPSULE ORAL at 09:03

## 2019-08-18 RX ADMIN — ANTI-FUNGAL POWDER MICONAZOLE NITRATE TALC FREE: 1.42 POWDER TOPICAL at 09:21

## 2019-08-18 RX ADMIN — GABAPENTIN 100 MG: 100 CAPSULE ORAL at 22:09

## 2019-08-18 RX ADMIN — I.V. FAT EMULSION 100 ML: 20 EMULSION INTRAVENOUS at 18:22

## 2019-08-18 RX ADMIN — LORAZEPAM 1 MG: 2 INJECTION, SOLUTION INTRAMUSCULAR; INTRAVENOUS at 23:58

## 2019-08-18 RX ADMIN — MORPHINE SULFATE 2 MG: 2 INJECTION, SOLUTION INTRAMUSCULAR; INTRAVENOUS at 14:06

## 2019-08-18 RX ADMIN — METOCLOPRAMIDE 5 MG: 5 INJECTION, SOLUTION INTRAMUSCULAR; INTRAVENOUS at 03:21

## 2019-08-18 RX ADMIN — SODIUM CHLORIDE, POTASSIUM CHLORIDE, SODIUM LACTATE AND CALCIUM CHLORIDE: 600; 310; 30; 20 INJECTION, SOLUTION INTRAVENOUS at 09:35

## 2019-08-18 RX ADMIN — POLYETHYLENE GLYCOL 3350 17 G: 17 POWDER, FOR SOLUTION ORAL at 09:02

## 2019-08-18 RX ADMIN — PANTOPRAZOLE SODIUM 40 MG: 40 INJECTION, POWDER, FOR SOLUTION INTRAVENOUS at 09:02

## 2019-08-18 RX ADMIN — CALCIUM GLUCONATE: 94 INJECTION, SOLUTION INTRAVENOUS at 18:22

## 2019-08-18 ASSESSMENT — PAIN SCALES - GENERAL
PAINLEVEL_OUTOF10: 5
PAINLEVEL_OUTOF10: 8
PAINLEVEL_OUTOF10: 8
PAINLEVEL_OUTOF10: 5
PAINLEVEL_OUTOF10: 8
PAINLEVEL_OUTOF10: 0

## 2019-08-18 ASSESSMENT — ENCOUNTER SYMPTOMS
NAUSEA: 1
ABDOMINAL PAIN: 1
CONSTIPATION: 1
SHORTNESS OF BREATH: 0

## 2019-08-18 ASSESSMENT — PAIN DESCRIPTION - PAIN TYPE: TYPE: SURGICAL PAIN

## 2019-08-18 ASSESSMENT — PAIN DESCRIPTION - LOCATION: LOCATION: ABDOMEN

## 2019-08-18 NOTE — PROGRESS NOTES
Nutrition Assessment (Parenteral Nutrition)    Type and Reason for Visit: Reassess    Nutrition Recommendations: 1. Suggest continuing NPO Effective Now.  2. Consider maintaining PN-Adult 2-in-1 Central Line (Custom) @ 65 ml/hr, with IV Fat Emulsions 20% 100 ml, @ 8.33 ml/hr x 12 hrs. Additive changes:  See below. Nutrition Assessment: Per RN:  Pt wants to be left alone to sleep this am.  Pt remains moderately malnourished, in context of an acute illness (post-op dx:  severe fecal impaction with significant colonic distension). Continues to be NPO status r/t partial SBO vs post-op ileus per XR Abd/KUB (8/17), but from a nutritional standpoint is stable, with PN @ 65 ml/hr (goal). Communicated with Pharmacist about suggested changes in PN order. Malnutrition Assessment:  · Malnutrition Status: Meets the criteria for moderate malnutrition  · Context: Acute illness or injury  · Findings of the 6 clinical characteristics of malnutrition (Minimum of 2 out of 6 clinical characteristics is required to make the diagnosis of moderate or severe Protein Calorie Malnutrition based on AND/ASPEN Guidelines):  1. Energy Intake-Less than or equal to 50% of estimated energy requirement, Greater than or equal to 5 days    2. Weight Loss-No significant weight loss, unable to assess  3. Fat Loss-Mild subcutaneous fat loss, Orbital, Triceps  4. Muscle Loss-Mild muscle mass loss, Temples (temporalis muscle), Clavicles (pectoralis and deltoids), Calf (gastrocnemius), Interosseous  5. Fluid Accumulation-Mild fluid accumulation, Extremities  6.  Strength-Not measured    Nutrition Risk Level: High    Nutrient Needs:  · Estimated Daily Total Kcal: 1,750-1,900 kcal (31-33) Post-op  · Estimated Daily Protein (g): 65-75 g (1.2-1.3 g/kg) Post-op  · Estimated Daily Total Fluid (ml/day): 1,800-1,900 ml (1 ml/kcal)    Nutrition Diagnosis:   · Problem:  Moderate malnutrition, In context of acute illness or injury  · Etiology:

## 2019-08-18 NOTE — PROGRESS NOTES
Jamie Rueda   Urology Progress Note            Subjective: Follow-up urinary retention indwelling Martinez    Patient Vitals for the past 24 hrs:   BP Temp Temp src Pulse Resp SpO2   08/18/19 1130 (!) 106/51 98.3 °F (36.8 °C) Oral 107 16 96 %   08/18/19 0930 (!) 110/54 -- -- -- -- --   08/18/19 0917 (!) 95/52 98.6 °F (37 °C) Oral 105 18 --   08/18/19 0419 (!) 93/51 99 °F (37.2 °C) Oral 104 20 95 %   08/17/19 2355 105/65 98.1 °F (36.7 °C) Oral 104 16 99 %   08/17/19 2029 (!) 95/55 97.5 °F (36.4 °C) Oral 108 16 99 %   08/17/19 1641 (!) 98/48 97.2 °F (36.2 °C) -- 80 16 97 %   08/17/19 1203 93/65 99.1 °F (37.3 °C) Oral 99 16 96 %       Intake/Output Summary (Last 24 hours) at 8/18/2019 1135  Last data filed at 8/18/2019 0745  Gross per 24 hour   Intake 2986 ml   Output 3725 ml   Net -739 ml       Recent Labs     08/16/19  0440 08/17/19  0508 08/18/19  0443   WBC 12.2* 12.5* 13.4*   HGB 11.3* 11.2* 11.8*   HCT 35.5* 35.2* 36.2*   MCV 94.4 93.6 91.4    328 407     Recent Labs     08/16/19  0440 08/17/19  0508 08/18/19  0443    136 137   K 4.0 4.1 4.2    105 104   CO2 24 19* 23   PHOS 3.3  --   --    BUN 12 11 18   CREATININE 0.43* 0.40* 0.46*       No results for input(s): COLORU, PHUR, LABCAST, WBCUA, RBCUA, MUCUS, TRICHOMONAS, YEAST, BACTERIA, CLARITYU, SPECGRAV, LEUKOCYTESUR, UROBILINOGEN, BILIRUBINUR, BLOODU in the last 72 hours.     Invalid input(s): NITRATE, GLUCOSEUKETONESUAMORPHOUS    Additional Lab/culture results:    Physical Exam: Patient alert, sitting in bed, not in distress, discussed with the patient catheter removal, there is some activity in the colostomy at this time that points towards resolution of ileus    Interval Imaging Findings:    Impression:    Patient Active Problem List   Diagnosis    Constipation    Fatigue    Major depressive disorder, recurrent episode, severe ([address]    Urinary tract infection    Sepsis ([address]    Small bowel obstruction

## 2019-08-18 NOTE — PROGRESS NOTES
08/08/2019    POCPO2 74 08/08/2019    POCHCO3 18.8 08/08/2019    NBEA 7 08/08/2019    PBEA NOT REPORTED 08/08/2019    TRO1WSC 20 08/08/2019    SFNI3LZH 94 08/08/2019    FIO2 21.0 08/08/2019     Lab Results   Component Value Date/Time    SPECIAL NOT REPORTED 08/13/2019 05:09 PM     Lab Results   Component Value Date/Time    CULTURE NO GROWTH 08/13/2019 05:09 PM       Radiology:  Ct Abdomen Pelvis Wo Contrast Additional Contrast? None    Result Date: 8/13/2019  1. Interval sigmoidectomy and left lower quadrant colostomy formation. The majority of the small bowel is now dilated and fluid-filled, which is new. There are some decompressed small bowel loops more distally. Findings are compatible with either a postoperative ileus or distal small bowel obstruction. 2. Pelvic drain in place. No evidence of an abscess. 3. Stable large hiatal hernia, which contains the majority of the stomach, portions of the bowel, and pancreatic tail. There is associated compressive atelectasis/scarring of the left lower lung. No evidence of pneumonia. The hernia is not resulting in a bowel obstruction. 4. Nasogastric tube in place. Xr Abdomen (kub) (single Ap View)    Result Date: 8/15/2019  Less prominent gaseous distention of small bowel. Mild gaseous prominence of the proximal colon is similar in appearance. Overall these findings favor postoperative ileus. Enteric tube in place, within partially visualized large hiatal hernia. Xr Abdomen (kub) (single Ap View)    Result Date: 8/12/2019  Interval abdominal surgery. Small bowel distension compatible with postoperative ileus. Ct Chest Wo Contrast    Result Date: 8/13/2019  1. Interval sigmoidectomy and left lower quadrant colostomy formation. The majority of the small bowel is now dilated and fluid-filled, which is new. There are some decompressed small bowel loops more distally.   Findings are compatible with either a postoperative ileus or distal small bowel 8/9/2019 Yes    Hyperglycemia 8/8/2019 Yes    Chronic idiopathic constipation 8/9/2019 Yes    Septicemia ([facility] 8/9/2019 Yes    Leukemoid reaction 8/13/2019 Yes    S/P colostomy ([facility] 8/17/2019 Yes          Plan:        1. NG tube in place. 2. NPO, continue TPN. Further reccs per general surgery  3. Stable off Abx at this time. ID has signed off  4. Pain control  5. Continue current orders  6.  GI/DVT prophylaxis    Theresia Soulier, MD  8/18/2019  5:12 PM

## 2019-08-18 NOTE — PROGRESS NOTES
General Surgery Progress Note            PATIENT NAME: Michaeleen Najjar     TODAY'S DATE: 8/18/2019, 6:25 PM    SUBJECTIVE:    Pt  Seen and examined. OBJECTIVE:   VITALS:  BP (!) 94/50   Pulse 107   Temp 98.3 °F (36.8 °C) (Oral)   Resp 16   Ht 5' 5\" (1.651 m)   Wt 109 lb 8 oz (49.7 kg)   SpO2 96%   BMI 18.22 kg/m²      INTAKE/OUTPUT:      Intake/Output Summary (Last 24 hours) at 8/18/2019 1825  Last data filed at 8/18/2019 0745  Gross per 24 hour   Intake 2836 ml   Output 2200 ml   Net 636 ml                 CONSTITUTIONAL:  awake and alert. No acute distress  HEART:   Regular   LUNGS:   Clear   ABDOMEN:   Abdomen soft, non-tender, non-distended. Incision intact. +BS. + colostomy output. Drain SS  EXTREMITIES:   No edema    Data:  CBC with Differential:    Lab Results   Component Value Date    WBC 13.4 08/18/2019    RBC 3.96 08/18/2019    HGB 11.8 08/18/2019    HCT 36.2 08/18/2019     08/18/2019    MCV 91.4 08/18/2019    MCH 29.8 08/18/2019    MCHC 32.6 08/18/2019    RDW 13.1 08/18/2019    LYMPHOPCT 14 08/18/2019    MONOPCT 8 08/18/2019    BASOPCT 0 08/18/2019    MONOSABS 1.11 08/18/2019    LYMPHSABS 1.86 08/18/2019    EOSABS 0.25 08/18/2019    BASOSABS 0.06 08/18/2019    DIFFTYPE NOT REPORTED 08/18/2019     CMP:    Lab Results   Component Value Date     08/18/2019    K 4.2 08/18/2019     08/18/2019    CO2 23 08/18/2019    BUN 18 08/18/2019    CREATININE 0.46 08/18/2019    GFRAA >60 08/18/2019    LABGLOM >60 08/18/2019    GLUCOSE 133 08/18/2019    PROT 4.1 08/14/2019    LABALBU 2.0 08/14/2019    CALCIUM 8.1 08/18/2019    BILITOT 0.28 08/14/2019    ALKPHOS 47 08/14/2019    AST 19 08/14/2019    ALT 13 08/14/2019         ASSESSMENT     Principal Problem:    Generalized abdominal pain  Active Problems:     Moderate malnutrition (HCC)    Urinary tract infection    Sepsis ([facility]    Small bowel obstruction (HCC)    Hydrocephalus    Movement disorder    GERD (gastroesophageal reflux

## 2019-08-19 LAB
ABSOLUTE EOS #: 0.34 K/UL (ref 0–0.44)
ABSOLUTE IMMATURE GRANULOCYTE: 0.11 K/UL (ref 0–0.3)
ABSOLUTE LYMPH #: 2.65 K/UL (ref 1.1–3.7)
ABSOLUTE MONO #: 1.04 K/UL (ref 0.1–1.2)
ANION GAP SERPL CALCULATED.3IONS-SCNC: 7 MMOL/L (ref 9–17)
BASOPHILS # BLD: 1 % (ref 0–2)
BASOPHILS ABSOLUTE: 0.05 K/UL (ref 0–0.2)
BUN BLDV-MCNC: 18 MG/DL (ref 6–20)
BUN/CREAT BLD: 38 (ref 9–20)
CALCIUM SERPL-MCNC: 7.9 MG/DL (ref 8.6–10.4)
CHLORIDE BLD-SCNC: 107 MMOL/L (ref 98–107)
CO2: 23 MMOL/L (ref 20–31)
CREAT SERPL-MCNC: 0.48 MG/DL (ref 0.7–1.2)
CULTURE: NORMAL
DIFFERENTIAL TYPE: ABNORMAL
EOSINOPHILS RELATIVE PERCENT: 3 % (ref 1–4)
GFR AFRICAN AMERICAN: >60 ML/MIN
GFR NON-AFRICAN AMERICAN: >60 ML/MIN
GFR SERPL CREATININE-BSD FRML MDRD: ABNORMAL ML/MIN/{1.73_M2}
GFR SERPL CREATININE-BSD FRML MDRD: ABNORMAL ML/MIN/{1.73_M2}
GLUCOSE BLD-MCNC: 108 MG/DL (ref 75–110)
GLUCOSE BLD-MCNC: 112 MG/DL (ref 75–110)
GLUCOSE BLD-MCNC: 113 MG/DL (ref 70–99)
GLUCOSE BLD-MCNC: 122 MG/DL (ref 75–110)
GLUCOSE BLD-MCNC: 98 MG/DL (ref 75–110)
HCT VFR BLD CALC: 31.4 % (ref 40.7–50.3)
HEMOGLOBIN: 10.2 G/DL (ref 13–17)
IMMATURE GRANULOCYTES: 1 %
LYMPHOCYTES # BLD: 26 % (ref 24–43)
Lab: NORMAL
MAGNESIUM: 1.9 MG/DL (ref 1.6–2.6)
MCH RBC QN AUTO: 29.9 PG (ref 25.2–33.5)
MCHC RBC AUTO-ENTMCNC: 32.5 G/DL (ref 28.4–34.8)
MCV RBC AUTO: 92.1 FL (ref 82.6–102.9)
MONOCYTES # BLD: 10 % (ref 3–12)
NRBC AUTOMATED: 0 PER 100 WBC
PDW BLD-RTO: 13 % (ref 11.8–14.4)
PHOSPHORUS: 3.3 MG/DL (ref 2.5–4.5)
PLATELET # BLD: 390 K/UL (ref 138–453)
PLATELET ESTIMATE: ABNORMAL
PMV BLD AUTO: 9.4 FL (ref 8.1–13.5)
POTASSIUM SERPL-SCNC: 4.2 MMOL/L (ref 3.7–5.3)
RBC # BLD: 3.41 M/UL (ref 4.21–5.77)
RBC # BLD: ABNORMAL 10*6/UL
SEG NEUTROPHILS: 59 % (ref 36–65)
SEGMENTED NEUTROPHILS ABSOLUTE COUNT: 5.84 K/UL (ref 1.5–8.1)
SODIUM BLD-SCNC: 137 MMOL/L (ref 135–144)
SPECIMEN DESCRIPTION: NORMAL
WBC # BLD: 10 K/UL (ref 3.5–11.3)
WBC # BLD: ABNORMAL 10*3/UL

## 2019-08-19 PROCEDURE — 82947 ASSAY GLUCOSE BLOOD QUANT: CPT

## 2019-08-19 PROCEDURE — 2500000003 HC RX 250 WO HCPCS: Performed by: INTERNAL MEDICINE

## 2019-08-19 PROCEDURE — 83735 ASSAY OF MAGNESIUM: CPT

## 2019-08-19 PROCEDURE — 6370000000 HC RX 637 (ALT 250 FOR IP): Performed by: SURGERY

## 2019-08-19 PROCEDURE — 85025 COMPLETE CBC W/AUTO DIFF WBC: CPT

## 2019-08-19 PROCEDURE — 6360000002 HC RX W HCPCS: Performed by: NURSE PRACTITIONER

## 2019-08-19 PROCEDURE — 80048 BASIC METABOLIC PNL TOTAL CA: CPT

## 2019-08-19 PROCEDURE — 6360000002 HC RX W HCPCS: Performed by: SURGERY

## 2019-08-19 PROCEDURE — 2580000003 HC RX 258: Performed by: NURSE PRACTITIONER

## 2019-08-19 PROCEDURE — 2580000003 HC RX 258: Performed by: SURGERY

## 2019-08-19 PROCEDURE — 84100 ASSAY OF PHOSPHORUS: CPT

## 2019-08-19 PROCEDURE — 36415 COLL VENOUS BLD VENIPUNCTURE: CPT

## 2019-08-19 PROCEDURE — 6360000002 HC RX W HCPCS: Performed by: INTERNAL MEDICINE

## 2019-08-19 PROCEDURE — 2060000000 HC ICU INTERMEDIATE R&B

## 2019-08-19 PROCEDURE — 99232 SBSQ HOSP IP/OBS MODERATE 35: CPT | Performed by: INTERNAL MEDICINE

## 2019-08-19 PROCEDURE — C9113 INJ PANTOPRAZOLE SODIUM, VIA: HCPCS | Performed by: SURGERY

## 2019-08-19 RX ORDER — 0.9 % SODIUM CHLORIDE 0.9 %
500 INTRAVENOUS SOLUTION INTRAVENOUS ONCE
Status: COMPLETED | OUTPATIENT
Start: 2019-08-19 | End: 2019-08-19

## 2019-08-19 RX ADMIN — OXYBUTYNIN CHLORIDE 10 MG: 5 TABLET, EXTENDED RELEASE ORAL at 10:37

## 2019-08-19 RX ADMIN — METOCLOPRAMIDE 5 MG: 5 INJECTION, SOLUTION INTRAMUSCULAR; INTRAVENOUS at 22:05

## 2019-08-19 RX ADMIN — SODIUM CHLORIDE 500 ML: 9 INJECTION, SOLUTION INTRAVENOUS at 05:19

## 2019-08-19 RX ADMIN — NYSTATIN: 100000 CREAM TOPICAL at 10:39

## 2019-08-19 RX ADMIN — LORAZEPAM 1 MG: 2 INJECTION, SOLUTION INTRAMUSCULAR; INTRAVENOUS at 07:43

## 2019-08-19 RX ADMIN — ALVIMOPAN 12 MG: 12 CAPSULE ORAL at 22:05

## 2019-08-19 RX ADMIN — ANTI-FUNGAL POWDER MICONAZOLE NITRATE TALC FREE: 1.42 POWDER TOPICAL at 22:05

## 2019-08-19 RX ADMIN — METOCLOPRAMIDE 5 MG: 5 INJECTION, SOLUTION INTRAMUSCULAR; INTRAVENOUS at 10:36

## 2019-08-19 RX ADMIN — LAMOTRIGINE 25 MG: 25 TABLET ORAL at 10:37

## 2019-08-19 RX ADMIN — CALCIUM GLUCONATE: 94 INJECTION, SOLUTION INTRAVENOUS at 17:37

## 2019-08-19 RX ADMIN — LAMOTRIGINE 25 MG: 25 TABLET ORAL at 22:05

## 2019-08-19 RX ADMIN — POLYETHYLENE GLYCOL 3350 17 G: 17 POWDER, FOR SOLUTION ORAL at 10:38

## 2019-08-19 RX ADMIN — ALVIMOPAN 12 MG: 12 CAPSULE ORAL at 10:37

## 2019-08-19 RX ADMIN — METOCLOPRAMIDE 5 MG: 5 INJECTION, SOLUTION INTRAMUSCULAR; INTRAVENOUS at 03:29

## 2019-08-19 RX ADMIN — Medication 10 ML: at 10:39

## 2019-08-19 RX ADMIN — Medication 10 ML: at 10:38

## 2019-08-19 RX ADMIN — I.V. FAT EMULSION 100 ML: 20 EMULSION INTRAVENOUS at 17:46

## 2019-08-19 RX ADMIN — VILAZODONE HYDROCHLORIDE 20 MG: 20 TABLET ORAL at 10:37

## 2019-08-19 RX ADMIN — ANTI-FUNGAL POWDER MICONAZOLE NITRATE TALC FREE: 1.42 POWDER TOPICAL at 10:39

## 2019-08-19 RX ADMIN — GABAPENTIN 100 MG: 100 CAPSULE ORAL at 10:37

## 2019-08-19 RX ADMIN — Medication 10 ML: at 22:06

## 2019-08-19 RX ADMIN — METOCLOPRAMIDE 5 MG: 5 INJECTION, SOLUTION INTRAMUSCULAR; INTRAVENOUS at 16:36

## 2019-08-19 RX ADMIN — FERROUS SULFATE TAB EC 325 MG (65 MG FE EQUIVALENT) 325 MG: 325 (65 FE) TABLET DELAYED RESPONSE at 10:37

## 2019-08-19 RX ADMIN — TAMSULOSIN HYDROCHLORIDE 0.4 MG: 0.4 CAPSULE ORAL at 10:37

## 2019-08-19 RX ADMIN — PANTOPRAZOLE SODIUM 40 MG: 40 INJECTION, POWDER, FOR SOLUTION INTRAVENOUS at 10:37

## 2019-08-19 RX ADMIN — GABAPENTIN 100 MG: 100 CAPSULE ORAL at 22:05

## 2019-08-19 RX ADMIN — NYSTATIN: 100000 CREAM TOPICAL at 22:05

## 2019-08-19 RX ADMIN — LORAZEPAM 1 MG: 2 INJECTION, SOLUTION INTRAMUSCULAR; INTRAVENOUS at 22:05

## 2019-08-19 NOTE — PROGRESS NOTES
episode, severe ([address]    Urinary tract infection    Sepsis ([address]    Small bowel obstruction (HCC)    Hydrocephalus    Movement disorder    GERD (gastroesophageal reflux disease)    Cerebral palsy, athetoid (HCC)    Tobacco abuse    H/O small bowel obstruction    Fecal impaction (HCC)    Hyperglycemia    Generalized abdominal pain    Chronic idiopathic constipation    Septicemia (HCC)    Leukemoid reaction    Moderate malnutrition (HCC)    S/P colostomy ([address]       Plan:  We will remove the Martinez catheter in a.m. for trial of void    Bhaskar Vital  7:16 PM 8/19/2019

## 2019-08-19 NOTE — PROGRESS NOTES
08/08/2019    POCPO2 74 08/08/2019    POCHCO3 18.8 08/08/2019    NBEA 7 08/08/2019    PBEA NOT REPORTED 08/08/2019    DHD4QUI 20 08/08/2019    LVCM6FYR 94 08/08/2019    FIO2 21.0 08/08/2019     Lab Results   Component Value Date/Time    SPECIAL NOT REPORTED 08/13/2019 05:09 PM     Lab Results   Component Value Date/Time    CULTURE NO GROWTH 08/13/2019 05:09 PM       Radiology:  Ct Abdomen Pelvis Wo Contrast Additional Contrast? None    Result Date: 8/13/2019  1. Interval sigmoidectomy and left lower quadrant colostomy formation. The majority of the small bowel is now dilated and fluid-filled, which is new. There are some decompressed small bowel loops more distally. Findings are compatible with either a postoperative ileus or distal small bowel obstruction. 2. Pelvic drain in place. No evidence of an abscess. 3. Stable large hiatal hernia, which contains the majority of the stomach, portions of the bowel, and pancreatic tail. There is associated compressive atelectasis/scarring of the left lower lung. No evidence of pneumonia. The hernia is not resulting in a bowel obstruction. 4. Nasogastric tube in place. Xr Abdomen (kub) (single Ap View)    Result Date: 8/17/2019  1. Dilated small bowel loops in the upper abdomen now present measuring up to 5.6 cm in greatest dimension with prominence of the valvuli conniventes. Differential considerations include partial small bowel obstruction and postoperative ileus. Continued follow-up recommended. 2. Mild stool burden. 3. Left lower quadrant colostomy, surgical drain, Martinez catheter, and enteric tube as above. 4. Enteric tube distal tip projects over a large hiatal hernia. 5. Left basilar airspace opacity, atelectasis and/or infiltrate. 6. Severe levoscoliosis of the lumbar spine. The findings were sent to the Radiology Results [address] at 8:18 am on 8/17/2019to be communicated to a licensed caregiver.      Xr Abdomen (kub) (single Ap View)    Result Generalized abdominal pain 8/9/2019 Yes    Moderate malnutrition ([address] 8/15/2019 Yes    Urinary tract infection 8/8/2019 Yes    Sepsis ([address] 8/9/2019 Yes    Small bowel obstruction ([address] 8/8/2019 Yes    Hydrocephalus 8/8/2019 Yes    Movement disorder 8/8/2019 Yes    GERD (gastroesophageal reflux disease) 8/9/2019 Yes    Cerebral palsy, athetoid ([address] 8/9/2019 Yes    Tobacco abuse 8/8/2019 Yes    H/O small bowel obstruction 8/8/2019 Yes    Fecal impaction ([address] 8/9/2019 Yes    Hyperglycemia 8/8/2019 Yes    Chronic idiopathic constipation 8/9/2019 Yes    Septicemia ([address] 8/9/2019 Yes    Leukemoid reaction 8/13/2019 Yes    S/P colostomy ([address] 8/17/2019 Yes          Plan:        1. NG tube out  2. Diet advanced to clears  3. Off abx at this time per ID  4. Pain control  5. Gi/ DVT proph  6.  PT/OT    DC planning, dw Dylon Barros MD  8/19/2019  5:30 PM

## 2019-08-19 NOTE — PROGRESS NOTES
secondary to above     Recommendations:  · Watch off of antibiotics, ID following  · Continue IV fluids, monitor blood pressure   · Pain control  · Clear liquids per surgery    · Continue TPN for now once oral intakes improves we will stop TPN  · Continue Reglan and Entereg  · Continue NG tube clamped   · Surgery following  · Albuterol and Ipratropium Q 4 hours and prn  · 2 liters/min via nasal cannula if needed   · DVT prophylaxis with low molecular weight heparin  · Incentive spirometry every hour while awake  · Will follow with you      Edith Ramirez MD, [facility]  Pulmonary Critical Care and Sleep Medicine,  [facility]  Cell: [phone]  Office: [phone]

## 2019-08-20 LAB
ALBUMIN SERPL-MCNC: 2.2 G/DL (ref 3.5–5.2)
ALBUMIN/GLOBULIN RATIO: ABNORMAL (ref 1–2.5)
ALP BLD-CCNC: 87 U/L (ref 40–129)
ALT SERPL-CCNC: 36 U/L (ref 5–41)
ANION GAP SERPL CALCULATED.3IONS-SCNC: 9 MMOL/L (ref 9–17)
AST SERPL-CCNC: 29 U/L
BILIRUB SERPL-MCNC: 0.16 MG/DL (ref 0.3–1.2)
BUN BLDV-MCNC: 12 MG/DL (ref 6–20)
BUN/CREAT BLD: 29 (ref 9–20)
CALCIUM SERPL-MCNC: 8 MG/DL (ref 8.6–10.4)
CHLORIDE BLD-SCNC: 104 MMOL/L (ref 98–107)
CO2: 22 MMOL/L (ref 20–31)
CREAT SERPL-MCNC: 0.41 MG/DL (ref 0.7–1.2)
GFR AFRICAN AMERICAN: >60 ML/MIN
GFR NON-AFRICAN AMERICAN: >60 ML/MIN
GFR SERPL CREATININE-BSD FRML MDRD: ABNORMAL ML/MIN/{1.73_M2}
GFR SERPL CREATININE-BSD FRML MDRD: ABNORMAL ML/MIN/{1.73_M2}
GLUCOSE BLD-MCNC: 105 MG/DL (ref 75–110)
GLUCOSE BLD-MCNC: 114 MG/DL (ref 75–110)
GLUCOSE BLD-MCNC: 120 MG/DL (ref 75–110)
GLUCOSE BLD-MCNC: 121 MG/DL (ref 70–99)
POTASSIUM SERPL-SCNC: 3.9 MMOL/L (ref 3.7–5.3)
SODIUM BLD-SCNC: 135 MMOL/L (ref 135–144)
TOTAL PROTEIN: 4.9 G/DL (ref 6.4–8.3)

## 2019-08-20 PROCEDURE — 80053 COMPREHEN METABOLIC PANEL: CPT

## 2019-08-20 PROCEDURE — 6370000000 HC RX 637 (ALT 250 FOR IP): Performed by: SURGERY

## 2019-08-20 PROCEDURE — 6360000002 HC RX W HCPCS: Performed by: INTERNAL MEDICINE

## 2019-08-20 PROCEDURE — 2060000000 HC ICU INTERMEDIATE R&B

## 2019-08-20 PROCEDURE — 82947 ASSAY GLUCOSE BLOOD QUANT: CPT

## 2019-08-20 PROCEDURE — C9113 INJ PANTOPRAZOLE SODIUM, VIA: HCPCS | Performed by: SURGERY

## 2019-08-20 PROCEDURE — 6360000002 HC RX W HCPCS: Performed by: SURGERY

## 2019-08-20 PROCEDURE — 99232 SBSQ HOSP IP/OBS MODERATE 35: CPT | Performed by: INTERNAL MEDICINE

## 2019-08-20 PROCEDURE — 36415 COLL VENOUS BLD VENIPUNCTURE: CPT

## 2019-08-20 PROCEDURE — 6370000000 HC RX 637 (ALT 250 FOR IP): Performed by: NURSE PRACTITIONER

## 2019-08-20 PROCEDURE — 2580000003 HC RX 258: Performed by: SURGERY

## 2019-08-20 RX ADMIN — POLYETHYLENE GLYCOL 3350 17 G: 17 POWDER, FOR SOLUTION ORAL at 07:39

## 2019-08-20 RX ADMIN — NYSTATIN: 100000 CREAM TOPICAL at 22:00

## 2019-08-20 RX ADMIN — ANTI-FUNGAL POWDER MICONAZOLE NITRATE TALC FREE: 1.42 POWDER TOPICAL at 07:39

## 2019-08-20 RX ADMIN — GABAPENTIN 100 MG: 100 CAPSULE ORAL at 21:59

## 2019-08-20 RX ADMIN — MORPHINE SULFATE 2 MG: 2 INJECTION, SOLUTION INTRAMUSCULAR; INTRAVENOUS at 03:55

## 2019-08-20 RX ADMIN — Medication 10 ML: at 07:42

## 2019-08-20 RX ADMIN — MORPHINE SULFATE 2 MG: 2 INJECTION, SOLUTION INTRAMUSCULAR; INTRAVENOUS at 23:20

## 2019-08-20 RX ADMIN — ALVIMOPAN 12 MG: 12 CAPSULE ORAL at 07:37

## 2019-08-20 RX ADMIN — LAMOTRIGINE 25 MG: 25 TABLET ORAL at 07:37

## 2019-08-20 RX ADMIN — INSULIN LISPRO 1 UNITS: 100 INJECTION, SOLUTION INTRAVENOUS; SUBCUTANEOUS at 18:51

## 2019-08-20 RX ADMIN — ANTI-FUNGAL POWDER MICONAZOLE NITRATE TALC FREE: 1.42 POWDER TOPICAL at 22:00

## 2019-08-20 RX ADMIN — FERROUS SULFATE TAB EC 325 MG (65 MG FE EQUIVALENT) 325 MG: 325 (65 FE) TABLET DELAYED RESPONSE at 07:36

## 2019-08-20 RX ADMIN — PANTOPRAZOLE SODIUM 40 MG: 40 INJECTION, POWDER, FOR SOLUTION INTRAVENOUS at 07:39

## 2019-08-20 RX ADMIN — Medication 10 ML: at 08:42

## 2019-08-20 RX ADMIN — METOCLOPRAMIDE 5 MG: 5 INJECTION, SOLUTION INTRAMUSCULAR; INTRAVENOUS at 03:55

## 2019-08-20 RX ADMIN — MORPHINE SULFATE 2 MG: 2 INJECTION, SOLUTION INTRAMUSCULAR; INTRAVENOUS at 14:58

## 2019-08-20 RX ADMIN — METOCLOPRAMIDE 5 MG: 5 INJECTION, SOLUTION INTRAMUSCULAR; INTRAVENOUS at 21:59

## 2019-08-20 RX ADMIN — Medication 10 ML: at 21:59

## 2019-08-20 RX ADMIN — VILAZODONE HYDROCHLORIDE 20 MG: 20 TABLET ORAL at 07:37

## 2019-08-20 RX ADMIN — GABAPENTIN 100 MG: 100 CAPSULE ORAL at 07:36

## 2019-08-20 RX ADMIN — METOCLOPRAMIDE 5 MG: 5 INJECTION, SOLUTION INTRAMUSCULAR; INTRAVENOUS at 07:38

## 2019-08-20 RX ADMIN — LAMOTRIGINE 25 MG: 25 TABLET ORAL at 21:58

## 2019-08-20 RX ADMIN — METOCLOPRAMIDE 5 MG: 5 INJECTION, SOLUTION INTRAMUSCULAR; INTRAVENOUS at 14:58

## 2019-08-20 RX ADMIN — TAMSULOSIN HYDROCHLORIDE 0.4 MG: 0.4 CAPSULE ORAL at 07:37

## 2019-08-20 RX ADMIN — NYSTATIN: 100000 CREAM TOPICAL at 07:39

## 2019-08-20 RX ADMIN — ONDANSETRON 4 MG: 2 INJECTION INTRAMUSCULAR; INTRAVENOUS at 22:09

## 2019-08-20 RX ADMIN — MORPHINE SULFATE 2 MG: 2 INJECTION, SOLUTION INTRAMUSCULAR; INTRAVENOUS at 08:39

## 2019-08-20 RX ADMIN — ALVIMOPAN 12 MG: 12 CAPSULE ORAL at 21:59

## 2019-08-20 RX ADMIN — OXYBUTYNIN CHLORIDE 10 MG: 5 TABLET, EXTENDED RELEASE ORAL at 07:36

## 2019-08-20 ASSESSMENT — PAIN DESCRIPTION - PROGRESSION
CLINICAL_PROGRESSION: NOT CHANGED
CLINICAL_PROGRESSION: NOT CHANGED

## 2019-08-20 ASSESSMENT — PAIN SCALES - GENERAL
PAINLEVEL_OUTOF10: 8
PAINLEVEL_OUTOF10: 7
PAINLEVEL_OUTOF10: 6

## 2019-08-20 ASSESSMENT — PAIN DESCRIPTION - DESCRIPTORS
DESCRIPTORS: ACHING
DESCRIPTORS: ACHING

## 2019-08-20 ASSESSMENT — PAIN DESCRIPTION - PAIN TYPE
TYPE: SURGICAL PAIN
TYPE: SURGICAL PAIN

## 2019-08-20 ASSESSMENT — PAIN DESCRIPTION - FREQUENCY
FREQUENCY: CONTINUOUS
FREQUENCY: CONTINUOUS

## 2019-08-20 ASSESSMENT — PAIN DESCRIPTION - ONSET
ONSET: ON-GOING
ONSET: ON-GOING

## 2019-08-20 ASSESSMENT — PAIN DESCRIPTION - LOCATION
LOCATION: ABDOMEN
LOCATION: ABDOMEN

## 2019-08-20 ASSESSMENT — PAIN DESCRIPTION - ORIENTATION
ORIENTATION: MID
ORIENTATION: MID

## 2019-08-20 NOTE — PROGRESS NOTES
stool  Extremities:  no edema, redness, tenderness in the calves  Skin:  no gross lesions, rashes, induration    Assessment:        Hospital Problems           Last Modified POA    * (Principal) Generalized abdominal pain 8/9/2019 Yes    Moderate malnutrition ([address] 8/15/2019 Yes    Urinary tract infection 8/8/2019 Yes    Sepsis ([address] 8/9/2019 Yes    Small bowel obstruction ([address] 8/8/2019 Yes    Hydrocephalus 8/8/2019 Yes    Movement disorder 8/8/2019 Yes    GERD (gastroesophageal reflux disease) 8/9/2019 Yes    Cerebral palsy, athetoid ([address] 8/9/2019 Yes    Tobacco abuse 8/8/2019 Yes    H/O small bowel obstruction 8/8/2019 Yes    Fecal impaction ([address] 8/9/2019 Yes    Hyperglycemia 8/8/2019 Yes    Chronic idiopathic constipation 8/9/2019 Yes    Septicemia ([address] 8/9/2019 Yes    Leukemoid reaction 8/13/2019 Yes    S/P colostomy ([address] 8/17/2019 Yes          Plan:          1. Diet advanced to regular  2. Off abx at this time per ID  3. TPN being weaned  4. Pain control  5. Gi/ DVT proph  6.  PT/OT    [facility] nurses    Kamla Bal MD  8/20/2019  12:48 PM

## 2019-08-20 NOTE — PROGRESS NOTES
palsy, athetoid (HCC)    Tobacco abuse    H/O small bowel obstruction    Fecal impaction (HCC)    Hyperglycemia    Generalized abdominal pain    Chronic idiopathic constipation    Septicemia (HCC)    Leukemoid reaction    Moderate malnutrition (HCC)    S/P colostomy ([facility]       Plan: Catheter removal today, monitor postvoid residual    Francisco Javier Aguilar  7:41 AM 8/20/2019

## 2019-08-21 VITALS
DIASTOLIC BLOOD PRESSURE: 61 MMHG | OXYGEN SATURATION: 97 % | TEMPERATURE: 99.1 F | HEART RATE: 104 BPM | BODY MASS INDEX: 17.91 KG/M2 | HEIGHT: 65 IN | WEIGHT: 107.5 LBS | RESPIRATION RATE: 16 BRPM | SYSTOLIC BLOOD PRESSURE: 98 MMHG

## 2019-08-21 LAB
ANION GAP SERPL CALCULATED.3IONS-SCNC: 10 MMOL/L (ref 9–17)
BUN BLDV-MCNC: 16 MG/DL (ref 6–20)
BUN/CREAT BLD: 35 (ref 9–20)
CALCIUM SERPL-MCNC: 8.4 MG/DL (ref 8.6–10.4)
CHLORIDE BLD-SCNC: 104 MMOL/L (ref 98–107)
CO2: 23 MMOL/L (ref 20–31)
CREAT SERPL-MCNC: 0.46 MG/DL (ref 0.7–1.2)
GFR AFRICAN AMERICAN: >60 ML/MIN
GFR NON-AFRICAN AMERICAN: >60 ML/MIN
GFR SERPL CREATININE-BSD FRML MDRD: ABNORMAL ML/MIN/{1.73_M2}
GFR SERPL CREATININE-BSD FRML MDRD: ABNORMAL ML/MIN/{1.73_M2}
GLUCOSE BLD-MCNC: 101 MG/DL (ref 75–110)
GLUCOSE BLD-MCNC: 105 MG/DL (ref 70–99)
GLUCOSE BLD-MCNC: 107 MG/DL (ref 75–110)
GLUCOSE BLD-MCNC: 111 MG/DL (ref 75–110)
MAGNESIUM: 1.6 MG/DL (ref 1.6–2.6)
PHOSPHORUS: 3.7 MG/DL (ref 2.5–4.5)
POTASSIUM SERPL-SCNC: 4 MMOL/L (ref 3.7–5.3)
SODIUM BLD-SCNC: 137 MMOL/L (ref 135–144)

## 2019-08-21 PROCEDURE — 82947 ASSAY GLUCOSE BLOOD QUANT: CPT

## 2019-08-21 PROCEDURE — 99239 HOSP IP/OBS DSCHRG MGMT >30: CPT | Performed by: INTERNAL MEDICINE

## 2019-08-21 PROCEDURE — 84100 ASSAY OF PHOSPHORUS: CPT

## 2019-08-21 PROCEDURE — 2580000003 HC RX 258: Performed by: SURGERY

## 2019-08-21 PROCEDURE — 83735 ASSAY OF MAGNESIUM: CPT

## 2019-08-21 PROCEDURE — 80048 BASIC METABOLIC PNL TOTAL CA: CPT

## 2019-08-21 PROCEDURE — 6370000000 HC RX 637 (ALT 250 FOR IP): Performed by: SURGERY

## 2019-08-21 PROCEDURE — 6360000002 HC RX W HCPCS: Performed by: INTERNAL MEDICINE

## 2019-08-21 PROCEDURE — 36415 COLL VENOUS BLD VENIPUNCTURE: CPT

## 2019-08-21 PROCEDURE — 6360000002 HC RX W HCPCS: Performed by: SURGERY

## 2019-08-21 PROCEDURE — C9113 INJ PANTOPRAZOLE SODIUM, VIA: HCPCS | Performed by: SURGERY

## 2019-08-21 RX ORDER — METOCLOPRAMIDE 5 MG/1
5 TABLET ORAL 4 TIMES DAILY
Qty: 120 TABLET | Refills: 1 | DISCHARGE
Start: 2019-08-21

## 2019-08-21 RX ORDER — NYSTATIN 100000 U/G
CREAM TOPICAL
DISCHARGE
Start: 2019-08-21

## 2019-08-21 RX ORDER — HYDROCODONE BITARTRATE AND ACETAMINOPHEN 5; 325 MG/1; MG/1
1 TABLET ORAL EVERY 6 HOURS PRN
Qty: 12 TABLET | Refills: 0 | Status: SHIPPED | OUTPATIENT
Start: 2019-08-21 | End: 2019-08-24

## 2019-08-21 RX ADMIN — FERROUS SULFATE TAB EC 325 MG (65 MG FE EQUIVALENT) 325 MG: 325 (65 FE) TABLET DELAYED RESPONSE at 08:31

## 2019-08-21 RX ADMIN — METOCLOPRAMIDE 5 MG: 5 INJECTION, SOLUTION INTRAMUSCULAR; INTRAVENOUS at 08:31

## 2019-08-21 RX ADMIN — Medication 10 ML: at 08:32

## 2019-08-21 RX ADMIN — ANTI-FUNGAL POWDER MICONAZOLE NITRATE TALC FREE: 1.42 POWDER TOPICAL at 08:32

## 2019-08-21 RX ADMIN — SODIUM CHLORIDE, POTASSIUM CHLORIDE, SODIUM LACTATE AND CALCIUM CHLORIDE: 600; 310; 30; 20 INJECTION, SOLUTION INTRAVENOUS at 01:25

## 2019-08-21 RX ADMIN — NYSTATIN: 100000 CREAM TOPICAL at 08:32

## 2019-08-21 RX ADMIN — TAMSULOSIN HYDROCHLORIDE 0.4 MG: 0.4 CAPSULE ORAL at 08:31

## 2019-08-21 RX ADMIN — METOCLOPRAMIDE 5 MG: 5 INJECTION, SOLUTION INTRAMUSCULAR; INTRAVENOUS at 04:30

## 2019-08-21 RX ADMIN — LAMOTRIGINE 25 MG: 25 TABLET ORAL at 08:31

## 2019-08-21 RX ADMIN — VILAZODONE HYDROCHLORIDE 20 MG: 20 TABLET ORAL at 08:31

## 2019-08-21 RX ADMIN — GABAPENTIN 100 MG: 100 CAPSULE ORAL at 08:31

## 2019-08-21 RX ADMIN — ALVIMOPAN 12 MG: 12 CAPSULE ORAL at 08:31

## 2019-08-21 RX ADMIN — OXYBUTYNIN CHLORIDE 10 MG: 5 TABLET, EXTENDED RELEASE ORAL at 08:31

## 2019-08-21 RX ADMIN — PANTOPRAZOLE SODIUM 40 MG: 40 INJECTION, POWDER, FOR SOLUTION INTRAVENOUS at 08:32

## 2019-08-21 NOTE — DISCHARGE INSTR - COC
Concerns: At Risk for Falls    Impairments/Disabilities:      Contractures - history of cerabral palsy    Nutrition Therapy:  Current Nutrition Therapy:   - Oral Diet:  Dental Soft    Routes of Feeding: Oral  Liquids: No Restrictions  Daily Fluid Restriction: no  Last Modified Barium Swallow with Video (Video Swallowing Test): not done    Treatments at the Time of Hospital Discharge:   Respiratory Treatments: see mar   Oxygen Therapy:  is not on home oxygen therapy. Ventilator:    - No ventilator support    Rehab Therapies:   Weight Bearing Status/Restrictions: No weight bearing restirctions  Other Medical Equipment (for information only, NOT a DME order):  wheelchair, bath bench, bedside commode and hospital bed  Other Treatments:   1. Skilled RN assessments   2. 2x2 dry dressing to RUQ from BHARATH drain until healed     Patient's personal belongings (please select all that are sent with patient):  None    RN SIGNATURE:  Electronically signed by Daisy Haider RN on 8/21/19 at 11:53 AM    CASE MANAGEMENT/SOCIAL WORK SECTION    Inpatient Status Date: 8/8    Readmission Risk Assessment Score:  Readmission Risk              Risk of Unplanned Readmission:        14           Discharging to Facility/ Agency   · Name: [facility]   · Address: [place] [address]  · Phone: [phone]  · Fax: [phone]    Dialysis Facility (if applicable)   · Name:  · Address:  · Dialysis Schedule:  · Phone:  · Fax:    / signature: Electronically signed by Carlos Hernandez RN on 8/21/19 at 11:56 AM    PHYSICIAN SECTION    Prognosis: Fair    Condition at Discharge: Stable    Rehab Potential (if transferring to Rehab):  Fair    Recommended Labs or Other Treatments After Discharge: Stoma care    Physician Certification: I certify the above information and transfer of Dom Okeefe  is necessary for the continuing treatment of the diagnosis listed and that he requires Skilled

## 2019-08-21 NOTE — CARE COORDINATION
[facility] called the unit to verify transport time, Tyron Pimentel RN reports transport is here now about to transport pt.   MINAL called the facility to inform of transport time 1:25pm. MINAL will fax discharge paperwork

## 2019-08-21 NOTE — PROGRESS NOTES
The patient was taken to surgery on 8/9/2019 for exploratory laparotomy, left colectomy with colostomy and appendectomy.        Review of Systems:     Constitutional:  negative for chills, fevers, sweats  Respiratory:  negative for cough, dyspnea on exertion, shortness of breath, wheezing  Cardiovascular:  negative for chest pain, chest pressure/discomfort, lower extremity edema, palpitations  Gastrointestinal:  negative for abdominal pain, constipation, diarrhea, nausea, vomiting  Neurological:  negative for dizziness, headache    Medications: Allergies:     Allergies   Allergen Reactions    Pollen Extract        Current Meds:   Scheduled Meds:    insulin lispro  0-6 Units Subcutaneous 4x Daily AC & HS    alvimopan  12 mg Oral BID    metoclopramide  5 mg Intravenous Q6H    pantoprazole  40 mg Intravenous Daily    And    sodium chloride (PF)  10 mL Intravenous Daily    polyethylene glycol  17 g Oral Daily    fentaNYL  1 patch Transdermal Q72H    ferrous sulfate  325 mg Oral Daily    gabapentin  100 mg Oral BID    lamoTRIgine  25 mg Oral BID    oxybutynin  10 mg Oral Daily    tamsulosin  0.4 mg Oral Daily    vilazodone HCl  20 mg Oral Daily    vitamin D  50,000 Units Oral Weekly    sodium chloride flush  10 mL Intravenous 2 times per day    nystatin   Topical BID    miconazole   Topical BID     Continuous Infusions:    lactated ringers 50 mL/hr at 08/21/19 0125    dextrose       PRN Meds: LORazepam, LORazepam, potassium chloride, promethazine, potassium chloride, sodium chloride flush, ondansetron, benztropine mesylate, nicotine, acetaminophen, glucose, dextrose, glucagon (rDNA), dextrose, morphine **OR** morphine    Data:     Past Medical History:   has a past medical history of Cerebral palsy ([facility] [address] [facility] Cerebral palsy, athetoid ([facility] [address] Constipation, GERD (gastroesophageal reflux disease), Headache, Hydrocephalus, Movement disorder, Psychiatric problem, Small bowel obstruction due to adhesions POCPO2 74 08/08/2019    POCHCO3 18.8 08/08/2019    NBEA 7 08/08/2019    PBEA NOT REPORTED 08/08/2019    ZAG7QHR 20 08/08/2019    PCWU7SLA 94 08/08/2019    FIO2 21.0 08/08/2019     Lab Results   Component Value Date/Time    SPECIAL NOT REPORTED 08/13/2019 05:09 PM     Lab Results   Component Value Date/Time    CULTURE NO GROWTH 08/13/2019 05:09 PM       Radiology:  Ct Abdomen Pelvis Wo Contrast Additional Contrast? None    Result Date: 8/13/2019  1. Interval sigmoidectomy and left lower quadrant colostomy formation. The majority of the small bowel is now dilated and fluid-filled, which is new. There are some decompressed small bowel loops more distally. Findings are compatible with either a postoperative ileus or distal small bowel obstruction. 2. Pelvic drain in place. No evidence of an abscess. 3. Stable large hiatal hernia, which contains the majority of the stomach, portions of the bowel, and pancreatic tail. There is associated compressive atelectasis/scarring of the left lower lung. No evidence of pneumonia. The hernia is not resulting in a bowel obstruction. 4. Nasogastric tube in place. Xr Abdomen (kub) (single Ap View)    Result Date: 8/17/2019  1. Dilated small bowel loops in the upper abdomen now present measuring up to 5.6 cm in greatest dimension with prominence of the valvuli conniventes. Differential considerations include partial small bowel obstruction and postoperative ileus. Continued follow-up recommended. 2. Mild stool burden. 3. Left lower quadrant colostomy, surgical drain, Martinez catheter, and enteric tube as above. 4. Enteric tube distal tip projects over a large hiatal hernia. 5. Left basilar airspace opacity, atelectasis and/or infiltrate. 6. Severe levoscoliosis of the lumbar spine. The findings were sent to the Radiology Results [address] at 8:18 am on 8/17/2019to be communicated to a licensed caregiver.      Xr Abdomen (kub) (single Ap View)    Result Date:

## 2019-08-21 NOTE — CARE COORDINATION
Discharge planning    Spoke with DR Camille Dandy and anticipate TPN to be off on wed and possible dc to snf

## 2019-09-07 PROBLEM — N39.0 URINARY TRACT INFECTION: Status: RESOLVED | Noted: 2019-08-08 | Resolved: 2019-09-07

## 2019-10-17 ENCOUNTER — HOSPITAL ENCOUNTER (OUTPATIENT)
Age: 44
Setting detail: SPECIMEN
Discharge: HOME | End: 2019-10-17
Payer: MEDICARE

## 2019-10-17 LAB
ANION GAP SERPL CALCULATED.3IONS-SCNC: 14 MMOL/L (ref 9–17)
BUN BLDV-MCNC: 13 MG/DL (ref 6–20)
BUN/CREAT BLD: 18 (ref 9–20)
CALCIUM SERPL-MCNC: 9.2 MG/DL (ref 8.6–10.4)
CHLORIDE BLD-SCNC: 107 MMOL/L (ref 98–107)
CO2: 19 MMOL/L (ref 20–31)
CREAT SERPL-MCNC: 0.74 MG/DL (ref 0.7–1.2)
GFR AFRICAN AMERICAN: >60 ML/MIN
GFR NON-AFRICAN AMERICAN: >60 ML/MIN
GFR SERPL CREATININE-BSD FRML MDRD: ABNORMAL ML/MIN/{1.73_M2}
GFR SERPL CREATININE-BSD FRML MDRD: ABNORMAL ML/MIN/{1.73_M2}
GLUCOSE BLD-MCNC: 84 MG/DL (ref 70–99)
HCT VFR BLD CALC: 43.7 % (ref 40.7–50.3)
HEMOGLOBIN: 13.4 G/DL (ref 13–17)
MAGNESIUM: 1.9 MG/DL (ref 1.6–2.6)
MCH RBC QN AUTO: 28.4 PG (ref 25.2–33.5)
MCHC RBC AUTO-ENTMCNC: 30.7 G/DL (ref 28–38)
MCV RBC AUTO: 92.6 FL (ref 82.6–102.9)
NRBC AUTOMATED: NORMAL PER 100 WBC
PDW BLD-RTO: 12.3 % (ref 11.8–14.4)
PLATELET # BLD: 255 K/UL (ref 138–453)
PMV BLD AUTO: 10 FL (ref 8.1–13.5)
POTASSIUM SERPL-SCNC: 4.4 MMOL/L (ref 3.7–5.3)
RBC # BLD: 4.72 M/UL (ref 4.21–5.77)
SODIUM BLD-SCNC: 140 MMOL/L (ref 135–144)
WBC # BLD: 7.2 K/UL (ref 3.5–11.3)

## 2019-10-17 PROCEDURE — 80048 BASIC METABOLIC PNL TOTAL CA: CPT

## 2019-10-17 PROCEDURE — P9603 ONE-WAY ALLOW PRORATED MILES: HCPCS

## 2019-10-17 PROCEDURE — 83735 ASSAY OF MAGNESIUM: CPT

## 2019-10-17 PROCEDURE — 85027 COMPLETE CBC AUTOMATED: CPT

## 2019-10-17 PROCEDURE — 36415 COLL VENOUS BLD VENIPUNCTURE: CPT

## (undated) DEVICE — GLOVE SURG SZ 75 L12IN FNGR THK87MIL WHT LTX FREE

## (undated) DEVICE — CLIPVAC PRESURG HAIR REMOVAL

## (undated) DEVICE — DECANTER FLD 9IN ST BG FOR ASEP TRNSF OF FLD

## (undated) DEVICE — Device

## (undated) DEVICE — PAD,ABDOMINAL,5"X9",ST,LF,25/BX: Brand: MEDLINE INDUSTRIES, INC.

## (undated) DEVICE — BARRIER, ABSORBABLE, ADHESION: Brand: SEPRAFILM®

## (undated) DEVICE — SPONGE LAP W18XL18IN WHT COT 4 PLY FLD STRUNG RADPQ DISP ST

## (undated) DEVICE — TUBING, SUCTION, 1/4" X 12', STRAIGHT: Brand: MEDLINE

## (undated) DEVICE — GAUZE,SPONGE,4"X4",16PLY,XRAY,STRL,LF: Brand: MEDLINE

## (undated) DEVICE — CHLORAPREP 26ML ORANGE

## (undated) DEVICE — GOWN,AURORA,NONRNF,XL,30/CS: Brand: MEDLINE

## (undated) DEVICE — DRAPE,UNDERBUTTOCKS,PCH,STERILE: Brand: MEDLINE

## (undated) DEVICE — 3M™ IOBAN™ 2 ANTIMICROBIAL INCISE DRAPE 6650EZ: Brand: IOBAN™ 2

## (undated) DEVICE — TOWEL,OR,DSP,ST,BLUE,DLX,XR,4/PK,20PK/CS: Brand: MEDLINE

## (undated) DEVICE — RESERVOIR,SUCTION,100CC,SILICONE: Brand: MEDLINE

## (undated) DEVICE — OSTOMY POUCH CLAMP: Brand: HOLLISTER

## (undated) DEVICE — STAPLER INT L75MM CUT LN L73MM STPL LN L77MM BLU B FRM 8

## (undated) DEVICE — Z DUPLICATE USE 2716240 STAPLER INT CUT LN L40MM STPL L51MM GRN CRV HD B FRM

## (undated) DEVICE — AGENT HEMSTAT W2XL14IN OXIDIZED REGENERATED CELOS ABSRB FOR

## (undated) DEVICE — GAUZE,SPONGE,FLUFF,6"X6.75",STRL,5/TRAY: Brand: MEDLINE

## (undated) DEVICE — DRAIN SURG W10XL20CM SIL SMOOTH FLAT 3/4 PERF DBL WRP

## (undated) DEVICE — DRAPE IRRIG FLD WRM W44XL44IN W/ AORN STD PRTBL INTRATEMP

## (undated) DEVICE — COVER LT HNDL BLU PLAS

## (undated) DEVICE — 3M™ WARMING BLANKET, UPPER BODY, 10 PER CASE, 42268: Brand: BAIR HUGGER™

## (undated) DEVICE — SPONGE DRN W4XL4IN RAYON/POLYESTER 6 PLY NONWOVEN PRECUT

## (undated) DEVICE — E-Z CLEAN, NON-STICK, PTFE COATED, ELECTROSURGICAL BLADE ELECTRODE, 6.5 INCH (16.5 CM): Brand: MEGADYNE

## (undated) DEVICE — GLOVE SURG SZ 8 L12IN FNGR THK87MIL WHT LTX FREE

## (undated) DEVICE — LEGGINGS, PAIR, 33X51 XL, STERILE: Brand: MEDLINE

## (undated) DEVICE — SEALER ENDOSCP NANO COAT OPN DIV CRV L JAW LIGASURE IMPACT

## (undated) DEVICE — BLADE CLIPPER GEN PURP NS

## (undated) DEVICE — PACK PROCEDURE SURG FACILITY SPEC GI BWL SPT SVMMC

## (undated) DEVICE — GARMENT,MEDLINE,DVT,INT,CALF,MED, GEN2: Brand: MEDLINE

## (undated) DEVICE — RELOAD STPL L75MM OPN H3.8MM CLS 1.5MM WIRE DIA0.2MM REG

## (undated) DEVICE — Z DISCONTINUED USE 2716238 PER MEDLINE STAPLER INT L40MM DIA4.7MM GRN CRV HD B FRM TECHNOLOGY DISP

## (undated) DEVICE — POUCH OST L12IN STOMA DIA19-64MM TRNSPAR PRECUT OPN 1 SIDE

## (undated) DEVICE — SURGICAL PROCEDURE KIT BASIN MAJ SET UP

## (undated) DEVICE — GOWN,SIRUS,NON REINFRCD,LARGE,SET IN SL: Brand: MEDLINE

## (undated) DEVICE — YANKAUER,POOLE TIP,STERILE,50/CS: Brand: MEDLINE

## (undated) DEVICE — DRAPE,REIN 53X77,STERILE: Brand: MEDLINE